# Patient Record
Sex: FEMALE | Race: BLACK OR AFRICAN AMERICAN | Employment: OTHER | RURAL
[De-identification: names, ages, dates, MRNs, and addresses within clinical notes are randomized per-mention and may not be internally consistent; named-entity substitution may affect disease eponyms.]

---

## 2017-02-14 ENCOUNTER — OFFICE VISIT (OUTPATIENT)
Dept: ONCOLOGY | Age: 67
End: 2017-02-14

## 2017-02-14 VITALS
BODY MASS INDEX: 31.57 KG/M2 | TEMPERATURE: 98 F | DIASTOLIC BLOOD PRESSURE: 81 MMHG | HEART RATE: 68 BPM | HEIGHT: 61 IN | WEIGHT: 167.2 LBS | RESPIRATION RATE: 16 BRPM | SYSTOLIC BLOOD PRESSURE: 139 MMHG

## 2017-02-14 DIAGNOSIS — Z79.811 USE OF EXEMESTANE (AROMASIN): ICD-10-CM

## 2017-02-14 DIAGNOSIS — C50.912 MALIGNANT NEOPLASM OF LEFT FEMALE BREAST, UNSPECIFIED SITE OF BREAST: Primary | ICD-10-CM

## 2017-02-14 NOTE — PROGRESS NOTES
Sheba Paez here for follow up for left breast cancer. States she is fine. Ms. Miryam Grossman has a reminder for a \"due or due soon\" health maintenance. I have asked that she contact her primary care provider for follow-up on this health maintenance.

## 2017-02-15 NOTE — PROGRESS NOTES
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     History of Present Illness: In September 2010, the patient underwent a left modified radical mastectomy and sentinel lymph node biopsy with a right prophylactic mastectomy. Tumor proved to be an approximate 5.0 cm poorly differentiated infiltrating ductal carcinoma. Five sentinel lymph nodes were negative for metastasis. The estrogen and progesterone receptors were positive and HER2/susan was negative. Oncotype Dx testing revealed a recurrence score of 32 with an average rate of distal recurrence of 21% which placed the patient into the high risk category. The patient completed 4 cycles of Taxotere and Cytoxan chemotherapy in June 2011 and radiotherapy to the left chest wall given between December 2010 through January 2011. She was begun on Arimidex since July 2011 and because of a bone density study showing osteoporosis, was placed on Fosamax which the patient could not tolerate. The Arimidex was discontinued on July 2012 and tamoxifen 20 mg daily was begun at that time and the patient has tolerated it well.     Of note, is that the patient underwent a hemicolectomy in 1998 for carcinoma of the colon. Lymph nodes were negative. She was started on chemotherapy but this was stopped secondary to side effects and has had no recurrence.     The patient states that she has undergone a D&C and hysterotomy. No OP note is available. Pathology report shows an endocervical polyp and proliferative endometrium.     I discontinued tamoxifen in January 2015. At that time I began Aromasin 25 mg daily and the patient continues on that and is tolerating it well. She does have some minimal muscle aching. She has also been taking Prolia for the osteoporosis and has tolerated that well.     The patient underwent a colonoscopy for rectal bleeding on 12/11/15.  The only thing that was found was internal hemorrhoids. She also has complained of a rash on the left side of her face and neck. The Aromasin was begun in February of 2015. She was also put on Prolia at that time. The rash appeared in October of 2015. She still complains about it, but feels it has changed color to a darker form. It is mildly pruritic. The patient also states that Dr. Valentino Goodrich who did the endoscopy wanted to do some type of ultrasound on the upper abdomen, but this has not been scheduled yet. A CT of the abdomen and pelvis done 9/2/15 when the patient was admitted with a small bowel obstruction showed only the SBO, nonspecific gastric distention. No acute findings were noted.     At the last visit the patient had a rash on the left side of her neck and the Aromasin was held. She is now back on the Aromasin.     She has been taking Aromasin since 2/5/15. She is also on Prolia. She is due for Prolia 60 mg a day and then in six months. Her next visit here is on 6/15/17.     Past Medical History: Medical, endometriosis, essential hypertension, osteoporosis, elevated lipids, hematuria, incontinence, excision of neurofibromas.     Past Surgical History: Bilateral mastectomies, left oophorectomy, colon resection, left foot surgery, hysteroscopy and dental surgery.     Medications: Lipitor, Norvasc, vitamins, lansoprazide, Zantac, Fosamax, and Aromasin.     Allergies: None.     Transfusions: Several in the past.     Personal/Social History: Patient is retired. 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 She has 3 children. Used to smoke cigarettes. Does not use alcohol.     Family Medical History: Father had history of colon cancer. Mother had essential hypertension. Brother had a stroke and a CVA.     Review of Systems: Weight has been stable, no fever or night sweats, denies headache, seizures, eye or ear changes, chronic sputum production, hemoptysis, chest pain, PND, angina.  No nausea, vomiting, diarrhea, melena, hematochezia, constipation, change in bowel habits, hematemesis, hematuria, nocturia, dysuria, urinary frequency, urinary hesitance, musculoskeletal aching, temperature preferences, polyuria, polydipsia or easy bruisability. No bleeding, no allergies, no psychiatric issues, no gynecological problems.     Physical Examination:   GENERAL: Patient is a well developed, well nourished black female in no acute distress. VITALS: Weight 167 pounds, temperature 98 degrees, /81, pulse 68 and regular, respiratory rate 16 and regular. HEENT: Head was normocephalic, atraumatic. Pupils equal, round, reactive to light and accommodation. Extraocular muscles were intact. Fundi were not visualized. Conjunctivae normal. Sclerae were nonicteric. Ears, nose and throat were normal. Tongue is papillated. NECK: Supple, thyroid normal, no bruits heard over the carotid arteries. No jugular venous distention demonstrated. Trachea was midline. NODES: All node bearing areas are negative. No cervical adenopathy and no axillary adenopathy noted. BACK: No tenderness over the thoracic or cervical spine. CHEST: Clear. I hear no rales, rhonchi, or wheezes today. HEART: Regular rhythm. No murmurs, rubs, thrills, or gallops. BREASTS: Bilateral mastectomy scars are present. No evidence of nodularity or recurrent disease. ABDOMEN: Soft. No hepatosplenomegaly, masses or tenderness. Bowel sounds present. SKIN: Warm and dry. No petechiae, no purpura. EXTREMITIES: Show no edema. PELVIC: Deferred. RECTAL: Deferred. NEURO: Intact. Cranial nerves are intact. Plantars are downgoing. Motor and sensory function normal.      Laboratory Data: Check CBC, CMP, CA 27-29.     Impression:  1. Stage IIB poorly differentiated infiltrating ductal carcinoma of the left breast.  2. Status post left modified radical mastectomy and right simple mastectomy done prophylactically.   3. Status post treatment with 4 cycles of Taxotere and Cytoxan chemotherapy and chest wall radiation. 4. Status post hemicolectomy for colon carcinoma. 5. Status post left oophorectomy. 6. Treatment with Arimidex July 2011 to July 2012. Tamoxifen begun July 2012 and stopped by me on 01/21/2015. 7. Aromasin began 02/05/2015 aiming for 10 years per new ASCO guidelines from 6/5/16     Plan:  1. Continue Aromasin 25 mg daily, start date is 2/16, aiming for 10 years. 2. Prolia will be given today. 3. Four month return. 4. She had a CT scan of the chest done on 12/12/16, which showed stable nodularity.   A follow up exam in one year was suggested, which will be on December, 2017.  5. Next bone density will be in August of 2017.     CC:     Sunny Patel MD

## 2017-02-21 ENCOUNTER — TELEPHONE (OUTPATIENT)
Dept: ONCOLOGY | Age: 67
End: 2017-02-21

## 2017-02-21 RX ORDER — EXEMESTANE 25 MG/1
TABLET ORAL
Qty: 30 TAB | Refills: 6 | Status: SHIPPED | OUTPATIENT
Start: 2017-02-21 | End: 2018-01-15 | Stop reason: SDUPTHER

## 2017-02-28 ENCOUNTER — TELEPHONE (OUTPATIENT)
Dept: ONCOLOGY | Age: 67
End: 2017-02-28

## 2017-02-28 NOTE — TELEPHONE ENCOUNTER
WENT TO DRUG STORE TO  AROMASIN RX AND IT WAS $96.  COULD NOT AFFORD IT.  (SHE IS HOPING FOR OTHER OPTIONS)

## 2017-02-28 NOTE — TELEPHONE ENCOUNTER
Talked to patient and told her I do not know how much any medication would be. She states she will call her insurance company and see if it will be this much every month or if she has to reach a deductable. She will call back to let me know what she finds out.

## 2017-06-12 ENCOUNTER — OFFICE VISIT (OUTPATIENT)
Dept: CARDIOLOGY CLINIC | Age: 67
End: 2017-06-12

## 2017-06-12 VITALS
BODY MASS INDEX: 31.64 KG/M2 | DIASTOLIC BLOOD PRESSURE: 80 MMHG | SYSTOLIC BLOOD PRESSURE: 140 MMHG | HEART RATE: 72 BPM | OXYGEN SATURATION: 98 % | WEIGHT: 167.6 LBS | RESPIRATION RATE: 16 BRPM | HEIGHT: 61 IN

## 2017-06-12 DIAGNOSIS — Z90.13 S/P BILATERAL MASTECTOMY: ICD-10-CM

## 2017-06-12 DIAGNOSIS — C50.912 MALIGNANT NEOPLASM OF LEFT FEMALE BREAST, UNSPECIFIED SITE OF BREAST: ICD-10-CM

## 2017-06-12 DIAGNOSIS — E78.5 DYSLIPIDEMIA (HIGH LDL; LOW HDL): ICD-10-CM

## 2017-06-12 DIAGNOSIS — Z87.898 HX OF SYNCOPE: ICD-10-CM

## 2017-06-12 DIAGNOSIS — Z90.49 S/P LEFT HEMICOLECTOMY: ICD-10-CM

## 2017-06-12 DIAGNOSIS — I10 ESSENTIAL HYPERTENSION, BENIGN: Primary | ICD-10-CM

## 2017-06-12 NOTE — PROGRESS NOTES
Verified patient with two patient identifiers. Medications reviewed/approved by Dr. Jose Dominguez. Verbal from Dr. Jose Dominguez to remove the medications that were deleted during the visit.

## 2017-06-12 NOTE — MR AVS SNAPSHOT
Visit Information Date & Time Provider Department Dept. Phone Encounter #  
 6/12/2017  9:40 AM Lee Piña, [address] [place] [address] Cardiology [facility] [phone] [id number] Follow-up Instructions Return in about 6 months (around 12/12/2017). Follow-up and Disposition History Your Appointments 6/15/2017 11:00 AM  
Follow Up with Davin Lo MD  
[facility] [address] [place] [address]) Appt Note: FU  
 [address] [place] [phone]  
  
   
 [address] [place] [address] Upcoming Health Maintenance Date Due Hepatitis C Screening 1950 DTaP/Tdap/Td series (1 - Tdap) 11/8/1969 FOBT Q 1 YEAR AGE 50-75 11/8/1998 ZOSTER VACCINE AGE 60> 11/8/2008 BREAST CANCER SCRN MAMMOGRAM 9/24/2012 GLAUCOMA SCREENING Q2Y 11/8/2013 OSTEOPOROSIS SCREENING (DEXA) 11/8/2013 Pneumococcal 65+ High/Highest Risk (1 of 2 - PCV13) 11/8/2013 MEDICARE YEARLY EXAM 11/8/2013 INFLUENZA AGE 9 TO ADULT 8/1/2017 Allergies as of 6/12/2017  Review Complete On: 6/12/2017 By: Lee Piña MD  
 No Known Allergies Current Immunizations  Reviewed on 2/14/2017 Name Date Influenza Vaccine 10/1/2016 Not reviewed this visit You Were Diagnosed With   
  
 Codes Comments Essential hypertension, benign    -  Primary ICD-10-CM: I10 
ICD-9-CM: 401.1 Hx of syncope     ICD-10-CM: Z87.898 ICD-9-CM: V15.89 Dyslipidemia (high LDL; low HDL)     ICD-10-CM: E78.4 ICD-9-CM: 272.4 Malignant neoplasm of left female breast, unspecified site of breast ([facility] [address]     ICD-10-CM: O45.761 ICD-9-CM: 174.9 S/P bilateral mastectomy     ICD-10-CM: Z90.13 ICD-9-CM: V45.71 S/P left hemicolectomy     ICD-10-CM: Z90.49 ICD-9-CM: V45.89 colon ca Vitals BP Pulse Resp Height(growth percentile) Weight(growth percentile) SpO2 140/80 (BP 1 Location: Right arm, BP Patient Position: Sitting) 72 16 5' 1\" (1.549 m) 167 lb 9.6 oz (76 kg) 98% BMI OB Status Smoking Status 31.67 kg/m2 Postmenopausal Former Smoker Vitals History BMI and BSA Data Body Mass Index Body Surface Area  
 31.67 kg/m 2 1.81 m 2 Preferred Pharmacy Pharmacy Name Phone [phone] [phone] Your Updated Medication List  
  
   
This list is accurate as of: 6/12/17 10:02 AM.  Always use your most recent med list. amLODIPine 10 mg tablet Commonly known as:  Sherald Burkitt Take 10 mg by mouth daily. Indications: HYPERTENSION  
  
 aspirin 325 mg tablet Commonly known as:  ASPIRIN Take 325 mg by mouth daily. CALTRATE 600 + D PO Take  by mouth. CENTRUM SILVER Tab tablet Generic drug:  multivitamins-minerals-lutein Take  by mouth.  
  
 exemestane 25 mg tablet Commonly known as:  AROMASIN  
take 1 tablet by mouth once daily LIPITOR 40 mg tablet Generic drug:  atorvastatin Take 40 mg by mouth daily. magnesium oxide 400 mg tablet Commonly known as:  MAG-OX Take 400 mg by mouth daily. PROLIA 60 mg/mL injection Generic drug:  denosumab 60 mg by SubCUTAneous route. Every 6 months   Indications: POST-MENOPAUSAL OSTEOPOROSIS We Performed the Following AMB POC EKG ROUTINE W/ 12 LEADS, INTER & REP [33577 CPT(R)] Follow-up Instructions Return in about 6 months (around 12/12/2017). To-Do List   
 06/15/2017 10:45 AM  
  Appointment with [facility] [place]  2 at [facility] ([phone])  
  
 08/14/2017 1:30 PM  
  Appointment with [address] [place] [address] 2 at [facility] ([phone]) Introducing [facility]! Luisana Alva introduces [employer] patient portal. Now you can access parts of your medical record, email your doctor's office, and request medication refills online. 1. In your internet browser, go to [email or web address] 2. Click on the First Time User? Click Here link in the Sign In box. You will see the New Member Sign Up page. 3. Enter your Opara Access Code exactly as it appears below. You will not need to use this code after youve completed the sign-up process. If you do not sign up before the expiration date, you must request a new code. · Opara Access Code: [email or web address] Expires: 9/10/2017  9:02 AM 
 
4. Enter the last four digits of your Social Security Number (xxxx) and Date of Birth (mm/dd/yyyy) as indicated and click Submit. You will be taken to the next sign-up page. 5. Create a [employer]t ID. This will be your Opara login ID and cannot be changed, so think of one that is secure and easy to remember. 6. Create a Opara password. You can change your password at any time. 7. Enter your Password Reset Question and Answer. This can be used at a later time if you forget your password. 8. Enter your e-mail address. You will receive e-mail notification when new information is available in [address] 9. Click Sign Up. You can now view and download portions of your medical record. 10. Click the Download Summary menu link to download a portable copy of your medical information. If you have questions, please visit the Frequently Asked Questions section of the Opara website. Remember, Opara is NOT to be used for urgent needs. For medical emergencies, dial 911. Now available from your iPhone and Android! Please provide this summary of care documentation to your next provider. Your [facility]. If you have any questions after today's visit, please call [phone].

## 2017-06-12 NOTE — PROGRESS NOTES
Mayuri Haines is a 74 y.o. female is here for routine f/u. Hx hypertension, dyslipidemia, colon ca (s/p hemicolectomy), breast ca--s/p bilat mastectomies, XRT/Chemo; No known cardiac hx. Episode of syncope after spell of abdominal pain, N/V, diarrhea, cramping and got up from commode--passed out. Initial ED w/u neg--told likely vasovagal. One episode 30-40 yrs ago. Seen by Dr. Gonzalez Pancoast testing: ECHO 10/6/16--normal LV size/walls, LVEF 65-70, mild MR, RSVP 42. Carotid dopplers 16/2/15--3-05% DIONY, 13-13% LICA, bilat antegrade vertebrals. Holter monitor 10/16 with NSR, PAC's, one 3 beat PAT run. No sx since that time. The patient denies chest pain/ shortness of breath, orthopnea, PND, LE edema, palpitations, syncope, presyncope or fatigue.        Patient Active Problem List    Diagnosis Date Noted    Vasovagal syncope 10/11/2016    Left kidney mass 06/08/2016    Rash of neck 01/12/2016    Use of exemestane (Aromasin) 09/15/2015    S/P left hemicolectomy 11/11/2010    S/P bilateral mastectomy 10/18/2010    Essential hypertension, benign 10/12/2010    Dyslipidemia (high LDL; low HDL) 10/12/2010    S/P left hemicolectomy 10/12/2010    Family history of coronary artery disease 10/12/2010    Malignant neoplasm of female breast [facility]) 09/02/2010      Jennifer Khan MD  Past Medical History:   Diagnosis Date    Arthritis     Breast CA ([facility] [address]     left    Cancer ([facility] [address] 1998    colon cancer    Dyslipidemia (high LDL; low HDL) 10/12/2010    Essential hypertension, benign 10/12/2010    Family history of coronary artery disease 10/12/2010    FH: bowel obstruction 9/1/2015    Hypercholesteremia     Hypertension     Ill-defined condition     elevated cholesterol    Postmenopausal     S/P chemotherapy, time since greater than 12 weeks     S/P left hemicolectomy 10/12/2010      Past Surgical History:   Procedure Laterality Date    BREAST SURGERY PROCEDURE UNLISTED  2010    double mastectomy    HX APPENDECTOMY      HX COLECTOMY      HX COLONOSCOPY      HX DILATION AND CURETTAGE  1977    HX GYN  2000    uterine ablation    HX OOPHORECTOMY      part of left ovary removed    HX OTHER SURGICAL  2011    port a cath    HX SALPINGO-OOPHORECTOMY       No Known Allergies   Family History   Problem Relation Age of Onset    Cancer Father 79     colon    Hypertension Mother    [facility] Arthritis-rheumatoid Mother     Elevated Lipids Mother     No Known Problems Sister     Elevated Lipids Brother     Hypertension Brother     Hypertension Sister     Elevated Lipids Sister     No Known Problems Brother     Heart Disease Brother       Social History     Social History    Marital status:      Spouse name: N/A    Number of children: N/A    Years of education: N/A     Occupational History    Not on file. Social History Main Topics    Smoking status: Former Smoker     Packs/day: 0.50     Years: 10.00     Quit date: 1980    Smokeless tobacco: Never Used      Comment: quit 29 years ago    Alcohol use No    Drug use: No    Sexual activity: Not on file      Comment: menses age 13, menopause 44 age, first birth 32,      Other Topics Concern    Not on file     Social History Narrative      Current Outpatient Prescriptions   Medication Sig    CALCIUM CARBONATE/VITAMIN D3 (CALTRATE 600 + D PO) Take  by mouth.  exemestane (AROMASIN) 25 mg tablet take 1 tablet by mouth once daily    aspirin (ASPIRIN) 325 mg tablet Take 325 mg by mouth daily.  Denosumab (PROLIA) 60 mg/mL injection 60 mg by SubCUTAneous route. Every 6 months   Indications: POST-MENOPAUSAL OSTEOPOROSIS    magnesium oxide (MAG-OX) 400 mg tablet Take 400 mg by mouth daily.  atorvastatin (LIPITOR) 40 mg tablet Take 40 mg by mouth daily.  amlodipine (NORVASC) 10 mg tablet Take 10 mg by mouth daily. Indications: HYPERTENSION    multivitamins-minerals-lutein (CENTRUM SILVER) Tab Take  by mouth.      No current facility-administered medications for this visit. Review of Symptoms:    CONST  No weight change. No fever, chills, sweats    ENT No visual changes, URI sx, sore throat    CV  See HPI   RESP  No cough, or sputum, wheezing. Also see HPI   GI  No abdominal pain or change in bowel habits. No heartburn or dysphagia. No melena or rectal bleeding.   No dysuria, urgency, frequency, hematuria   MSKEL  No joint pain, swelling. No muscle pain. SKIN  No rash or lesions. NEURO  No headache, syncope, or seizure. No weakness, loss of sensation, or paresthesias. PSYCH  No low mood or depression  No anxiety. HE/LYMPH  No easy bruising, abnormal bleeding, or enlarged glands.         Physical ExamPhysical Exam:    Visit Vitals    Resp 16    Ht 5' 1\" (1.549 m)    Wt 167 lb 9.6 oz (76 kg)    BMI 31.67 kg/m2     Gen: NAD  HEENT:  PERRL, throat clear  Neck: no mass or thyromegaly, no JVD   Heart:  Regular,Nl S1S2,  no murmur, gallop or rub.   Lungs:  clear  Abdomen:   Soft, non-tender, bowel sounds are active.   Extremities:  No edema  Pulse: symmetric  Neuro: A&O times 3, WNL      Cardiographics    ECG: NSR, wnl    Labs:   Lab Results   Component Value Date/Time    Sodium 139 10/19/2010 04:00 AM    Sodium 142 10/15/2010 02:00 PM    Sodium 142 08/19/2010 03:40 PM    Potassium 3.6 10/19/2010 04:00 AM    Potassium 3.5 10/15/2010 02:00 PM    Potassium 3.8 08/19/2010 03:40 PM    Chloride 104 10/19/2010 04:00 AM    Chloride 105 10/15/2010 02:00 PM    Chloride 103 08/19/2010 03:40 PM    CO2 26 10/19/2010 04:00 AM    CO2 30 10/15/2010 02:00 PM    CO2 28 08/19/2010 03:40 PM    Anion gap 9 10/19/2010 04:00 AM    Anion gap 7 10/15/2010 02:00 PM    Anion gap 11 08/19/2010 03:40 PM    Glucose 136 10/19/2010 04:00 AM    Glucose 131 10/15/2010 02:00 PM    Glucose 88 08/19/2010 03:40 PM    BUN 9 10/19/2010 04:00 AM    BUN 19 10/15/2010 02:00 PM    BUN 17 08/19/2010 03:40 PM    Creatinine 0.9 10/19/2010 04:00 AM Creatinine 1.0 10/15/2010 02:00 PM    Creatinine 0.9 08/19/2010 03:40 PM    BUN/Creatinine ratio 10 10/19/2010 04:00 AM    BUN/Creatinine ratio 19 10/15/2010 02:00 PM    BUN/Creatinine ratio 19 08/19/2010 03:40 PM    GFR est AA >60 10/19/2010 04:00 AM    GFR est AA >60 10/15/2010 02:00 PM    GFR est AA >60 08/19/2010 03:40 PM    GFR est non-AA >60 10/19/2010 04:00 AM    GFR est non-AA 60 10/15/2010 02:00 PM    GFR est non-AA >60 08/19/2010 03:40 PM    Calcium 7.8 10/19/2010 04:00 AM    Calcium 9.0 10/15/2010 02:00 PM    Calcium 9.3 08/19/2010 03:40 PM    Bilirubin, total 0.4 10/15/2010 02:00 PM    AST (SGOT) 18 10/15/2010 02:00 PM    Alk. phosphatase 142 10/15/2010 02:00 PM    Protein, total 7.5 10/15/2010 02:00 PM    Albumin 4.0 10/15/2010 02:00 PM    Globulin 3.5 10/15/2010 02:00 PM    A-G Ratio 1.1 10/15/2010 02:00 PM    ALT (SGPT) 33 10/15/2010 02:00 PM     No results found for: CPK, CPKX, CPX  No results found for: CHOL, CHOLX, CHLST, CHOLV, 473505, HDL, LDL, DLDL, LDLC, DLDLP, TGLX, TRIGL, TRIGP, CHHD, CHHDX  No results found for this or any previous visit. Assessment:         Patient Active Problem List    Diagnosis Date Noted    Vasovagal syncope 10/11/2016    Left kidney mass 06/08/2016    Rash of neck 01/12/2016    Use of exemestane (Aromasin) 09/15/2015    S/P left hemicolectomy 11/11/2010    S/P bilateral mastectomy 10/18/2010    Essential hypertension, benign 10/12/2010    Dyslipidemia (high LDL; low HDL) 10/12/2010    S/P left hemicolectomy 10/12/2010    Family history of coronary artery disease 10/12/2010    Malignant neoplasm of female breast ([address] 09/02/2010     Hx hypertension, dyslipidemia, colon ca (s/p hemicolectomy), breast ca--s/p bilat mastectomies, XRT/Chemo; No known cardiac hx. Episode of syncope after spell of abdominal pain, N/V, diarrhea, cramping and got up from commode--passed out. Initial ED w/u neg--told likely vasovagal. One episode 30-40 yrs ago.  Seen by  Daffeh--out-pt testing: ECHO 10/6/16--normal LV size/walls, LVEF 65-70, mild MR, RSVP 42. Carotid dopplers 12/9/56--3-39% DIONY, 03-52% LICA, bilat antegrade vertebrals. Holter monitor 10/16 with NSR, PAC's, one 3 beat PAT run. No sx since that time. Plan:     Doing well with no adverse cardiac symptoms--no recurrence of syncope. Lipids and labs followed by PCP, f/u as planned. .  Continue current care and f/u in 12 months, sooner prn.     Eugenia Good MD

## 2017-06-14 DIAGNOSIS — C50.919 MALIGNANT NEOPLASM OF FEMALE BREAST, UNSPECIFIED LATERALITY, UNSPECIFIED SITE OF BREAST: Primary | ICD-10-CM

## 2017-06-15 ENCOUNTER — OFFICE VISIT (OUTPATIENT)
Dept: ONCOLOGY | Age: 67
End: 2017-06-15

## 2017-06-15 VITALS
SYSTOLIC BLOOD PRESSURE: 136 MMHG | BODY MASS INDEX: 31.64 KG/M2 | OXYGEN SATURATION: 96 % | WEIGHT: 167.6 LBS | HEART RATE: 73 BPM | HEIGHT: 61 IN | DIASTOLIC BLOOD PRESSURE: 66 MMHG | RESPIRATION RATE: 17 BRPM | TEMPERATURE: 97.5 F

## 2017-06-15 DIAGNOSIS — Z79.811 USE OF EXEMESTANE (AROMASIN): ICD-10-CM

## 2017-06-15 DIAGNOSIS — C50.912 MALIGNANT NEOPLASM OF LEFT FEMALE BREAST, UNSPECIFIED SITE OF BREAST: Primary | ICD-10-CM

## 2017-06-15 DIAGNOSIS — M85.80 OSTEOPENIA DUE TO CANCER THERAPY: ICD-10-CM

## 2017-06-15 DIAGNOSIS — Z78.0 POSTMENOPAUSAL: ICD-10-CM

## 2017-06-15 NOTE — PROGRESS NOTES
Chief Complaint   Patient presents with    Breast Cancer     f/u     1. Have you been to the ER, urgent care clinic since your last visit? Hospitalized since your last visit? No    2. Have you seen or consulted any other health care providers outside of the [address] [place] [address] since your last visit? Include any pap smears or colon screening.  yes  -Dr. Lalitha Williamson (PCP)  -Dr. Franki Pierre (cardio)

## 2017-06-15 NOTE — MR AVS SNAPSHOT
Visit Information Date & Time Provider Department Dept. Phone Encounter #  
 6/15/2017 11:00 AM Jose Raul Martinez MD [facility] [phone] Follow-up Instructions Return in about 4 months (around 10/19/2017) for office visit and evaluation. Your Appointments 12/21/2017 10:40 AM  
ESTABLISHED PATIENT with Milly Trujillo MD  
[address] [place] [facility] [place] [address]) Appt Note: 6 month f/u  
 [address] [facility] [phone] [phone]  
  
   
 [address] [place] [address] [address] [address] Upcoming Health Maintenance Date Due Hepatitis C Screening 1950 DTaP/Tdap/Td series (1 - Tdap) 11/8/1969 FOBT Q 1 YEAR AGE 50-75 11/8/1998 ZOSTER VACCINE AGE 60> 11/8/2008 BREAST CANCER SCRN MAMMOGRAM 9/24/2012 GLAUCOMA SCREENING Q2Y 11/8/2013 OSTEOPOROSIS SCREENING (DEXA) 11/8/2013 Pneumococcal 65+ High/Highest Risk (1 of 2 - PCV13) 11/8/2013 MEDICARE YEARLY EXAM 11/8/2013 INFLUENZA AGE 9 TO ADULT 8/1/2017 Allergies as of 6/15/2017  Review Complete On: 6/15/2017 By: Jose Raul Martinez MD  
 No Known Allergies Current Immunizations  Reviewed on 2/14/2017 Name Date Influenza Vaccine 10/1/2016 Not reviewed this visit You Were Diagnosed With   
  
 Codes Comments Malignant neoplasm of left female breast, unspecified site of breast ([facility] [address]    -  Primary ICD-10-CM: N15.975 ICD-9-CM: 174.9 Use of exemestane (Aromasin)     ICD-10-CM: L42.415 ICD-9-CM: V07.52 Osteopenia due to cancer therapy     ICD-10-CM: M85.80 ICD-9-CM: 733.90 Vitals BP Pulse Temp Resp Height(growth percentile) Weight(growth percentile) 136/66 73 97.5 °F (36.4 °C) (Oral) 17 5' 1\" (1.549 m) 167 lb 9.6 oz (76 kg) SpO2 BMI OB Status Smoking Status 96% 31.67 kg/m2 Postmenopausal Former Smoker Vitals History BMI and BSA Data Body Mass Index Body Surface Area  
 31.67 kg/m 2 1.81 m 2 Preferred Pharmacy Pharmacy Name Phone [facility] [phone] [phone] Your Updated Medication List  
  
   
This list is accurate as of: 6/15/17 11:44 AM.  Always use your most recent med list. amLODIPine 10 mg tablet Commonly known as:  Olga Patel Take 10 mg by mouth daily. Indications: HYPERTENSION  
  
 aspirin 325 mg tablet Commonly known as:  ASPIRIN Take 325 mg by mouth daily. CALTRATE 600 + D PO Take  by mouth. CENTRUM SILVER Tab tablet Generic drug:  multivitamins-minerals-lutein Take  by mouth.  
  
 exemestane 25 mg tablet Commonly known as:  AROMASIN  
take 1 tablet by mouth once daily LIPITOR 40 mg tablet Generic drug:  atorvastatin Take 40 mg by mouth daily. magnesium oxide 400 mg tablet Commonly known as:  MAG-OX Take 400 mg by mouth daily. PROLIA 60 mg/mL injection Generic drug:  denosumab 60 mg by SubCUTAneous route. Every 6 months   Indications: POST-MENOPAUSAL OSTEOPOROSIS Follow-up Instructions Return in about 4 months (around 10/19/2017) for office visit and evaluation. To-Do List   
 08/14/2017 1:30 PM  
  Appointment with [address] [place] [address] 2 at [facility] ([phone])  
  
 08/21/2017 9:30 AM  
  Appointment with [address] [place] [address] 2 at [facility] ([phone]) 09/06/2017 Imaging:  DEXA BONE DENSITY STUDY AXIAL Introducing [facility]! [facility] introduces [employer] patient portal. Now you can access parts of your medical record, email your doctor's office, and request medication refills online. 1. In your internet browser, go to [email or web address] 2. Click on the First Time User? Click Here link in the Sign In box. You will see the New Member Sign Up page. 3. Enter your [employer] Access Code exactly as it appears below. You will not need to use this code after youve completed the sign-up process. If you do not sign up before the expiration date, you must request a new code. · [employer] Access Code: [email or web address] Expires: 9/10/2017  9:02 AM 
 
4. Enter the last four digits of your Social Security Number (xxxx) and Date of Birth (mm/dd/yyyy) as indicated and click Submit. You will be taken to the next sign-up page. 5. Create a [employer] ID. This will be your [employer] login ID and cannot be changed, so think of one that is secure and easy to remember. 6. Create a [employer] password. You can change your password at any time. 7. Enter your Password Reset Question and Answer. This can be used at a later time if you forget your password. 8. Enter your e-mail address. You will receive e-mail notification when new information is available in [address] 9. Click Sign Up. You can now view and download portions of your medical record. 10. Click the Download Summary menu link to download a portable copy of your medical information. If you have questions, please visit the Frequently Asked Questions section of the [employer] website. Remember, [employer] is NOT to be used for urgent needs. For medical emergencies, dial 911. Now available from your iPhone and Android! Please provide this summary of care documentation to your next provider. Your [facility]. If you have any questions after today's visit, please call [phone].

## 2017-06-19 NOTE — PROGRESS NOTES
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 She was last seen in this office on 02/14/17.      History of Present Illness: In September 2010, the patient underwent a left modified radical mastectomy and sentinel lymph node biopsy with a right prophylactic mastectomy. Tumor proved to be an approximate 5.0 cm poorly differentiated infiltrating ductal carcinoma. Five sentinel lymph nodes were negative for metastasis. The estrogen and progesterone receptors were positive and HER2/susan was negative. Oncotype Dx testing revealed a recurrence score of 32 with an average rate of distal recurrence of 21% which placed the patient into the high risk category. The patient completed 4 cycles of Taxotere and Cytoxan chemotherapy in June 2011 and radiotherapy to the left chest wall given between December 2010 through January 2011. She was begun on Arimidex since July 2011 and because of a bone density study showing osteoporosis, was placed on Fosamax which the patient could not tolerate. The Arimidex was discontinued on July 2012 and tamoxifen 20 mg daily was begun at that time and the patient has tolerated it well.      Of note, is that the patient underwent a hemicolectomy in 1998 for carcinoma of the colon. Lymph nodes were negative. She was started on chemotherapy but this was stopped secondary to side effects and has had no recurrence.      The patient states that she has undergone a D&C and hysterotomy. No OP note is available. Pathology report shows an endocervical polyp and proliferative endometrium.      I discontinued tamoxifen in January 2015. At that time I began Aromasin 25 mg daily and the patient continues on that and is tolerating it well. She does have some minimal muscle aching.  She has also been taking Prolia for the osteoporosis and has tolerated that well.      The patient underwent a colonoscopy for rectal bleeding on 12/11/15. The only thing that was found was internal hemorrhoids. She also has complained of a rash on the left side of her face and neck. The Aromasin was begun in February of 2015. She was also put on Prolia at that time. The rash appeared in October of 2015. She still complains about it, but feels it has changed color to a darker form. It is mildly pruritic. The patient also states that Dr. Pako Smith who did the endoscopy wanted to do some type of ultrasound on the upper abdomen, but this has not been scheduled yet. A CT of the abdomen and pelvis done 9/2/15 when the patient was admitted with a small bowel obstruction showed only the SBO, nonspecific gastric distention. No acute findings were noted.      At the last visit the patient had a rash on the left side of her neck and the Aromasin was held. She is now back on the Aromasin.      She has been taking Aromasin since 2/5/15. She is also on Prolia. A bone density study has been scheduled for this July. CT scan of the chest performed on 12/12/16 showed stable nodular densities. The one nodule has been stable from 12/03/14 and the other small nodular density in the right lower lobe was not imaged on the exam of 12/03/14. A follow up exam in one year was recommended.        Past Medical History: Medical, endometriosis, essential hypertension, osteoporosis, elevated lipids, hematuria, incontinence, excision of neurofibromas.      Past Surgical History: Bilateral mastectomies, left oophorectomy, colon resection, left foot surgery, hysteroscopy and dental surgery.      Medications: Lipitor, Norvasc, vitamins, lansoprazide, Zantac, Fosamax, and Aromasin.      Allergies: None.      Transfusions: Several in the past.      Personal/Social History: Patient is retired. 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 She has 3 children. Used to smoke cigarettes.  Does not use alcohol.      Family Medical History: Father had history of colon cancer. Mother had essential hypertension. Brother had a stroke and a CVA.     Review of Systems: Weight has been stable, no fever or night sweats, denies headache, seizures, eye or ear changes, chronic sputum production, hemoptysis, chest pain, PND, angina. No nausea, vomiting, diarrhea, melena, hematochezia, constipation, change in bowel habits, hematemesis, hematuria, nocturia, dysuria, urinary frequency, urinary hesitance, musculoskeletal aching, temperature preferences, polyuria, polydipsia or easy bruisability. No bleeding, no allergies, no psychiatric issues, no gynecological problems.      Physical Examination:   GENERAL: Patient is a well developed, well nourished black female in no acute distress. VITALS: /60, P 73 and regular, T 97.5 degrees,  lb, R 17 and regular. HEENT: Head was normocephalic, atraumatic. Pupils equal, round, reactive to light and accommodation. Extraocular muscles were intact. Fundi were not visualized. Conjunctivae normal. Sclerae were nonicteric. Ears, nose and throat were normal. Tongue is papillated. NECK: Supple, thyroid normal, no bruits heard over the carotid arteries. No jugular venous distention demonstrated. Trachea was midline. NODES: No cervical, supraclavicular, axillary or inguinal adenopathy present. BACK: No tenderness over the LS spine area. CHEST: Clear. I hear no rales, rhonchi, or wheezes. BS adequate at the bases. BREASTS: Bilateral mastectomy scars are present. No evidence of nodularity, erythema or recurrent disease on either side. ABDOMEN: Soft. No liver edge, spleen tip, masses or ascites. Bowel sounds normal.  SKIN: Warm and dry. No petechiae, no purpura. EXTREMITIES: Show no edema. PELVIC: Deferred. RECTAL: Deferred. NEURO: Intact. Motor and sensory function normal and equal.      Laboratory Data: See above.      Impression:  1.  Stage IIB poorly differentiated infiltrating ductal carcinoma of the left breast.  2. Status post left modified radical mastectomy and right simple mastectomy done prophylactically. 3. Status post treatment with 4 cycles of Taxotere and Cytoxan chemotherapy and chest wall radiation. 4. Status post hemicolectomy for colon carcinoma. 5. Status post left oophorectomy. 6. Treatment with Arimidex July 2011 to July 2012. Tamoxifen begun July 2012 and stopped by me on 01/21/2015. 7. Aromasin began 02/05/2015 aiming for 10 years per new ASCO guidelines from 6/5/16      Plan:  1. Continue Aromasin 25 mg daily, start date is 2/16, aiming for 10 years. 2. Continue Prolia q.6 months. 3. Four month return. 4. CT scan of the chest in December of 2017.  5. Bone density study will be scheduled in July.      Addendum:  CA 27-29 is 33.7. CBC - WBC 4.4, HGB 13.8, HCT 42.6, plat 224k, 46 segs, 37 lymphs and 13 monos. CMP normal except for a BS of 122. The creatinine is 0.8.       CC:     Sarthak Irving MD

## 2017-10-19 ENCOUNTER — OFFICE VISIT (OUTPATIENT)
Dept: ONCOLOGY | Age: 67
End: 2017-10-19

## 2017-10-19 VITALS
DIASTOLIC BLOOD PRESSURE: 70 MMHG | SYSTOLIC BLOOD PRESSURE: 150 MMHG | RESPIRATION RATE: 16 BRPM | WEIGHT: 168.2 LBS | HEART RATE: 64 BPM | BODY MASS INDEX: 33.91 KG/M2 | TEMPERATURE: 98.1 F | OXYGEN SATURATION: 98 % | HEIGHT: 59 IN

## 2017-10-19 DIAGNOSIS — R91.8 MULTIPLE LUNG NODULES: ICD-10-CM

## 2017-10-19 DIAGNOSIS — N28.89 LEFT KIDNEY MASS: ICD-10-CM

## 2017-10-19 DIAGNOSIS — M79.89 PAIN AND SWELLING OF TOE OF RIGHT FOOT: ICD-10-CM

## 2017-10-19 DIAGNOSIS — M85.80 OSTEOPENIA DUE TO CANCER THERAPY: ICD-10-CM

## 2017-10-19 DIAGNOSIS — C50.912 MALIGNANT NEOPLASM OF LEFT BREAST IN FEMALE, ESTROGEN RECEPTOR POSITIVE, UNSPECIFIED SITE OF BREAST (HCC): Primary | ICD-10-CM

## 2017-10-19 DIAGNOSIS — R22.31 LUMP OF RIGHT WRIST: ICD-10-CM

## 2017-10-19 DIAGNOSIS — Z17.0 MALIGNANT NEOPLASM OF LEFT BREAST IN FEMALE, ESTROGEN RECEPTOR POSITIVE, UNSPECIFIED SITE OF BREAST (HCC): Primary | ICD-10-CM

## 2017-10-19 DIAGNOSIS — C18.9 MALIGNANT NEOPLASM OF COLON, UNSPECIFIED PART OF COLON (HCC): ICD-10-CM

## 2017-10-19 DIAGNOSIS — M79.674 PAIN AND SWELLING OF TOE OF RIGHT FOOT: ICD-10-CM

## 2017-10-19 NOTE — PROGRESS NOTES
Silvestre Siemens is a 74 y.o. female c/o soreness on top of R foot with occasonial shooting pain in foot. On R wrist she had had a \"cyst\" on inside which she states is sore and increasing in sensitivity and possibly getting larger. Aromasin 25mg daily. Prolia Q6M, last injection was 8/21/17.

## 2017-10-19 NOTE — PROGRESS NOTES
Camilla Toledo is a 74 y.o. female who presents to the office today for the following:  Chief Complaint   Patient presents with    Breast Cancer   Patient is here for f/u of her breast cancer, her left renal mass and her lung nodules. Patient also had colon cancer but that was 20 some years a go in 1998. Kayy Serrano Referring Provider:  Avis Hernandez MD      HPI:  Patient is 74year old  retired , ex smoker for 30+ years, who feels good, except for 2 symptoms. Pain right foot with swelling for about 3-4 weeks and a lump right wrist which is also painful. This has scared the patient . No injury direct or indirect to either of these 2 areas. Patient also worries as to what is going on in her lung, her kidney, is it \"sleeping cancer'/ etc. Patient is on Aromasin and get s rare hot flash lasting a few minutes, nothing to wirte home about. Patient is , travels to [facility]. Not been to [place]. No family history of Sarcoidosis. Has no farm animals, had a dog, no more. No night sweats, fevers, weight loss, appetite loss, no GI/ symptoms. No CNS complaints. No rash. In September 2010, the patient underwent a left modified radical mastectomy and sentinel lymph node biopsy with a right prophylactic mastectomy. Tumor proved to be an approximate 5.0 cm poorly differentiated infiltrating ductal carcinoma. Five sentinel lymph nodes were negative for metastasis. The estrogen and progesterone receptors were positive and HER2/susan was negative. Oncotype Dx testing revealed a recurrence score of 32 with an average rate of distal recurrence of 21% which placed the patient into the high risk category. The patient completed 4 cycles of Taxotere and Cytoxan chemotherapy in June 2011 and radiotherapy to the left chest wall given between December 2010 through January 2011.  She was begun on Arimidex since July 2011 and because of a bone density study showing osteoporosis, was placed on Fosamax which the patient could not tolerate. The Arimidex was discontinued on July 2012 and tamoxifen 20 mg daily was begun at that time and the patient has tolerated it well.      Of note, is that the patient underwent a hemicolectomy in 1998 for carcinoma of the colon. Lymph nodes were negative. She was started on chemotherapy but this was stopped secondary to side effects and has had no recurrence.      The patient states that she has undergone a D&C and hysterotomy. No OP note is available. Pathology report shows an endocervical polyp and proliferative endometrium.      Discontinued tamoxifen in January 2015. At that time  began Aromasin 25 mg daily and the patient continues on that and is tolerating it well. She does have some minimal muscle aching. She has also been taking Prolia for the osteoporosis and has tolerated that well.      The patient underwent a colonoscopy for rectal bleeding on 12/11/15. The only thing that was found was internal hemorrhoids. She also has complained of a rash on the left side of her face and neck. The Aromasin was begun in February of 2015. She was also put on Prolia at that time. The rash appeared in October of 2015. She still complains about it, but feels it has changed color to a darker form. It is mildly pruritic. The patient also states that Dr. Elli López who did the endoscopy wanted to do some type of ultrasound on the upper abdomen, but this has not been scheduled yet. A CT of the abdomen and pelvis done 9/2/15 when the patient was admitted with a small bowel obstruction showed only the SBO, nonspecific gastric distention. No acute findings were noted.      At the last visit the patient had a rash on the left side of her neck and the Aromasin was held. She is now back on the Aromasin.      She has been taking Aromasin since 2/5/15. She is also on Prolia. A bone density study has been scheduled for this July.   CT scan of the chest performed on 12/12/16 showed stable nodular densities. The one nodule has been stable from 12/03/14 and the other small nodular density in the right lower lobe was not imaged on the exam of 12/03/14. A follow up exam in one year was recommended. Underlying illnesses include Osteopenia, obesity, excision of a neurofibroma, hyperlipidemia,  Endometriosis, left oophrectomy at young age,  Colon resection, Left foot surgery, dental surgery.          CURRENT TREATMENT:  Surveillance and Aromasin 25 mg po daily, begun Feb 2015. Arimidex from July 2011 to July 2012. Tamoxifen from Aug 2012 to  Jan 2015. And Aromasin begun Feb 2015 to the present. Mammograms not necessary, bilateral mastectomy done. Bone density every 2 years. Next due July 2019. Prolia 60mg SC every 6 months.   Past Medical History:   Diagnosis Date    Arthritis     Breast CA ([address]     left    Cancer ([address] 1998    colon cancer    Dyslipidemia (high LDL; low HDL) 10/12/2010    Essential hypertension, benign 10/12/2010    Family history of coronary artery disease 10/12/2010    FH: bowel obstruction 9/1/2015    Hypercholesteremia     Hypertension     Ill-defined condition     elevated cholesterol    Postmenopausal     S/P chemotherapy, time since greater than 12 weeks     S/P left hemicolectomy 10/12/2010     Past Surgical History:   Procedure Laterality Date    BREAST SURGERY PROCEDURE UNLISTED  2010    double mastectomy    HX APPENDECTOMY      HX COLECTOMY  1998    HX COLONOSCOPY      HX DILATION AND CURETTAGE  1977    HX GYN  2000    uterine ablation    HX OOPHORECTOMY  1977    part of left ovary removed    HX OTHER SURGICAL  2011    port a cath    HX SALPINGO-OOPHORECTOMY           No flowsheet data found.]    Family History   Problem Relation Age of Onset    Cancer Father 79     colon    Hypertension Mother     Arthritis-rheumatoid Mother     Elevated Lipids Mother     No Known Problems Sister     Elevated Lipids Brother     Hypertension Brother    [facility] Hypertension Sister     Elevated Lipids Sister     No Known Problems Brother     Heart Disease Brother                 [facility]             exemestane (AROMASIN) 25 mg tablet  (Taking) take 1 tablet by mouth once daily              No Known Allergies    TRANSFUSIONS: None     Social History     Social History    Marital status:      Spouse name: N/A    Number of children: N/A    Years of education: N/A     Social History Main Topics    Smoking status: Former Smoker     Packs/day: 0.50     Years: 10.00     Quit date: 1980    Smokeless tobacco: Never Used      Comment: quit 29 years ago    Alcohol use No    Drug use: No    Sexual activity: Not on file      Comment: menses age 13, menopause 44 age, first birth 32,      Other Topics Concern    Not on file     Social History Narrative   . Family History   Problem Relation Age of Onset    Cancer Father 79     colon    Hypertension Mother    [facility] Arthritis-rheumatoid Mother     Elevated Lipids Mother     No Known Problems Sister     Elevated Lipids Brother     Hypertension Brother     Hypertension Sister     Elevated Lipids Sister     No Known Problems Brother     Heart Disease Brother        Review of Systems:  Head:  Negative   Respiratory:  Negative except for  Cardiac:  Negative except for  GI:  Negative except for  :  Negative except for  Extremities:  Negative except for  Musculoskeletal:  Negative except for  Neurologic:  Negative except for  Skin:  Negative except for    Physical Exam:  Visit Vitals    /70    Pulse 64    Temp 98.1 °F (36.7 °C) (Oral)    Resp 16    Ht 4' 11\" (1.499 m)    Wt 168 lb 3.2 oz (76.3 kg)    SpO2 98%    BMI 33.97 kg/m2     PS: 1/4 ECOG. Patient is AAO x 3, sitting and appears to be her chronological age and in no acute distress. EENT:  Conjunctiva pink, Corneas clear, Sclera do not appear icteric, Pupils CCERL. Has cataracts.   Oral cavity, buccal mucosa is normal, tongue is midline. Neck is devoid of adenopathy and there is no JVD or prominent thyroid nodule. Lungs:  Clear to auscultation. Chest Wall: bilateral mastectomy, no SC nodules. Breast: NA.  Cardiovascular:  Reveals a NSR, no m/g/r. Abdomen:  No g/r/t. No organomegaly. No ascites clinically. ? Fatty liver felt. Extremities:  No tenderness of the calf, or over the femoral vein and Rasta's sign is negative. No clubbing or cyanosis of the fingernails noted. No edema. Right wrist volar aspect has a nodule raised and lobular, almost feels gritty, is not all cystic like a ganglion. It is tendeer, not hot or red. The right foot has swelling fluctuant over the right side dorsum. Bunyons noted bilaterally Pedal pulses are decreased bilaterally, but the feet are not cold or ulcerated. Skin:  Turgor is good. Neurological:  Cranial nerves are intact. Speech and gait are normal.    Impression:  1. Malignant neoplasm of left breast in female, ER+ve, WI+ve, HER2 -ve. 2. Osteopenia on Aromasin and hence on Prolia. 3. Left kidney mass, ? Angiomyolipoma. PET/CT may shed some light. Patient has had hematuria on and off, which is worrisome. 4. Malignant neoplasm of colon, 1998, since it has been almost 20 years, patient is most likely cured from that lesion. Father had colon cancer. Patient must f/u for colonoscopies. 5. Multiple lung nodules, will get a PET/CT and if FDG avid, will set up biopsy.           Results for orders placed or performed during the hospital encounter of 08/21/17   RENAL FUNCTION PANEL   Result Value Ref Range    Sodium 139 136 - 145 mmol/L    Potassium 4.2 3.5 - 5.1 mmol/L    Chloride 104 97 - 108 mmol/L    CO2 26 21 - 32 mmol/L    Anion gap 9 5 - 15 mmol/L    Glucose 142 (H) 65 - 100 mg/dL    BUN 17 7.0 - 18.0 MG/DL    Creatinine 0.76 0.55 - 1.02 MG/DL    BUN/Creatinine ratio 22 (H) 12 - 20      GFR est AA >60 >60 ml/min/1.73m2    GFR est non-AA >60 >60 ml/min/1.73m2    Calcium 9.0 8.5 - 10.1 MG/DL    Phosphorus 3.8 2.6 - 4.7 MG/DL    Albumin 4.1 3.5 - 5.0 g/dL   PHOSPHORUS   Result Value Ref Range    Phosphorus 3.9 2.6 - 4.7 MG/DL   MAGNESIUM   Result Value Ref Range    Magnesium 2.2 1.6 - 2.4 mg/dL     No orders of the defined types were placed in this encounter. Plan (Comment):  XRay of the right foot. And right wrist  Today. Await (p) labs. CMP and CA27-29 and Urine analysis. Continue Prolia q6months. Get a PET/CT and bring patient back in 3-4 weeks to go over the results.       Follow-up Disposition: Not on File    Ferny Lima MD

## 2017-10-19 NOTE — MR AVS SNAPSHOT
Visit Information Date & Time Provider Department Dept. Phone Encounter #  
 10/19/2017  9:00 AM Anca Cast MD [facility] [id number] Follow-up Instructions Return in about 4 weeks (around 11/16/2017) for MD office visit. Your Appointments 12/21/2017 10:40 AM  
ESTABLISHED PATIENT with Milo Farrar MD  
[address] [facility] [place] - [facility]) Appt Note: 6 month f/u  
 [address] [facility] 67 [address] [phone]  
  
   
 [address] [place] [address] Upcoming Health Maintenance Date Due Hepatitis C Screening 1950 DTaP/Tdap/Td series (1 - Tdap) 11/8/1969 FOBT Q 1 YEAR AGE 50-75 11/8/1998 ZOSTER VACCINE AGE 60> 9/8/2008 BREAST CANCER SCRN MAMMOGRAM 9/24/2012 GLAUCOMA SCREENING Q2Y 11/8/2013 Pneumococcal 65+ High/Highest Risk (1 of 2 - PCV13) 11/8/2013 MEDICARE YEARLY EXAM 11/8/2013 INFLUENZA AGE 9 TO ADULT 8/1/2017 Allergies as of 10/19/2017  Review Complete On: 10/19/2017 By: Anca Cast MD  
 No Known Allergies Current Immunizations  Reviewed on 10/19/2017 Name Date Influenza Vaccine 10/1/2016 Reviewed by Bhargav Hernández RN on 10/19/2017 at  9:25 AM  
You Were Diagnosed With   
  
 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   
 Lump of right wrist     ICD-10-CM: R22.31 
ICD-9-CM: 699. 2 Vitals BP Pulse Temp Resp Height(growth percentile) Weight(growth percentile) 150/70 64 98.1 °F (36.7 °C) (Oral) 16 4' 11\" (1.499 m) 168 lb 3.2 oz (76.3 kg) SpO2 BMI OB Status Smoking Status 98% 33.97 kg/m2 Postmenopausal Former Smoker BMI and BSA Data Body Mass Index Body Surface Area  
 33.97 kg/m 2 1.78 m 2 Preferred Pharm[facility] Pharm[facility] Name Phone [phone] [facility] [phone] Your Updated Medication List  
  
   
This list is accurate as of: 10/19/17 10:08 AM.  Always use your most recent med list. amLODIPine 10 mg tablet Commonly known as:  [place] Take 10 mg by mouth daily. Indications: HYPERTENSION  
  
 aspirin 325 mg tablet Commonly known as:  ASPIRIN Take 325 mg by mouth daily. CALTRATE 600 + D PO Take  by mouth. CENTRUM SILVER Tab tablet Generic drug:  multivitamins-minerals-lutein Take  by mouth.  
  
 exemestane 25 mg tablet Commonly known as:  AROMASIN  
take 1 tablet by mouth once daily LIPITOR 40 mg tablet Generic drug:  atorvastatin Take 40 mg by mouth daily. magnesium oxide 400 mg tablet Commonly known as:  MAG-OX Take 400 mg by mouth daily. PROLIA 60 mg/mL injection Generic drug:  denosumab 60 mg by SubCUTAneous route. Every 6 months   Indications: POST-MENOPAUSAL OSTEOPOROSIS Follow-up Instructions Return in about 4 weeks (around 11/16/2017) for MD office visit. To-Do List   
 10/19/2017 Imaging:  XR FOOT RT MIN 3 V   
  
 10/19/2017 Imaging:  XR WRIST RT AP/LAT   
  
 11/02/2017 Imaging:  PET/CT TUMOR IMAGE 1 [facility]  (SUB)   
  
 02/22/2018 9:30 AM  
  Appointment with Yusef Hadley 2 at [facility] ([phone]) Introducing [facility]!    
 Lizzette Tang introduces NuPathe patient portal. Now you can access parts of your medical record, email your doctor's office, and request medication refills online. 1. In your internet browser, go to [email or web address] 2. Click on the First Time User? Click Here link in the Sign In box. You will see the New Member Sign Up page. 3. Enter your Oh BiBi Access Code exactly as it appears below. You will not need to use this code after youve completed the sign-up process. If you do not sign up before the expiration date, you must request a new code. · Oh BiBi Access Code: [email or web address] Expires: 1/17/2018 10:08 AM 
 
4. Enter the last four digits of your Social Security Number (xxxx) and Date of Birth (mm/dd/yyyy) as indicated and click Submit. You will be taken to the next sign-up page. 5. Create a Oh BiBi ID. This will be your Oh BiBi login ID and cannot be changed, so think of one that is secure and easy to remember. 6. Create a Oh BiBi password. You can change your password at any time. 7. Enter your Password Reset Question and Answer. This can be used at a later time if you forget your password. 8. Enter your e-mail address. You will receive e-mail notification when new information is available in [address] 9. Click Sign Up. You can now view and download portions of your medical record. 10. Click the Download Summary menu link to download a portable copy of your medical information. If you have questions, please visit the Frequently Asked Questions section of the Oh BiBi website. Remember, Oh BiBi is NOT to be used for urgent needs. For medical emergencies, dial 911. Now available from your iPhone and Android! Please provide this summary of care documentation to your next provider. Your primary care clinician is listed as June B Daffeh. If you have any questions after today's visit, please call [phone].

## 2017-11-02 ENCOUNTER — HOSPITAL ENCOUNTER (OUTPATIENT)
Dept: PET IMAGING | Age: 67
Discharge: HOME | End: 2017-11-02
Attending: INTERNAL MEDICINE
Payer: MEDICARE

## 2017-11-02 VITALS — WEIGHT: 168 LBS | BODY MASS INDEX: 32.98 KG/M2 | HEIGHT: 60 IN

## 2017-11-02 DIAGNOSIS — N28.89 LEFT KIDNEY MASS: ICD-10-CM

## 2017-11-02 DIAGNOSIS — C50.912 MALIGNANT NEOPLASM OF LEFT BREAST IN FEMALE, ESTROGEN RECEPTOR POSITIVE, UNSPECIFIED SITE OF BREAST (HCC): ICD-10-CM

## 2017-11-02 DIAGNOSIS — R91.8 MULTIPLE LUNG NODULES: ICD-10-CM

## 2017-11-02 DIAGNOSIS — M79.89 PAIN AND SWELLING OF TOE OF RIGHT FOOT: ICD-10-CM

## 2017-11-02 DIAGNOSIS — M79.674 PAIN AND SWELLING OF TOE OF RIGHT FOOT: ICD-10-CM

## 2017-11-02 DIAGNOSIS — Z17.0 MALIGNANT NEOPLASM OF LEFT BREAST IN FEMALE, ESTROGEN RECEPTOR POSITIVE, UNSPECIFIED SITE OF BREAST (HCC): ICD-10-CM

## 2017-11-02 DIAGNOSIS — R22.31 LUMP OF RIGHT WRIST: ICD-10-CM

## 2017-11-02 DIAGNOSIS — C18.9 MALIGNANT NEOPLASM OF COLON, UNSPECIFIED PART OF COLON (HCC): ICD-10-CM

## 2017-11-02 PROCEDURE — A9552 F18 FDG: HCPCS

## 2017-11-02 RX ORDER — SODIUM CHLORIDE 0.9 % (FLUSH) 0.9 %
10 SYRINGE (ML) INJECTION
Status: COMPLETED | OUTPATIENT
Start: 2017-11-02 | End: 2017-11-02

## 2017-11-02 RX ADMIN — Medication 10 ML: at 13:45

## 2017-11-06 ENCOUNTER — OFFICE VISIT (OUTPATIENT)
Dept: ONCOLOGY | Age: 67
End: 2017-11-06

## 2017-11-06 VITALS
HEIGHT: 60 IN | RESPIRATION RATE: 18 BRPM | BODY MASS INDEX: 32.98 KG/M2 | HEART RATE: 70 BPM | DIASTOLIC BLOOD PRESSURE: 79 MMHG | OXYGEN SATURATION: 98 % | WEIGHT: 168 LBS | SYSTOLIC BLOOD PRESSURE: 168 MMHG | TEMPERATURE: 96.8 F

## 2017-11-06 DIAGNOSIS — Z90.49 S/P LEFT HEMICOLECTOMY: ICD-10-CM

## 2017-11-06 DIAGNOSIS — Z90.13 S/P BILATERAL MASTECTOMY: ICD-10-CM

## 2017-11-06 DIAGNOSIS — N28.89 LEFT KIDNEY MASS: ICD-10-CM

## 2017-11-06 DIAGNOSIS — C50.912 MALIGNANT NEOPLASM OF LEFT BREAST IN FEMALE, ESTROGEN RECEPTOR POSITIVE, UNSPECIFIED SITE OF BREAST (HCC): Primary | ICD-10-CM

## 2017-11-06 DIAGNOSIS — Z17.0 MALIGNANT NEOPLASM OF LEFT BREAST IN FEMALE, ESTROGEN RECEPTOR POSITIVE, UNSPECIFIED SITE OF BREAST (HCC): Primary | ICD-10-CM

## 2017-11-06 NOTE — MR AVS SNAPSHOT
Visit Information Date & Time Provider Department Dept. Phone Encounter #  
 11/6/2017 10:30 AM Vel Omalley MD [facility] [phone] [id number] Follow-up Instructions Return in about 6 months (around 5/6/2018) for MD office visit, MD office visit, labs day before. Routing History Your Appointments 12/21/2017 10:40 AM  
ESTABLISHED PATIENT with Peter Andres MD  
[address] [facility] [place] [facility] [place] [facility]) Appt Note: 6 month f/u  
 [address] [facility] 67 [phone]  
  
   
 [address] [address] Upcoming Health Maintenance Date Due Hepatitis C Screening 1950 DTaP/Tdap/Td series (1 - Tdap) 11/8/1969 FOBT Q 1 YEAR AGE 50-75 11/8/1998 ZOSTER VACCINE AGE 60> 9/8/2008 BREAST CANCER SCRN MAMMOGRAM 9/24/2012 GLAUCOMA SCREENING Q2Y 11/8/2013 Pneumococcal 65+ High/Highest Risk (1 of 2 - PCV13) 11/8/2013 MEDICARE YEARLY EXAM 11/8/2013 INFLUENZA AGE 9 TO ADULT 8/1/2017 Allergies as of 11/6/2017  Review Complete On: 11/6/2017 By: Vel Omalley MD  
 No Known Allergies Current Immunizations  Reviewed on 11/6/2017 Name Date Influenza Vaccine 10/1/2016 Reviewed by Patricia Rod RN on 11/6/2017 at 10:39 AM  
Vitals BP Pulse Temp Resp Height(growth percentile) Weight(growth percentile) 168/79 70 96.8 °F (36 °C) (Oral) 18 5' (1.524 m) 168 lb (76.2 kg) SpO2 BMI OB Status Smoking Status 98% 32.81 kg/m2 Postmenopausal Former Smoker Vitals History BMI and BSA Data Body Mass Index Body Surface Area  
 32.81 kg/m 2 1.8 m 2 [phone]  [phone] Your Updated Medication List  
  
   
This list is accurate as of: 11/6/17 11:47 AM.  Always use your most recent med list. amLODIPine 10 mg tablet Commonly known as:  [place] Take 10 mg by mouth daily. Indications: HYPERTENSION  
  
 aspirin 325 mg tablet Commonly known as:  ASPIRIN Take 325 mg by mouth daily. CALTRATE 600 + D PO Take  by mouth. CENTRUM SILVER Tab tablet Generic drug:  multivitamins-minerals-lutein Take  by mouth.  
  
 exemestane 25 mg tablet Commonly known as:  AROMASIN  
take 1 tablet by mouth once daily LIPITOR 40 mg tablet Generic drug:  atorvastatin Take 40 mg by mouth daily. magnesium oxide 400 mg tablet Commonly known as:  MAG-OX Take 400 mg by mouth daily. PROLIA 60 mg/mL injection Generic drug:  denosumab 60 mg by SubCUTAneous route. Every 6 months   Indications: POST-MENOPAUSAL OSTEOPOROSIS Follow-up Instructions Return in about 6 months (around 5/6/2018) for MD office visit, MD office visit, labs day before. To-Do List   
 02/22/2018 9:30 AM  
  Appointment with [address] [place], [place] [address]  2 at [facility] ([phone]) Introducing [facility] & [facility] SERVICES! Linda Hernandez introduces Â[employer] patient portal. Now you can access parts of your medical record, email your doctor's office, and request medication refills online. 1. In your internet browser, go to [email or web address]/Montrue [email or web address] 2. Click on the First Time User? Click Here link in the Sign In box. You will see the New Member Sign Up page. 3. Enter your Âµ-GPS [employer] Access Code exactly as it appears below. You will not need to use this code after youve completed the sign-up process. If you do not sign up before the expiration date, you must request a new code. · Âµ-G[employer] Access Code: [email or web address] Expires: 1/17/2018  9:08 AM 
 
4. Enter the last four digits of your Social Security Number (xxxx) and Date of Birth (mm/dd/yyyy) as indicated and click Submit. You will be taken to the next sign-up page. 5. Create a Jakks Pacific ID. This will be your [employer] login ID and cannot be changed, so think of one that is secure and easy to remember. 6. Create a [employer] password. You can change your password at any time. 7. Enter your Password Reset Question and Answer. This can be used at a later time if you forget your password. 8. Enter your e-mail address. You will receive e-mail notification when new information is available in [address] 9. Click Sign Up. You can now view and download portions of your medical record. 10. Click the Download Summary menu link to download a portable copy of your medical information. If you have questions, please visit the Frequently Asked Questions section of the [employer] website. Remember, [employer] is NOT to be used for urgent needs. For medical emergencies, dial 911. Now available from your iPhone and Android! Please provide this summary of care documentation to your next provider. Your primary care clinician is listed as Suze Wen. If you have any questions after today's visit, please call [phone].

## 2017-11-06 NOTE — PROGRESS NOTES
Belia Conde is a 74 y.o. female denies complaints. Patient taking aromasin 25mg daily.    Prolia injection Q6M, last injection was 8/21/2017

## 2017-11-30 NOTE — PROGRESS NOTES
REASON  FOR VISIT:  Mrs. Pam Moreira is here to go over the results of her Xrays  And PET/CT. Except that patient did not go for the XRays, did not get requisites??, did have the PET though. Patient was seen on Oct. 19th 2017 and had pain right foot and lump and discomfort right wrist and a CT which suggested a mass in the left kidney. Patient is a retired  and was naturally worried, gisela in context of having had 2 malignancies, Breast and Colon. Please see last visit's note for details. HPI: Patient feels fine, except for the same right foot pain and some swelling laterally. Right wrist still has the same almost calcified area, quite irregular. Not much change in either in the past 3 weeks. Appetite is good, not loosing weight. No calf pain. No upper arm pain over the vein. All else is stable. Patient was sent for the XRays today,  from the office directly, that were not done. Interim results:  PET/CT, read as no foci of abnormal hypermetabolism. A 6mm right lower lobe nodule seen with no increase uptake and it was also seen on June 2016 CT, no change. The left kidney had no abnormal FDG uptake. No abnormal skeletal uptake either. XRay of the right wrist showed mild DJD first Aia 16 joint with spurring. Xray of the right foot showed no soft tissue abnormality. Mild osseous changes ist metatarsal head medially, small plantar calcaneal spur. Minor dorsal calcaneal enthesophytes. All in all degenerative changes. Impression:  Mrs. Pam Moreira is stable from her breast cancer on the Aromasin. No metastatic disease noted at present. The pain and swelling symptoms in the wrist and foot are degenerative in nature. Patient to return in 6 months for follow up. And feel free to call PRN if any new issue should arise. From the colon cancer standpoint patient not likely to recur, 20 years out.  But since the patient had right hemicolectomy and often the terminal ileum is removed, one would keep an eye on the B12 levels and iron. Plan:  Continue Prolia and calcium and D supplements, next due Feb 2018. Labs and urine 2-3 days before appointment. Order  Form filled. Call PRN if any blood in the urine noted or pain over the CVA area. Cc: Dr. Pascale Geller.

## 2017-12-21 ENCOUNTER — OFFICE VISIT (OUTPATIENT)
Dept: CARDIOLOGY CLINIC | Age: 67
End: 2017-12-21

## 2017-12-21 VITALS
RESPIRATION RATE: 18 BRPM | SYSTOLIC BLOOD PRESSURE: 130 MMHG | BODY MASS INDEX: 32.98 KG/M2 | HEART RATE: 66 BPM | DIASTOLIC BLOOD PRESSURE: 64 MMHG | HEIGHT: 60 IN | OXYGEN SATURATION: 99 % | WEIGHT: 168 LBS

## 2017-12-21 DIAGNOSIS — Z90.13 S/P BILATERAL MASTECTOMY: ICD-10-CM

## 2017-12-21 DIAGNOSIS — Z90.49 S/P LEFT HEMICOLECTOMY: ICD-10-CM

## 2017-12-21 DIAGNOSIS — I10 ESSENTIAL HYPERTENSION, BENIGN: Primary | ICD-10-CM

## 2017-12-21 DIAGNOSIS — Z87.898 HX OF SYNCOPE: ICD-10-CM

## 2017-12-21 DIAGNOSIS — E78.5 DYSLIPIDEMIA (HIGH LDL; LOW HDL): ICD-10-CM

## 2017-12-21 NOTE — MR AVS SNAPSHOT
Visit Information Date & Time Provider Department Dept. Phone Encounter #  
 12/21/2017 10:40 AM Milo Farrar, [address] [facility] [phone] [id number] Follow-up Instructions Return in about 6 months (around 6/21/2018). Follow-up and Disposition History Your Appointments 7/13/2018 10:20 AM  
ESTABLISHED PATIENT with Milo Farrar MD  
[address] [facility]) Appt Note: 6 mo fu $0cp [address] [facility] 67 [address] [phone]  
  
   
 [address] [place] [address] Avenue [address] Upcoming Health Maintenance Date Due Hepatitis C Screening 1950 DTaP/Tdap/Td series (1 - Tdap) 11/8/1969 FOBT Q 1 YEAR AGE 50-75 11/8/1998 ZOSTER VACCINE AGE 60> 9/8/2008 GLAUCOMA SCREENING Q2Y 11/8/2013 Pneumococcal 65+ High/Highest Risk (1 of 2 - PCV13) 11/8/2013 MEDICARE YEARLY EXAM 11/8/2013 Influenza Age 5 to Adult 8/1/2017 Allergies as of 12/21/2017  Review Complete On: 12/21/2017 By: Milo Farrar MD  
 No Known Allergies Current Immunizations  Reviewed on 11/6/2017 Name Date Influenza Vaccine 10/1/2016 Not reviewed this visit You Were Diagnosed With   
  
 Codes Comments Essential hypertension, benign    -  Primary ICD-10-CM: I10 
ICD-9-CM: 401.1 Hx of syncope     ICD-10-CM: Z87.898 ICD-9-CM: V15.89 Dyslipidemia (high LDL; low HDL)     ICD-10-CM: E78.4 ICD-9-CM: 272.4 S/P bilateral mastectomy     ICD-10-CM: Z90.13 ICD-9-CM: V45.71 S/P left hemicolectomy     ICD-10-CM: Z90.49 ICD-9-CM: V45.89 Vitals BP Pulse Resp Height(growth percentile) Weight(growth percentile) SpO2  
 130/64 (BP 1 Location: Right arm, BP Patient Position: Sitting) 66 18 5' (1.524 m) 168 lb (76.2 kg) 99% BMI OB Status Smoking Status 32.81 kg/m2 Postmenopausal Former Smoker Vitals History BMI and BSA Data Body Mass Index Body Surface Area  
 32.81 kg/m 2 1.8 m 2 Preferred Pharmacy Pharmacy Name Phone [phone] [phone] Your Updated Medication List  
  
   
This list is accurate as of: 12/21/17 11:10 AM.  Always use your most recent med list. amLODIPine 10 mg tablet Commonly known as:  [place] Take 10 mg by mouth daily. Indications: HYPERTENSION  
  
 aspirin 325 mg tablet Commonly known as:  ASPIRIN Take 325 mg by mouth daily. CALTRATE 600 + D PO Take  by mouth. CENTRUM SILVER Tab tablet Generic drug:  multivitamins-minerals-lutein Take  by mouth.  
  
 exemestane 25 mg tablet Commonly known as:  AROMASIN  
take 1 tablet by mouth once daily LIPITOR 40 mg tablet Generic drug:  atorvastatin Take 40 mg by mouth daily. magnesium oxide 400 mg tablet Commonly known as:  MAG-OX Take 400 mg by mouth daily. PROLIA 60 mg/mL injection Generic drug:  denosumab 60 mg by SubCUTAneous route. Every 6 months   Indications: POST-MENOPAUSAL OSTEOPOROSIS Follow-up Instructions Return in about 6 months (around 6/21/2018). To-Do List   
 02/22/2018 9:30 AM  
  Appointment with Yusef Hadley 2 at [facility] ([phone]) Introducing \[facility] [place] [facility]\"" & [facility] SERVICES! Archana Butterfield introduces Unt[employer] patient portal. Now you can access parts of your medical record, email your doctor's office, and request medication refills online. 1. In your internet browser, go to [email or web address] 2. Click on the First Time User? Click Here link in the Sign In box. You will see the New Member Sign Up page. 3. Enter your Untangle Access Code exactly as it appears below. You will not need to use this code after youve completed the sign-up process. If you do not sign up before the expiration date, you must request a new code. · MicroEval Access Code: [email or web address] Expires: 1/17/2018  9:08 AM 
 
4. Enter the last four digits of your Social Security Number (xxxx) and Date of Birth (mm/dd/yyyy) as indicated and click Submit. You will be taken to the next sign-up page. 5. Create a MicroEval ID. This will be your MicroEval login ID and cannot be changed, so think of one that is secure and easy to remember. 6. Create a MicroEval password. You can change your password at any time. 7. Enter your Password Reset Question and Answer. This can be used at a later time if you forget your password. 8. Enter your e-mail address. You will receive e-mail notification when new information is available in [address] 9. Click Sign Up. You can now view and download portions of your medical record. 10. Click the Download Summary menu link to download a portable copy of your medical information. If you have questions, please visit the Frequently Asked Questions section of the MicroEval website. Remember, MicroEval is NOT to be used for urgent needs. For medical emergencies, dial 911. Now available from your iPhone and Android! Please provide this summary of care documentation to your next provider. Your primary care clinician is listed as Suze B Behzad. If you have any questions after today's visit, please call [phone].

## 2017-12-21 NOTE — PROGRESS NOTES
Verified patient with two patient identifiers. Medications reviewed/approved by Dr. Gissell Gilliland. Chief Complaint   Patient presents with    Hypertension     6 MONTH FOLLOW UP     1. Have you been to the ER, urgent care clinic since your last visit? Hospitalized since your last visit? NO.    2. Have you seen or consulted any other health care providers outside of the [address] [place] [address] since your last visit? Include any pap smears or colon screening.  YES - Dr. Chris Hedrick - pcp, oncology group (mds change per pt.)

## 2017-12-21 NOTE — PROGRESS NOTES
Perri Banda is a 69 y.o. female is here for routine f/u. Hx hypertension, dyslipidemia, colon ca (s/p hemicolectomy), breast ca--s/p bilat mastectomies, XRT/Chemo; No known cardiac hx. Episode of syncope after spell of abdominal pain, N/V, diarrhea, cramping and got up from commode--passed out. Initial ED w/u neg--told likely vasovagal. One episode 30-40 yrs ago. Seen by Dr. Gregoria Casarez testing: ECHO 10/6/16--normal LV size/walls, LVEF 65-70, mild MR, RSVP 42. Carotid dopplers 61/6/69--8-53% DIONY, 63-17% LICA, bilat antegrade vertebrals. Holter monitor 10/16 with NSR, PAC's, one 3 beat PAT run. Had some issues with LE edema, improved after reduced Na. The patient denies chest pain/ shortness of breath, orthopnea, PND, , palpitations, syncope, presyncope or fatigue.        Patient Active Problem List    Diagnosis Date Noted    Osteopenia due to cancer therapy 06/15/2017    Vasovagal syncope 10/11/2016    Left kidney mass 06/08/2016    Rash of neck 01/12/2016    Use of exemestane (Aromasin) 09/15/2015    S/P left hemicolectomy 11/11/2010    S/P bilateral mastectomy 10/18/2010    Essential hypertension, benign 10/12/2010    Dyslipidemia (high LDL; low HDL) 10/12/2010    S/P left hemicolectomy 10/12/2010    Family history of coronary artery disease 10/12/2010    Malignant neoplasm of female breast [facility]) 09/02/2010      Nehal Garrido MD  Past Medical History:   Diagnosis Date    Arthritis     Breast CA ([facility] [address]     left    Cancer ([facility] [address] 1998    colon cancer    Dyslipidemia (high LDL; low HDL) 10/12/2010    Essential hypertension, benign 10/12/2010    Family history of coronary artery disease 10/12/2010    FH: bowel obstruction 9/1/2015    Hypercholesteremia     Hypertension     Ill-defined condition     elevated cholesterol    Postmenopausal     S/P chemotherapy, time since greater than 12 weeks     S/P left hemicolectomy 10/12/2010      Past Surgical History:   Procedure Laterality Date    BREAST SURGERY PROCEDURE UNLISTED      double mastectomy    HX APPENDECTOMY      HX COLECTOMY      HX COLONOSCOPY      HX DILATION AND CURETTAGE      HX GYN  2000    uterine ablation    HX OOPHORECTOMY      part of left ovary removed    HX OTHER SURGICAL  2011    port a cath    HX SALPINGO-OOPHORECTOMY       No Known Allergies   Family History   Problem Relation Age of Onset    Cancer Father 79     colon    Hypertension Mother    Eugene Suero Arthritis-rheumatoid Mother     Elevated Lipids Mother     No Known Problems Sister     Elevated Lipids Brother     Hypertension Brother     Hypertension Sister     Elevated Lipids Sister     No Known Problems Brother     Heart Disease Brother       Social History     Social History    Marital status:      Spouse name: N/A    Number of children: N/A    Years of education: N/A     Occupational History    Not on file. Social History Main Topics    Smoking status: Former Smoker     Packs/day: 0.50     Years: 10.00     Quit date: 1980    Smokeless tobacco: Never Used      Comment: quit 29 years ago    Alcohol use No    Drug use: No    Sexual activity: Not on file      Comment: menses age 13, menopause 44 age, first birth 32,      Other Topics Concern    Not on file     Social History Narrative      Current Outpatient Prescriptions   Medication Sig    CALCIUM CARBONATE/VITAMIN D3 (CALTRATE 600 + D PO) Take  by mouth.  exemestane (AROMASIN) 25 mg tablet take 1 tablet by mouth once daily    aspirin (ASPIRIN) 325 mg tablet Take 325 mg by mouth daily.  Denosumab (PROLIA) 60 mg/mL injection 60 mg by SubCUTAneous route. Every 6 months   Indications: POST-MENOPAUSAL OSTEOPOROSIS    magnesium oxide (MAG-OX) 400 mg tablet Take 400 mg by mouth daily.  atorvastatin (LIPITOR) 40 mg tablet Take 40 mg by mouth daily.  amlodipine (NORVASC) 10 mg tablet Take 10 mg by mouth daily.  Indications: HYPERTENSION    multivitamins-minerals-lutein (CENTRUM SILVER) Tab Take  by mouth. No current facility-administered medications for this visit. Review of Symptoms:    CONST  No weight change. No fever, chills, sweats    ENT No visual changes, URI sx, sore throat    CV  See HPI   RESP  No cough, or sputum, wheezing. Also see HPI   GI  No abdominal pain or change in bowel habits. No heartburn or dysphagia. No melena or rectal bleeding.   No dysuria, urgency, frequency, hematuria   MSKEL  No joint pain, swelling. No muscle pain. SKIN  No rash or lesions. NEURO  No headache, syncope, or seizure. No weakness, loss of sensation, or paresthesias. PSYCH  No low mood or depression  No anxiety. HE/LYMPH  No easy bruising, abnormal bleeding, or enlarged glands.         Physical ExamPhysical Exam:    Visit Vitals    /64 (BP 1 Location: Right arm, BP Patient Position: Sitting)    Pulse 66    Resp 18    Ht 5' (1.524 m)    Wt 168 lb (76.2 kg)    SpO2 99%    BMI 32.81 kg/m2     Gen: NAD  HEENT:  PERRL, throat clear  Neck: no adenopathy, no thyromegaly, no JVD   Heart:  Regular,Nl S1S2,  no murmur, gallop or rub.   Lungs:  clear  Abdomen:   Soft, non-tender, bowel sounds are active.   Extremities:  No edema  Pulse: symmetric  Neuro: A&O times 3, No focal neuro deficits    Cardiographics    ECG: normal EKG, normal sinus rhythm, unchanged from previous tracings      Labs:   Lab Results   Component Value Date/Time    Sodium 144 10/19/2017 09:27 AM    Sodium 139 08/21/2017 09:45 AM    Sodium 143 06/15/2017 11:15 AM    Sodium 139 10/19/2010 04:00 AM    Sodium 142 10/15/2010 02:00 PM    Potassium 3.7 10/19/2017 09:27 AM    Potassium 4.2 08/21/2017 09:45 AM    Potassium 3.9 06/15/2017 11:15 AM    Potassium 3.6 10/19/2010 04:00 AM    Potassium 3.5 10/15/2010 02:00 PM    Chloride 104 10/19/2017 09:27 AM    Chloride 104 08/21/2017 09:45 AM    Chloride 104 06/15/2017 11:15 AM    Chloride 104 10/19/2010 04:00 AM Chloride 105 10/15/2010 02:00 PM    CO2 31 10/19/2017 09:27 AM    CO2 26 08/21/2017 09:45 AM    CO2 30 06/15/2017 11:15 AM    CO2 26 10/19/2010 04:00 AM    CO2 30 10/15/2010 02:00 PM    Anion gap 9 10/19/2017 09:27 AM    Anion gap 9 08/21/2017 09:45 AM    Anion gap 9 06/15/2017 11:15 AM    Anion gap 9 10/19/2010 04:00 AM    Anion gap 7 10/15/2010 02:00 PM    Glucose 128 10/19/2017 09:27 AM    Glucose 142 08/21/2017 09:45 AM    Glucose 122 06/15/2017 11:15 AM    Glucose 136 10/19/2010 04:00 AM    Glucose 131 10/15/2010 02:00 PM    BUN 18 10/19/2017 09:27 AM    BUN 17 08/21/2017 09:45 AM    BUN 15 06/15/2017 11:15 AM    BUN 9 10/19/2010 04:00 AM    BUN 19 10/15/2010 02:00 PM    Creatinine 0.85 10/19/2017 09:27 AM    Creatinine 0.76 08/21/2017 09:45 AM    Creatinine 0.90 06/15/2017 11:15 AM    Creatinine 0.9 10/19/2010 04:00 AM    Creatinine 1.0 10/15/2010 02:00 PM    BUN/Creatinine ratio 21 10/19/2017 09:27 AM    BUN/Creatinine ratio 22 08/21/2017 09:45 AM    BUN/Creatinine ratio 17 06/15/2017 11:15 AM    BUN/Creatinine ratio 10 10/19/2010 04:00 AM    BUN/Creatinine ratio 19 10/15/2010 02:00 PM    GFR est AA >60 10/19/2017 09:27 AM    GFR est AA >60 08/21/2017 09:45 AM    GFR est AA >60 06/15/2017 11:15 AM    GFR est AA >60 10/19/2010 04:00 AM    GFR est AA >60 10/15/2010 02:00 PM    GFR est non-AA >60 10/19/2017 09:27 AM    GFR est non-AA >60 08/21/2017 09:45 AM    GFR est non-AA >60 06/15/2017 11:15 AM    GFR est non-AA >60 10/19/2010 04:00 AM    GFR est non-AA 60 10/15/2010 02:00 PM    Calcium 9.4 10/19/2017 09:27 AM    Calcium 9.0 08/21/2017 09:45 AM    Calcium 9.1 06/15/2017 11:15 AM    Calcium 7.8 10/19/2010 04:00 AM    Calcium 9.0 10/15/2010 02:00 PM    Bilirubin, total 0.5 10/19/2017 09:27 AM    Bilirubin, total 0.5 06/15/2017 11:15 AM    Bilirubin, total 0.4 10/15/2010 02:00 PM    AST (SGOT) 18 10/19/2017 09:27 AM    AST (SGOT) 19 06/15/2017 11:15 AM    AST (SGOT) 18 10/15/2010 02:00 PM    Alk.  phosphatase 80 10/19/2017 09:27 AM    Alk. phosphatase 77 06/15/2017 11:15 AM    Alk. phosphatase 142 10/15/2010 02:00 PM    Protein, total 8.5 10/19/2017 09:27 AM    Protein, total 8.1 06/15/2017 11:15 AM    Protein, total 7.5 10/15/2010 02:00 PM    Albumin 4.6 10/19/2017 09:27 AM    Albumin 4.1 08/21/2017 09:45 AM    Albumin 4.3 06/15/2017 11:15 AM    Albumin 4.0 10/15/2010 02:00 PM    Globulin 3.9 10/19/2017 09:27 AM    Globulin 3.8 06/15/2017 11:15 AM    Globulin 3.5 10/15/2010 02:00 PM    A-G Ratio 1.2 10/19/2017 09:27 AM    A-G Ratio 1.1 06/15/2017 11:15 AM    A-G Ratio 1.1 10/15/2010 02:00 PM    ALT (SGPT) 35 10/19/2017 09:27 AM    ALT (SGPT) 30 06/15/2017 11:15 AM    ALT (SGPT) 33 10/15/2010 02:00 PM     No results found for: CPK, CPKX, CPX  No results found for: CHOL, CHOLX, CHLST, CHOLV, 650559, HDL, LDL, LDLC, DLDLP, TGLX, TRIGL, TRIGP, CHHD, CHHDX  No results found for this or any previous visit. Assessment:         Patient Active Problem List    Diagnosis Date Noted    Osteopenia due to cancer therapy 06/15/2017    Vasovagal syncope 10/11/2016    Left kidney mass 06/08/2016    Rash of neck 01/12/2016    Use of exemestane (Aromasin) 09/15/2015    S/P left hemicolectomy 11/11/2010    S/P bilateral mastectomy 10/18/2010    Essential hypertension, benign 10/12/2010    Dyslipidemia (high LDL; low HDL) 10/12/2010    S/P left hemicolectomy 10/12/2010    Family history of coronary artery disease 10/12/2010    Malignant neoplasm of female breast ([facility] [address] 09/02/2010      Hx hypertension, dyslipidemia, colon ca (s/p hemicolectomy), breast ca--s/p bilat mastectomies, XRT/Chemo; No known cardiac hx. Episode of syncope after spell of abdominal pain, N/V, diarrhea, cramping and got up from commode--passed out. Initial ED w/u neg--told likely vasovagal. One episode 30-40 yrs ago. Seen by Dr. Sharlene Alcala testing: ECHO 10/6/16--normal LV size/walls, LVEF 65-70, mild MR, RSVP 42.  Carotid dopplers 10/6/16--1-15% DIONY, 86-13% LICA, bilat antegrade vertebrals. Holter monitor 10/16 with NSR, PAC's, one 3 beat PAT run. No sx since that time. Plan:     Doing well with no adverse cardiac symptoms. Lipids and labs followed by PCP. Continue current care and f/u in 6 months.     Valentín Crump MD

## 2018-01-15 RX ORDER — EXEMESTANE 25 MG/1
TABLET ORAL
Qty: 30 TAB | Refills: 6 | Status: SHIPPED | OUTPATIENT
Start: 2018-01-15 | End: 2018-09-17 | Stop reason: SDUPTHER

## 2018-01-19 ENCOUNTER — TELEPHONE (OUTPATIENT)
Dept: ONCOLOGY | Age: 68
End: 2018-01-19

## 2018-01-19 NOTE — TELEPHONE ENCOUNTER
Received a note on 1/18/18 stating patient wanted a call concerning her RX for Aromasin. Returned call to Mrs Bianca Link, who stated she went to pharmacy to  her Rx for Aromasin but her co-pay would be 112.00 per month and she cannot afford this. She stated she had called last year and spoke with someone who was able to get her co pay down. She was unsure of what they did. I looked in chart but was unable to find any mention of any program to reduce cost.    1-19-18 searched for programs to reduce cost for RX, found  Aromasin savings card [phone] and  [facility] 0 . Relayed this information back to Mrs Bianca Link, who will need to call due to their request of additional information. Mrs Lucas Dee stated she will call.

## 2018-05-30 ENCOUNTER — OFFICE VISIT (OUTPATIENT)
Dept: ONCOLOGY | Age: 68
End: 2018-05-30

## 2018-05-30 VITALS
BODY MASS INDEX: 32.98 KG/M2 | WEIGHT: 168 LBS | SYSTOLIC BLOOD PRESSURE: 135 MMHG | RESPIRATION RATE: 16 BRPM | TEMPERATURE: 97.6 F | OXYGEN SATURATION: 97 % | HEIGHT: 60 IN | HEART RATE: 67 BPM | DIASTOLIC BLOOD PRESSURE: 77 MMHG

## 2018-05-30 DIAGNOSIS — Z17.0 MALIGNANT NEOPLASM OF LEFT BREAST IN FEMALE, ESTROGEN RECEPTOR POSITIVE, UNSPECIFIED SITE OF BREAST (HCC): Primary | ICD-10-CM

## 2018-05-30 DIAGNOSIS — M85.80 OSTEOPENIA DUE TO CANCER THERAPY: ICD-10-CM

## 2018-05-30 DIAGNOSIS — Z90.13 S/P BILATERAL MASTECTOMY: ICD-10-CM

## 2018-05-30 DIAGNOSIS — C50.912 MALIGNANT NEOPLASM OF LEFT BREAST IN FEMALE, ESTROGEN RECEPTOR POSITIVE, UNSPECIFIED SITE OF BREAST (HCC): Primary | ICD-10-CM

## 2018-05-30 NOTE — PROGRESS NOTES
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 Shahla Hernandez  History of Present Illness: In September 2010, the patient underwent a left modified radical mastectomy and sentinel lymph node biopsy with a right prophylactic mastectomy. Tumor proved to be an approximate 5.0 cm poorly differentiated infiltrating ductal carcinoma. Five sentinel lymph nodes were negative for metastasis. The estrogen and progesterone receptors were positive and HER2/susan was negative. Oncotype Dx testing revealed a recurrence score of 32 with an average rate of distal recurrence of 21% which placed the patient into the high risk category. The patient completed 4 cycles of Taxotere and Cytoxan chemotherapy in June 2011 and radiotherapy to the left chest wall given between December 2010 through January 2011. She was begun on Arimidex since July 2011 and because of a bone density study showing osteoporosis, was placed on Fosamax which the patient could not tolerate. The Arimidex was discontinued on July 2012 and tamoxifen 20 mg daily was begun at that time and the patient has tolerated it well.      Of note, is that the patient underwent a hemicolectomy in 1998 for carcinoma of the colon. Lymph nodes were negative. She was started on chemotherapy but this was stopped secondary to side effects and has had no recurrence.      The patient states that she has undergone a D&C and hysterotomy. No OP note is available. Pathology report shows an endocervical polyp and proliferative endometrium.      discontinued tamoxifen in January 2015. At that time Aromasin 25 mg daily started and and the patient continues on that and is tolerating it well. She does have some minimal muscle aching. She has also been taking Prolia for the osteoporosis and has tolerated that well.      The patient underwent a colonoscopy for rectal bleeding on 12/11/15.  The only thing that was found was internal hemorrhoids. She also has complained of a rash on the left side of her face and neck. The Aromasin was begun in February of 2015. She was also put on Prolia at that time.       She has been taking Aromasin since 2/5/15. She is also on Prolia.            Past Medical History: Medical, endometriosis, essential hypertension, osteoporosis, elevated lipids, hematuria, incontinence, excision of neurofibromas.      Past Surgical History: Bilateral mastectomies, left oophorectomy, colon resection, left foot surgery, hysteroscopy and dental surgery.      Medications: Lipitor, Norvasc, vitamins, lansoprazide, Zantac, Fosamax, and Aromasin.      Allergies: None.      Transfusions: Several in the past.      Personal/Social History: Patient is retired. 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 She has 3 children. Used to smoke cigarettes. Does not use alcohol.      Family Medical History: Father had history of colon cancer. Mother had essential hypertension. Brother had a stroke and a CVA.     Review of Systems: Weight has been stable, no fever or night sweats, denies headache, seizures, eye or ear changes, chronic sputum production, hemoptysis, chest pain, PND, angina. No nausea, vomiting, diarrhea, melena, hematochezia, constipation, change in bowel habits, hematemesis, hematuria, nocturia, dysuria, urinary frequency, urinary hesitance, musculoskeletal aching, temperature preferences, polyuria, polydipsia or easy bruisability. No bleeding, no allergies, no psychiatric issues, no gynecological problems.      Physical Examination:   GENERAL: Patient is a well developed, well nourished black female in no acute distress. VITALS:   HEENT: Head was normocephalic, atraumatic. Pupils equal, round, reactive to light and accommodation. Extraocular muscles were intact. Fundi were not visualized. Conjunctivae normal. Sclerae were nonicteric.  Ears, nose and throat were normal. Tongue is papillated. NECK: Supple, thyroid normal, no bruits heard over the carotid arteries. No jugular venous distention demonstrated. Trachea was midline. NODES: No cervical, supraclavicular, axillary or inguinal adenopathy present. BACK: No tenderness over the LS spine area. CHEST: Clear. I hear no rales, rhonchi, or wheezes. BS adequate at the bases. BREASTS: Bilateral mastectomy scars are present. No evidence of nodularity, erythema or recurrent disease on either side. ABDOMEN: Soft. No liver edge, spleen tip, masses or ascites. Bowel sounds normal.  SKIN: Warm and dry. No petechiae, no purpura. EXTREMITIES: Show no edema. PELVIC: Deferred. RECTAL: Deferred. NEURO: Intact. Motor and sensory function normal and equal.      Laboratory Data: See above.    7/2017 bone dexa showed osteopenia. PET/CT FROM 11/2017 NEGATIVE UPTAKE , OLD 6MM RLL NODULE PRESENT,STABLE   Impression:  1. Stage IIB poorly differentiated infiltrating ductal carcinoma of the left breast.ER/AR POSTIVE AND CDA1AWQ NEGATIVE, NOW 8 YEARS OUT, NO EVIDENCE FOR RECURRENCE.   2. Status post left modified radical mastectomy and right simple mastectomy done prophylactically. 3. Status post treatment with 4 cycles of Taxotere and Cytoxan chemotherapy and chest wall radiation. 4. Status post hemicolectomy for colon carcinoma. 5. Status post left oophorectomy. 6. intlerance to  Arimidex  And Tamoxifen stopped  01/21/2015. 7. Aromasin began 02/05/2015 Fox Kali CONTINUE THE AROMASIN FOR 2 MORE YEARS aiming for 10 years TOTAL YEARS OF ADJUVANT THERAPY.     Plan:  1. Continue Aromasin 25 mg daily, FOR 2 more years. Check cmp,ca27-29 .  2. Continue Prolia q.6 months. 3. Four month return. for ov   4.  Repeat bone dexa next year.       Addendum:  .      CC:     Dahlia Aguirre MD

## 2018-05-30 NOTE — PROGRESS NOTES
Gema Kidd is a 69 y.o. female had bilateral mastectomy, has tenderness underarms, occasional breat pain. last Prolia injection was 2/22/18. Takes Aromasin 25 mg daily,       She had her last Dexa in 7/2017 and a PET in 11/2017.

## 2018-05-30 NOTE — MR AVS SNAPSHOT
[address] [id number] [phone] Patient: Sarah García MRN: [id number] CEP:[birth year] Visit Information Date & Time Provider Department Dept. Phone Encounter #  
 5/30/2018 10:30 AM Nataly Salguero MD [facility] [phone] Follow-up Instructions Return in about 5 months (around 11/13/2018) for OV. Your Appointments 7/13/2018 10:20 AM  
ESTABLISHED PATIENT with Barbi Costa MD  
[address] [facility]) Appt Note: 6 mo fu $0cp [address] [id number] [phone]  
  
   
 [address] [place] [address] [address] [address] Upcoming Health Maintenance Date Due Hepatitis C Screening 1950 DTaP/Tdap/Td series (1 - Tdap) 11/8/1969 FOBT Q 1 YEAR AGE 50-75 11/8/1998 ZOSTER VACCINE AGE 60> 9/8/2008 BREAST CANCER SCRN MAMMOGRAM 9/24/2012 GLAUCOMA SCREENING Q2Y 11/8/2013 Pneumococcal 65+ High/Highest Risk (1 of 2 - PCV13) 11/8/2013 MEDICARE YEARLY EXAM 3/14/2018 Influenza Age 5 to Adult 8/1/2018 Allergies as of 5/30/2018  Review Complete On: 5/30/2018 By: Nataly Salguero MD  
 No Known Allergies Current Immunizations  Reviewed on 5/30/2018 Name Date Influenza Vaccine 11/22/2017, 10/1/2016 Reviewed by Josephus Siemens, RN on 5/30/2018 at 10:36 AM  
You Were Diagnosed With   
  
 Codes Comments Malignant neoplasm of left breast in female, estrogen receptor positive, unspecified site of breast ([facility] [address]    -  Primary ICD-10-CM: C50.912, Z17.0 ICD-9-CM: 174.9, V86.0 S/P bilateral mastectomy     ICD-10-CM: Z90.13 ICD-9-CM: V45.71 Osteopenia due to cancer therapy     ICD-10-CM: M85.80 ICD-9-CM: 733.90 Vitals BP Pulse Temp Resp Height(growth percentile) Weight(growth percentile) 135/77 67 97.6 °F (36.4 °C) (Oral) 16 5' (1.524 m) 168 lb (76.2 kg) SpO2 BMI OB Status Smoking Status 97% 32.81 kg/m2 Postmenopausal Former Smoker BMI and BSA Data Body Mass Index Body Surface Area  
 32.81 kg/m 2 1.8 m 2 Preferred Pharmacy Pharmacy Name Phone [facility] [address] [phone] Your Updated Medication List  
  
   
This list is accurate as of 5/30/18 11:11 AM.  Always use your most recent med list. amLODIPine 10 mg tablet Commonly known as:  Remonia Grates Take 10 mg by mouth daily. Indications: HYPERTENSION  
  
 aspirin 325 mg tablet Commonly known as:  ASPIRIN Take 325 mg by mouth daily. CALTRATE 600 + D PO Take  by mouth. CENTRUM SILVER Tab tablet Generic drug:  multivitamins-minerals-lutein Take  by mouth.  
  
 exemestane 25 mg tablet Commonly known as:  AROMASIN  
take 1 tablet by mouth once daily LIPITOR 40 mg tablet Generic drug:  atorvastatin Take 40 mg by mouth daily. magnesium oxide 400 mg tablet Commonly known as:  MAG-OX Take 400 mg by mouth daily. PROLIA 60 mg/mL injection Generic drug:  denosumab 60 mg by SubCUTAneous route. Every 6 months   Indications: POST-MENOPAUSAL OSTEOPOROSIS Follow-up Instructions Return in about 5 months (around 11/13/2018) for OV. To-Do List   
 08/23/2018 9:30 AM  
  Appointment with Yusef Barrera  2 at [facility] ([phone]) Introducing [facility]! [facility] introduces [email or web address] patient portal. Now you can access parts of your medical record, email your doctor's office, and request medication refills online. 1. In your internet browser, go to [email or web address] 2. Click on the First Time User? Click Here link in the Sign In box. You will see the New Member Sign Up page. 3. Enter your [email or web address] Access Code exactly as it appears below.  You will not need to use this code after youve completed the sign-up process. If you do not sign up before the expiration date, you must request a new code. · [employer] Access Code: [email or web address] Expires: 8/20/2018  3:41 PM 
 
4. Enter the last four digits of your Social Security Number (xxxx) and Date of Birth (mm/dd/yyyy) as indicated and click Submit. You will be taken to the next sign-up page. 5. Create a [employer] ID. This will be your [employer] login ID and cannot be changed, so think of one that is secure and easy to remember. 6. Create a [employer] password. You can change your password at any time. 7. Enter your Password Reset Question and Answer. This can be used at a later time if you forget your password. 8. Enter your e-mail address. You will receive e-mail notification when new information is available in [address] 9. Click Sign Up. You can now view and download portions of your medical record. 10. Click the Download Summary menu link to download a portable copy of your medical information. If you have questions, please visit the Frequently Asked Questions section of the [employer] website. Remember, [employer] is NOT to be used for urgent needs. For medical emergencies, dial 911. Now available from your iPhone and Android! Please provide this summary of care documentation to your next provider. [facility]. If you have any questions after today's visit, please call [phone].

## 2018-07-13 ENCOUNTER — OFFICE VISIT (OUTPATIENT)
Dept: CARDIOLOGY CLINIC | Age: 68
End: 2018-07-13

## 2018-07-13 VITALS
BODY MASS INDEX: 32.59 KG/M2 | HEART RATE: 64 BPM | OXYGEN SATURATION: 98 % | SYSTOLIC BLOOD PRESSURE: 140 MMHG | HEIGHT: 60 IN | WEIGHT: 166 LBS | DIASTOLIC BLOOD PRESSURE: 80 MMHG | RESPIRATION RATE: 18 BRPM

## 2018-07-13 DIAGNOSIS — R55 VASOVAGAL SYNCOPE: ICD-10-CM

## 2018-07-13 DIAGNOSIS — C18.9 MALIGNANT NEOPLASM OF COLON, UNSPECIFIED PART OF COLON (HCC): ICD-10-CM

## 2018-07-13 DIAGNOSIS — G45.9 TRANSIENT CEREBRAL ISCHEMIA, UNSPECIFIED TYPE: Primary | ICD-10-CM

## 2018-07-13 DIAGNOSIS — E78.5 DYSLIPIDEMIA (HIGH LDL; LOW HDL): ICD-10-CM

## 2018-07-13 DIAGNOSIS — I10 ESSENTIAL HYPERTENSION, BENIGN: ICD-10-CM

## 2018-07-13 DIAGNOSIS — Z90.13 S/P BILATERAL MASTECTOMY: ICD-10-CM

## 2018-07-13 DIAGNOSIS — Z90.49 S/P LEFT HEMICOLECTOMY: ICD-10-CM

## 2018-07-13 DIAGNOSIS — C50.912 MALIGNANT NEOPLASM OF LEFT BREAST IN FEMALE, ESTROGEN RECEPTOR POSITIVE, UNSPECIFIED SITE OF BREAST (HCC): ICD-10-CM

## 2018-07-13 DIAGNOSIS — Z17.0 MALIGNANT NEOPLASM OF LEFT BREAST IN FEMALE, ESTROGEN RECEPTOR POSITIVE, UNSPECIFIED SITE OF BREAST (HCC): ICD-10-CM

## 2018-07-13 NOTE — PROGRESS NOTES
Verified patient with two patient identifiers. Medications reviewed/approved by Dr. Ariana Camacho. Chief Complaint   Patient presents with    Hypertension     6 month follow up     1. Have you been to the ER, urgent care clinic since your last visit? Hospitalized since your last visit? Yes, rth admission for possible stroke 7/8/18    2. Have you seen or consulted any other health care providers outside of the [facility] since your last visit? Include any pap smears or colon screening. Yes, Dr. Val Elliott - pcp last seen 3 months ago.

## 2018-07-13 NOTE — PROGRESS NOTES
Kaylan Badillo is a 69 y.o. female is here for routine f/u. Hx hypertension, dyslipidemia, colon ca (s/p hemicolectomy), breast ca--s/p bilat mastectomies, XRT/Chemo; No known cardiac hx. Episode of syncope after spell of abdominal pain, N/V, diarrhea, cramping and got up from commode--passed out. Initial ED w/u neg--told likely vasovagal. One episode 30-40 yrs ago. Seen by Dr. Ivy Back testing: ECHO 10/6/16--normal LV size/walls, LVEF 65-70, mild MR, RSVP 42. Carotid dopplers 02/1/42--8-49% DIONY, 02-58% LICA, bilat antegrade vertebrals. Holter monitor 10/16 with NSR, PAC's, one 3 beat PAT run. Had some issues with LE edema, improved after reduced Na. Over past weekend had confusion/weakness--prob TIA, at [facility] with neg CV w/u. Apparently had Echo as part of w/u, but results not currently available. No CV sx or complaints currently. Continues to see PCP and Oncology. The patient denies chest pain/ shortness of breath, orthopnea, PND, LE edema, palpitations, syncope, presyncope or fatigue.        Patient Active Problem List    Diagnosis Date Noted    Osteopenia due to cancer therapy 06/15/2017    Vasovagal syncope 10/11/2016    Left kidney mass 06/08/2016    Rash of neck 01/12/2016    Use of exemestane (Aromasin) 09/15/2015    S/P left hemicolectomy 11/11/2010    S/P bilateral mastectomy 10/18/2010    Essential hypertension, benign 10/12/2010    Dyslipidemia (high LDL; low HDL) 10/12/2010    S/P left hemicolectomy 10/12/2010    Family history of coronary artery disease 10/12/2010    Malignant neoplasm of female breast [facility]) 09/02/2010      Tejas Munoz MD  Past Medical History:   Diagnosis Date    Arthritis     Breast CA ([facility]     left    Cancer ([facility] 1998    colon cancer    Dyslipidemia (high LDL; low HDL) 10/12/2010    Essential hypertension, benign 10/12/2010    Family history of coronary artery disease 10/12/2010    FH: bowel obstruction 9/1/2015    Hypercholesteremia  Hypertension     Ill-defined condition     elevated cholesterol    Postmenopausal     S/P chemotherapy, time since greater than 12 weeks     S/P left hemicolectomy 10/12/2010      Past Surgical History:   Procedure Laterality Date    BREAST SURGERY PROCEDURE UNLISTED      double mastectomy    HX APPENDECTOMY      HX COLECTOMY  1998    HX COLONOSCOPY      HX DILATION AND CURETTAGE  1977    HX GYN  2000    uterine ablation    HX OOPHORECTOMY      part of left ovary removed    HX OTHER SURGICAL  2011    port a cath    HX SALPINGO-OOPHORECTOMY       No Known Allergies   Family History   Problem Relation Age of Onset    Cancer Father 79     colon    Hypertension Mother    [de-identified] Arthritis-rheumatoid Mother     Elevated Lipids Mother     No Known Problems Sister     Elevated Lipids Brother     Hypertension Brother     Hypertension Sister     Elevated Lipids Sister     No Known Problems Brother     Heart Disease Brother       Social History     Social History    Marital status:      Spouse name: N/A    Number of children: N/A    Years of education: N/A     Occupational History    Not on file. Social History Main Topics    Smoking status: Former Smoker     Packs/day: 0.50     Years: 10.00     Quit date: 1980    Smokeless tobacco: Never Used      Comment: quit 29 years ago    Alcohol use No    Drug use: No    Sexual activity: Not on file      Comment: menses age 13, menopause 44 age, first birth 32,      Other Topics Concern    Not on file     Social History Narrative      Current Outpatient Prescriptions   Medication Sig    exemestane (AROMASIN) 25 mg tablet take 1 tablet by mouth once daily    CALCIUM CARBONATE/VITAMIN D3 (CALTRATE 600 + D PO) Take  by mouth.  aspirin (ASPIRIN) 325 mg tablet Take 325 mg by mouth daily.  Denosumab (PROLIA) 60 mg/mL injection 60 mg by SubCUTAneous route.  Every 6 months   Indications: POST-MENOPAUSAL OSTEOPOROSIS    magnesium oxide (MAG-OX) 400 mg tablet Take 400 mg by mouth daily.  atorvastatin (LIPITOR) 40 mg tablet Take 40 mg by mouth daily.  amlodipine (NORVASC) 10 mg tablet Take 10 mg by mouth daily. Indications: HYPERTENSION    multivitamins-minerals-lutein (CENTRUM SILVER) Tab Take  by mouth. No current facility-administered medications for this visit. Review of Symptoms:    CONST  No weight change. No fever, chills, sweats    ENT No visual changes, URI sx, sore throat    CV  See HPI   RESP  No cough, or sputum, wheezing. Also see HPI   GI  No abdominal pain or change in bowel habits. No heartburn or dysphagia. No melena or rectal bleeding.   No dysuria, urgency, frequency, hematuria   MSKEL  No joint pain, swelling. No muscle pain. SKIN  No rash or lesions. NEURO  No headache, syncope, or seizure. No weakness, loss of sensation, or paresthesias. PSYCH  No low mood or depression  No anxiety. HE/LYMPH  No easy bruising, abnormal bleeding, or enlarged glands. Physical ExamPhysical Exam:    There were no vitals taken for this visit.   Gen: NAD  HEENT:  PERRL, throat clear  Neck: no adenopathy, no thyromegaly, no JVD   Heart:  Regular,Nl S1S2,  no murmur, gallop or rub.   Lungs:  clear  Abdomen:   Soft, non-tender, bowel sounds are active.   Extremities:  No edema  Pulse: symmetric  Neuro: A&O times 3, No focal neuro deficits    Cardiographics    ECG: NSR 64, old IMI, no acute changes      Labs:   Lab Results   Component Value Date/Time    Sodium 147 (H) 02/22/2018 09:20 AM    Sodium 144 10/19/2017 09:27 AM    Sodium 139 08/21/2017 09:45 AM    Sodium 143 06/15/2017 11:15 AM    Sodium 139 10/19/2010 04:00 AM    Potassium 4.1 02/22/2018 09:20 AM    Potassium 3.7 10/19/2017 09:27 AM    Potassium 4.2 08/21/2017 09:45 AM    Potassium 3.9 06/15/2017 11:15 AM    Potassium 3.6 10/19/2010 04:00 AM    Chloride 105 02/22/2018 09:20 AM    Chloride 104 10/19/2017 09:27 AM    Chloride 104 08/21/2017 09:45 AM    Chloride 104 06/15/2017 11:15 AM    Chloride 104 10/19/2010 04:00 AM    CO2 30 02/22/2018 09:20 AM    CO2 31 10/19/2017 09:27 AM    CO2 26 08/21/2017 09:45 AM    CO2 30 06/15/2017 11:15 AM    CO2 26 10/19/2010 04:00 AM    Anion gap 12 02/22/2018 09:20 AM    Anion gap 9 10/19/2017 09:27 AM    Anion gap 9 08/21/2017 09:45 AM    Anion gap 9 06/15/2017 11:15 AM    Anion gap 9 10/19/2010 04:00 AM    Glucose 145 (H) 02/22/2018 09:20 AM    Glucose 128 (H) 10/19/2017 09:27 AM    Glucose 142 (H) 08/21/2017 09:45 AM    Glucose 122 (H) 06/15/2017 11:15 AM    Glucose 136 (H) 10/19/2010 04:00 AM    BUN 17 02/22/2018 09:20 AM    BUN 18 10/19/2017 09:27 AM    BUN 17 08/21/2017 09:45 AM    BUN 15 06/15/2017 11:15 AM    BUN 9 10/19/2010 04:00 AM    Creatinine 0.79 02/22/2018 09:20 AM    Creatinine 0.85 10/19/2017 09:27 AM    Creatinine 0.76 08/21/2017 09:45 AM    Creatinine 0.90 06/15/2017 11:15 AM    Creatinine 0.9 10/19/2010 04:00 AM    BUN/Creatinine ratio 22 (H) 02/22/2018 09:20 AM    BUN/Creatinine ratio 21 (H) 10/19/2017 09:27 AM    BUN/Creatinine ratio 22 (H) 08/21/2017 09:45 AM    BUN/Creatinine ratio 17 06/15/2017 11:15 AM    BUN/Creatinine ratio 10 (L) 10/19/2010 04:00 AM    GFR est AA >60 02/22/2018 09:20 AM    GFR est AA >60 10/19/2017 09:27 AM    GFR est AA >60 08/21/2017 09:45 AM    GFR est AA >60 06/15/2017 11:15 AM    GFR est AA >60 10/19/2010 04:00 AM    GFR est non-AA >60 02/22/2018 09:20 AM    GFR est non-AA >60 10/19/2017 09:27 AM    GFR est non-AA >60 08/21/2017 09:45 AM    GFR est non-AA >60 06/15/2017 11:15 AM    GFR est non-AA >60 10/19/2010 04:00 AM    Calcium 9.2 02/22/2018 09:20 AM    Calcium 9.4 10/19/2017 09:27 AM    Calcium 9.0 08/21/2017 09:45 AM    Calcium 9.1 06/15/2017 11:15 AM    Calcium 7.8 (L) 10/19/2010 04:00 AM    Bilirubin, total 0.5 10/19/2017 09:27 AM    Bilirubin, total 0.5 06/15/2017 11:15 AM    Bilirubin, total 0.4 10/15/2010 02:00 PM    AST (SGOT) 18 10/19/2017 09:27 AM    AST (SGOT) 19 06/15/2017 11:15 AM    AST (SGOT) 18 10/15/2010 02:00 PM    Alk. phosphatase 80 10/19/2017 09:27 AM    Alk. phosphatase 77 06/15/2017 11:15 AM    Alk. phosphatase 142 (H) 10/15/2010 02:00 PM    Protein, total 8.5 (H) 10/19/2017 09:27 AM    Protein, total 8.1 06/15/2017 11:15 AM    Protein, total 7.5 10/15/2010 02:00 PM    Albumin 4.6 10/19/2017 09:27 AM    Albumin 4.1 08/21/2017 09:45 AM    Albumin 4.3 06/15/2017 11:15 AM    Albumin 4.0 10/15/2010 02:00 PM    Globulin 3.9 10/19/2017 09:27 AM    Globulin 3.8 06/15/2017 11:15 AM    Globulin 3.5 10/15/2010 02:00 PM    A-G Ratio 1.2 10/19/2017 09:27 AM    A-G Ratio 1.1 06/15/2017 11:15 AM    A-G Ratio 1.1 10/15/2010 02:00 PM    ALT (SGPT) 35 10/19/2017 09:27 AM    ALT (SGPT) 30 06/15/2017 11:15 AM    ALT (SGPT) 33 10/15/2010 02:00 PM     No results found for: CPK, CPKX, CPX  No results found for: CHOL, CHOLX, CHLST, CHOLV, 942347, HDL, LDL, LDLC, DLDLP, TGLX, TRIGL, TRIGP, CHHD, CHHDX  No results found for this or any previous visit. Assessment:         Patient Active Problem List    Diagnosis Date Noted    Osteopenia due to cancer therapy 06/15/2017    Vasovagal syncope 10/11/2016    Left kidney mass 06/08/2016    Rash of neck 01/12/2016    Use of exemestane (Aromasin) 09/15/2015    S/P left hemicolectomy 11/11/2010    S/P bilateral mastectomy 10/18/2010    Essential hypertension, benign 10/12/2010    Dyslipidemia (high LDL; low HDL) 10/12/2010    S/P left hemicolectomy 10/12/2010    Family history of coronary artery disease 10/12/2010    Malignant neoplasm of female breast ([facility] 09/02/2010       Hx hypertension, dyslipidemia, colon ca (s/p hemicolectomy), breast ca--s/p bilat mastectomies, XRT/Chemo; No known cardiac hx. Episode of syncope after spell of abdominal pain, N/V, diarrhea, cramping and got up from commode--passed out. Initial ED w/u neg--told likely vasovagal. One episode 30-40 yrs ago.  Seen by Dr. Aure Cook testing: ECHO 10/6/16--normal LV size/walls, LVEF 65-70, mild MR, RSVP 42. Carotid dopplers 97/4/84--7-01% DIONY, 12-37% LICA, bilat antegrade vertebrals. Holter monitor 10/16 with NSR, PAC's, one 3 beat PAT run. Had some issues with LE edema, improved after reduced Na. Over this  past weekend had confusion/weakness--prob TIA, at [facility] with w/u--CT, MRI, MRA, carotid dopplers. --felt c/w TIA. Lots of stress--mother with Ca, sick and was getting ready to go to see ([place]). Apparently had Echo as part of w/u, but results not currently available. No CV sx or complaints currently. Continues to see PCP and Oncology. Appt with Dr. Suraj Leigh next week. Plan:      Will review outside Echo when available  [facility] records reviewed  To F/u next week as planned with Dr. Suraj Leigh  RTC here 6 mos, sooner prn    Froilan Gandhi MD

## 2018-07-13 NOTE — MR AVS SNAPSHOT
Yelitza Gutierrez 
 
 
 [address] [id number] [phone] Patient: Sinai Jacques MRN: [id number] Visit Information Date & Time Provider Department Dept. Phone Encounter #  
 7/13/2018 10:20 AM Dennise Blair, [address] [place] [address] [facility] [phone] [id number] Upcoming Health Maintenance Date Due Hepatitis C Screening 1950 DTaP/Tdap/Td series (1 - Tdap) 11/8/1969 FOBT Q 1 YEAR AGE 50-75 11/8/1998 ZOSTER VACCINE AGE 60> 9/8/2008 BREAST CANCER SCRN MAMMOGRAM 9/24/2012 GLAUCOMA SCREENING Q2Y 11/8/2013 Pneumococcal 65+ High/Highest Risk (1 of 2 - PCV13) 11/8/2013 MEDICARE YEARLY EXAM 3/14/2018 Influenza Age 5 to Adult 8/1/2018 Allergies as of 7/13/2018  Review Complete On: 7/13/2018 By: Pina Carl LPN No Known Allergies Current Immunizations  Reviewed on 5/30/2018 Name Date Influenza Vaccine 11/22/2017, 10/1/2016 Not reviewed this visit You Were Diagnosed With   
  
 Codes Comments Transient cerebral ischemia, unspecified type    -  Primary ICD-10-CM: G45.9 ICD-9-CM: 435.9 Vasovagal syncope     ICD-10-CM: R55 
ICD-9-CM: 780.2 Essential hypertension, benign     ICD-10-CM: I10 
ICD-9-CM: 401.1 Dyslipidemia (high LDL; low HDL)     ICD-10-CM: E78.5 ICD-9-CM: 272.4 S/P bilateral mastectomy     ICD-10-CM: Z90.13 ICD-9-CM: V45.71 S/P left hemicolectomy     ICD-10-CM: Z90.49 ICD-9-CM: V45.89 Malignant neoplasm of left breast in female, estrogen receptor positive, unspecified site [facility]     ICD-10-CM: C50.912, Z17.0 ICD-9-CM: 174.9, V86.0 Malignant neoplasm of colon, unspecified part of colon ([facility]     ICD-10-CM: C18.9 ICD-9-CM: 153.9 Vitals  BP Pulse Resp Height(growth percentile) Weight(growth percentile) SpO2  
 140/80 (BP 1 Location: Right arm, BP Patient Position: Sitting) 64 18 5' (1.524 m) 166 lb (75.3 kg) 98% BMI OB Status Smoking Status 32.42 kg/m2 Postmenopausal Former Smoker Vitals History BMI and BSA Data Body Mass Index Body Surface Area  
 32.42 kg/m 2 1.79 m 2 Preferred Pharmacy Pharmacy Name [id number] [phone] Your Updated Medication List  
  
   
This list is accurate as of 7/13/18 11:18 AM.  Always use your most recent med list. amLODIPine 10 mg tablet Commonly known as:  Shaista Darting Take 10 mg by mouth daily. Indications: HYPERTENSION  
  
 aspirin 325 mg tablet Commonly known as:  ASPIRIN Take 325 mg by mouth daily. CALTRATE 600 + D PO Take  by mouth. CENTRUM SILVER Tab tablet Generic drug:  multivitamins-minerals-lutein Take  by mouth.  
  
 exemestane 25 mg tablet Commonly known as:  AROMASIN  
take 1 tablet by mouth once daily LIPITOR 40 mg tablet Generic drug:  atorvastatin Take 40 mg by mouth daily. magnesium oxide 400 mg tablet Commonly known as:  MAG-OX Take 400 mg by mouth daily. PROLIA 60 mg/mL injection Generic drug:  denosumab 60 mg by SubCUTAneous route. Every 6 months   Indications: POST-MENOPAUSAL OSTEOPOROSIS We Performed the Following AMB POC EKG ROUTINE W/ 12 LEADS, INTER & REP [49063 CPT(R)] To-Do List   
 08/23/2018 9:30 AM  
  Appointment with Yusef Hadley 2 at [facility] ([phone]) Introducing [facility]! [facility] introduces Flared3D patient portal. Now you can access parts of your medical record, email your doctor's office, and request medication refills online. 1. In your internet browser, go to [email or web address] 2. Click on the First Time User? Click Here link in the Sign In box. You will see the New Member Sign Up page. 3. Enter your Flared3D Access Code exactly as it appears below.  You will not need to use this code after youve completed the sign-up process. If you do not sign up before the expiration date, you must request a new code. · [employer] Access Code: [email or web address] Expires: 8/20/2018  3:41 PM 
 
4. Enter the last four digits of your Social Security Number (xxxx) and Date of Birth (mm/dd/yyyy) as indicated and click Submit. You will be taken to the next sign-up page. 5. Create a SportXast ID. This will be your [employer] login ID and cannot be changed, so think of one that is secure and easy to remember. 6. Create a [employer]ast password. You can change your password at any time. 7. Enter your Password Reset Question and Answer. This can be used at a later time if you forget your password. 8. Enter your e-mail address. You will receive e-mail notification when new information is available in [address] 9. Click Sign Up. You can now view and download portions of your medical record. 10. Click the Download Summary menu link to download a portable copy of your medical information. If you have questions, please visit the Frequently Asked Questions section of the [employer] website. Remember, Sport[employer]ast is NOT to be used for urgent needs. For medical emergencies, dial 911. Now available from your iPhone and Android! Please provide this summary of care documentation to your next provider. Your primary care clinician is listed as [facility]. If you have any questions after today's visit, please call [phone].

## 2018-07-20 ENCOUNTER — TELEPHONE (OUTPATIENT)
Dept: CARDIOLOGY CLINIC | Age: 68
End: 2018-07-20

## 2018-07-20 NOTE — TELEPHONE ENCOUNTER
----- Message from Valentín Crump MD sent at 7/17/2018  2:33 PM EDT -----  Regarding: echo from [address] [place], [place] [address]jimmy A[facility] Echo from [facility] 7/9/18 reviewed--looks fine (normal LVEF, valves, etc). Thanks [address] [place] [address] with the pt. Verified patient with two patient identifiers. Results given and questions answered. Patient verbalized understanding.

## 2018-09-17 RX ORDER — EXEMESTANE 25 MG/1
TABLET ORAL
Qty: 30 TAB | Refills: 6 | Status: SHIPPED | OUTPATIENT
Start: 2018-09-17 | End: 2019-07-17

## 2018-11-13 NOTE — PROGRESS NOTES
Melisa uLa is a 77 y.o. female here for 5 month follow up, she has had bilateral mastectomies. Last Prolia was 9/6/18. She states she gets tired in the evenings, this has been going on since she was taking chemo. She had itching and a rash after receiving her flu shot this year, she took benadryl.     Key Oncology Meds             exemestane (AROMASIN) 25 mg tablet take 1 tablet by mouth once daily

## 2018-11-14 ENCOUNTER — OFFICE VISIT (OUTPATIENT)
Dept: ONCOLOGY | Age: 68
End: 2018-11-14

## 2018-11-14 VITALS
WEIGHT: 168.2 LBS | RESPIRATION RATE: 18 BRPM | BODY MASS INDEX: 33.02 KG/M2 | HEART RATE: 70 BPM | SYSTOLIC BLOOD PRESSURE: 149 MMHG | HEIGHT: 60 IN | OXYGEN SATURATION: 98 % | DIASTOLIC BLOOD PRESSURE: 68 MMHG | TEMPERATURE: 97.5 F

## 2018-11-14 DIAGNOSIS — C50.019 MALIGNANT NEOPLASM OF NIPPLE OF BREAST IN FEMALE, UNSPECIFIED ESTROGEN RECEPTOR STATUS, UNSPECIFIED LATERALITY (HCC): Primary | ICD-10-CM

## 2018-11-14 DIAGNOSIS — M81.0 OSTEOPOROSIS WITHOUT CURRENT PATHOLOGICAL FRACTURE, UNSPECIFIED OSTEOPOROSIS TYPE: ICD-10-CM

## 2018-11-14 DIAGNOSIS — C18.9 MALIGNANT NEOPLASM OF COLON, UNSPECIFIED PART OF COLON (HCC): ICD-10-CM

## 2018-11-14 RX ORDER — METFORMIN HYDROCHLORIDE 500 MG/1
1 TABLET, EXTENDED RELEASE ORAL DAILY
Refills: 0 | COMMUNITY
Start: 2018-10-30

## 2018-11-14 NOTE — PROGRESS NOTES
Hematology Oncology Progress Note    Lencho Rose is a 77 y.o. female with history of stage IIA T2N0M0 invasive ductal carcinoma of left breast underwent MRM of left breast in 09/2010 and histopathology was suggestive of poorly differentiated infiltrating ductal carcinoma, 5 cm size and 0/5 sentinel LNs involved, ER & MN positive Her 2 negative. She underwent oncotype Dx with recurrence score of 32 suggestive of high risk (21%) of distant recurrence. She then completed 4 cycles of adjuvant chemotherapy with docetaxel + cytoxan in 06/2011 and adjuvant radiation therapy until 01/2011. She was initially treated with Arimidex until 07/2012 but was dicontinued because she had osteoporosis and couldn't tolerate Fosamax. She was switched to Tamoxifen which was continued until 01/2015. Tamoxifen was discontinued due to findings of endometrial polyp and proliferative endothelium requiring D&C and eventually hysterectomy. She was then switched to Arimidex since 2/5/15 and has been on prophylaxis with Prolia every 6 months for osteoporosis. She also has history of Colon cancer s/p hemicolectomy in 1998. She was apparently started on adjuvant chemotherapy but couldn't tolerate and hence discontinued. Her last colonoscopy was on 12/11/2015 which showed hemorroids. She had PET done on 11/2/17 due to ? ? Findings of RLL lung nodule on CT which showed a non FDG avid stable RLL lung nodule. Her last Dexa scan was on 7/10/17 which showed osteopenia. Her last Prolia was on 9/7/18. Chart notes reviewed since last visit. Chief complaints: She denies any worsening bone pains, hotflashes, myalgias/ arthralgias, cough, SOB, headache, bloody or black stools, loss of weight or apetite.     Visit Vitals  /68   Pulse 70   Temp 97.5 °F (36.4 °C) (Oral)   Resp 18   Ht 5' (1.524 m)   Wt 168 lb 3.2 oz (76.3 kg)   SpO2 98%   BMI 32.85 kg/m²     Review of Systems:  Constitutional No fevers, chills, night sweats, excessive fatigue or weight loss. Allergic/Immunologic No recent allergic reactions   Eyes No significant visual difficulties. No diplopia. ENMT No problems with hearing, no sore throat, no sinus drainage. Endocrine No hot flashes or night sweats. No cold intolerance, polyuria, or polydipsia   Hematologic/Lymphatic No easy bruising or bleeding. The patient denies any tender or palpable lymph nodes   Breasts No abnormal masses of breast, nipple discharge or pain. Respiratory No dyspnea on exertion, orthopnea, chest pain, cough or hemoptysis. Cardiovascular No anginal chest pain, irregular heart beat, tachycardia, palpitations or orthopnea. Gastrointestinal No nausea, vomiting, diarrhea, constipation, cramping, dysphagia, reflux, heartburn, GI bleeding, or early satiety. No change in bowel habits. Genitourinary (M) No hematuria, dysuria, increased frequency, urgency, hesitancy or incontinence. Musculoskeletal No joint pain, swelling or redness. No decreased range of motion. Integumentary No chronic rashes, inflammation, ulcerations, pruritus, petechiae, purpura, ecchymoses, or skin changes. Neurologic No headache, blurred vision, and no areas of focal weakness or numbness. Normal gait. No sensory problems. Psychiatric No insomnia, depression, tyron or mood swings. No psychotropic drugs. Physical Examination:  ECOG PS 0  Breast exam: bilateral mastectomy scar, no masses or axillary lymphadenopathy appreciated. Constitutional Alert, cooperative, oriented. Mood and affect appropriate. Appears close to chronological age. Well nourished. Well developed. Head Normocephalic; no scars   Eyes Conjunctivae and sclerae are clear and without icterus. Pupils are reactive and equal.   ENMT Sinuses are nontender. No oral exudates, ulcers, masses, thrush or mucositis. Oropharynx clear. Tongue normal.   Neck Supple without masses or thyromegaly. No jugular venous distension.    Hematologic/Lymphatic No petechiae or purpura. No tender or palpable lymph nodes in the cervical, supraclavicular, axillary or inguinal area. Respiratory Lungs are clear to auscultation without rhonchi or wheezing. Cardiovascular Regular rate and rhythm of heart without murmurs, gallops or rubs. Chest / Line Site Chest is symmetric with no chest wall deformities. Abdomen Non-tender, non-distended, no masses, ascites or hepatosplenomegaly. Good bowel sounds. No guarding or rebound tenderness. No pulsatile masses. Musculoskeletal No tenderness or swelling, normal range of motion without obvious weakness. Extremities No visible deformities, no cyanosis, clubbing or edema. Skin No rashes, scars, or lesions suggestive of malignancy. No petechiae, purpura, or ecchymoses. No excoriations. Neurologic No sensory or motor deficits, normal cerebellar function, normal gait, cranial nerves intact. Psychiatric Alert and oriented times three. Coherent speech. Verbalizes understanding of our discussions today. Labs: Pending    Imaging:  Study Result     PET/CT SCAN  11/2/17     PROCEDURE: Following IV injection of 10.9 mCi 18 Fluoro 2 deoxyglucose (FDG) and  a standard uptake delay, PET imaging is performed from head to thigh and axial,  sagittal and coronal images were acquired. Unenhanced CT is obtained for  anatomic localization, and attenuation correction of the PET scan. Patient  preprocedure blood glucose level: 97mg/dL.     CORRELATIVE IMAGING STUDIES: CT chest abdomen and pelvis 2016.     PRIOR PET:     HISTORY: The study is requested for initial treatment strategy///subsequent  treatment strategy. Biopsy confirmed dx or s&sx .     FINDINGS:     HEAD/NECK: No apparent foci of abnormal hypermetabolism. Cerebral evaluation is  limited by normal intense activity.     CHEST: No foci of abnormal hypermetabolism. There is a 6 mm nodule in the right  lower lobe which does not demonstrate increased metabolic activity.  In the left  upper lobe anteriorly there are subpleural linear densities may be related to  radiation.     ABDOMEN/PELVIS: No foci of abnormal hypermetabolism. Small calcifications are  noted within the uterus most likely small calcified uterine leiomyomata.     SKELETON: No foci of abnormal hypermetabolism in the axial and visualized  appendicular skeleton.     IMPRESSION: No Foci of Abnormal Hypermetabolism. There is a 6 mm nodule right  lower lobe which does not demonstrate increased metabolic activity. This was  present on a CT dated 6/21/2016.        Study Result     BONE DENSITOMETRY (DEXA) STUDY 7/10/17     INDICATION:  Postmenopausal.     COMPARISON:  8/25/2015.     TECHNIQUE:  Utilizing a [employer] Mob[employer] QDR Discovery-SL densitometer, bone density  measurements were obtained in the lumbar spine and at both hips.     FINDINGS:     T score value in the left femoral neck is -1.2. T score value in the right femoral neck is -1.3. T score value in the lumbar spine range from -1.9 to -1. 4.     IIMPRESSION:     Osteopenia.     Bone mineral density of the total left hip has increased 0.3% since the prior  examination. Bone mineral density of the total right hip has increased 4.4% since the prior  examination. Bone mineral density of the total lumbar spine has increased 4.7% since the  prior examination. Assessment and Plan:     1. Stage IIA invasive ductal carcinoma of right breast, ER & NV positive Her 2 negative:    - She was initially diagnosed in 09/2010 and completed her treatments as of 01/2011. - She was treated with Arimidex for 1 year and was switched to Tamoxifen for 2.5 years until 01/2015. She was switched back to Arimidex since 02/2015 and has been on it since then along with Prolia every 6 months for osteoporosis. - She is tolerating Arimidex otherwise very well. Can consider continuing Arimidex for total 5-7 years.   - She underwent bilateral mastectomies and hence no role for screening mammograms.  - She will return to the clinic in 6 months with repeat CBC, CMP prior to the visit. 2. History of Colon cancer s/p hemicolectomy and was unable to tolerate adjuvant chemo in 1998:    - Her last colonscopy was on 12/11/2015 for rectal bleeding and was found to be secondary to hemorroids.  - CEA requested today, will follow up. - Advised her to continue her 5 yearly surveillance colonoscopies. 3. Osteoporosis/ Osteopenia:    - Last Dexa scan was done 07/2017and was suggestive of Osteopenia. She will due for a repeat in 07/2019.  - Her last Prolia was in 09/2018 and will due for next dose in 03/2019.   - Advised her to continue oral calcium + vitamin D supplements.

## 2019-01-07 ENCOUNTER — TELEPHONE (OUTPATIENT)
Dept: ONCOLOGY | Age: 69
End: 2019-01-07

## 2019-01-07 NOTE — TELEPHONE ENCOUNTER
Patient would like info on how to get the price of her medication Exemestane (Aromasin) lowered. The price has went up $100. Patients states that she can not afford that. Please call back at  [phone].

## 2019-01-08 NOTE — TELEPHONE ENCOUNTER
HIPAA verified. Returned patients call, she will call insurance  Company to see what/if anything can be done to get price down. She will call back if there is anything I can do to help.

## 2019-01-14 NOTE — TELEPHONE ENCOUNTER
HIPAA verified. Spoke with patient after numerous phone calls to Medicare and [employer] to try to get a tier exception for her Aromasin. Many hours have been spent trying to get this accomplished for the patient. Unfortunately I have run into only road blocks. Per David Ballard who was the last representative for [employer] I spoke with he recommends the patient call Member support. Relayed this to patient. She will call again. If she is unable to get this resolved, she will call to try to get an appointment with the Doctor to try to have medication changed.

## 2019-02-18 ENCOUNTER — OFFICE VISIT (OUTPATIENT)
Dept: CARDIOLOGY CLINIC | Age: 69
End: 2019-02-18

## 2019-02-18 VITALS
HEART RATE: 72 BPM | HEIGHT: 60 IN | BODY MASS INDEX: 32.59 KG/M2 | RESPIRATION RATE: 12 BRPM | WEIGHT: 166 LBS | DIASTOLIC BLOOD PRESSURE: 80 MMHG | SYSTOLIC BLOOD PRESSURE: 140 MMHG | OXYGEN SATURATION: 98 %

## 2019-02-18 DIAGNOSIS — I10 ESSENTIAL HYPERTENSION, BENIGN: ICD-10-CM

## 2019-02-18 DIAGNOSIS — I65.23 BILATERAL CAROTID ARTERY STENOSIS: Primary | ICD-10-CM

## 2019-02-18 DIAGNOSIS — E78.5 DYSLIPIDEMIA (HIGH LDL; LOW HDL): ICD-10-CM

## 2019-02-18 DIAGNOSIS — C50.912 MALIGNANT NEOPLASM OF LEFT BREAST IN FEMALE, ESTROGEN RECEPTOR POSITIVE, UNSPECIFIED SITE OF BREAST (HCC): ICD-10-CM

## 2019-02-18 DIAGNOSIS — Z90.13 S/P BILATERAL MASTECTOMY: ICD-10-CM

## 2019-02-18 DIAGNOSIS — Z90.49 S/P LEFT HEMICOLECTOMY: ICD-10-CM

## 2019-02-18 DIAGNOSIS — G45.9 TRANSIENT CEREBRAL ISCHEMIA, UNSPECIFIED TYPE: ICD-10-CM

## 2019-02-18 DIAGNOSIS — Z17.0 MALIGNANT NEOPLASM OF LEFT BREAST IN FEMALE, ESTROGEN RECEPTOR POSITIVE, UNSPECIFIED SITE OF BREAST (HCC): ICD-10-CM

## 2019-02-18 NOTE — PROGRESS NOTES
Jennifer Poole is a 77 y.o. female is here for routine f/u. Hx hypertension, dyslipidemia, colon ca (s/p hemicolectomy), breast ca--s/p bilat mastectomies, XRT/Chemo; No known cardiac hx. Episode of syncope after spell of abdominal pain, N/V, diarrhea, cramping and got up from commode--passed out. Initial ED w/u neg--told likely vasovagal. One episode 30-40 yrs ago. Seen by Dr. Oswaldo White testing: ECHO 10/6/16--normal LV size/walls, LVEF 65-70, mild MR, RSVP 42. Carotid dopplers 84/0/43--9-69% DIONY, 91-71% LICA, bilat antegrade vertebrals. Holter monitor 10/16 with NSR, PAC's, one 3 beat PAT run. Had some issues with LE edema, improved after reduced Na. Over past summer had confusion/weakness--prob TIA, at [facility] with neg CV w/u. Apparently had Echo as part of w/u, but results not currently available. No CV sx or complaints currently. Continues to see PCP and Oncology. The patient denies chest pain/ shortness of breath, orthopnea, PND, LE edema, palpitations, syncope, presyncope.        Patient Active Problem List    Diagnosis Date Noted    Osteopenia due to cancer therapy 06/15/2017    Vasovagal syncope 10/11/2016    Left kidney mass 06/08/2016    Rash of neck 01/12/2016    Use of exemestane (Aromasin) 09/15/2015    S/P left hemicolectomy 11/11/2010    S/P bilateral mastectomy 10/18/2010    Essential hypertension, benign 10/12/2010    Dyslipidemia (high LDL; low HDL) 10/12/2010    S/P left hemicolectomy 10/12/2010    Family history of coronary artery disease 10/12/2010    Malignant neoplasm of female breast ([facility] [address] 09/02/2010      Suze Wen MD  Past Medical History:   Diagnosis Date    Arthritis     Breast CA ([address]     left    Cancer ([address] 1998    colon cancer    Dyslipidemia (high LDL; low HDL) 10/12/2010    Essential hypertension, benign 10/12/2010    Family history of coronary artery disease 10/12/2010    FH: bowel obstruction 9/1/2015    Hypercholesteremia     Hypertension     Ill-defined condition     elevated cholesterol    Postmenopausal     S/P chemotherapy, time since greater than 12 weeks     S/P left hemicolectomy 10/12/2010      Past Surgical History:   Procedure Laterality Date    BREAST SURGERY PROCEDURE UNLISTED      double mastectomy    HX APPENDECTOMY      HX COLECTOMY  1998    HX COLONOSCOPY      HX DILATION AND CURETTAGE      HX GYN  2000    uterine ablation    HX OOPHORECTOMY      part of left ovary removed    HX OTHER SURGICAL  2011    port a cath    HX SALPINGO-OOPHORECTOMY       No Known Allergies   Family History   Problem Relation Age of Onset    Cancer Father 79        colon    Hypertension Mother    [facility] Arthritis-rheumatoid Mother     Elevated Lipids Mother     No Known Problems Sister     Elevated Lipids Brother     Hypertension Brother     Hypertension Sister     Elevated Lipids Sister     No Known Problems Brother     Heart Disease Brother       Social History     Socioeconomic History    Marital status:      Spouse name: Not on file    Number of children: Not on file    Years of education: Not on file    Highest education level: Not on file   Social Needs    Financial resource strain: Not on file    Food insecurity - worry: Not on file    Food insecurity - inability: Not on file   [employer] needs - medical: Not on file   [employer] needs - non-medical: Not on file   Occupational History    Not on file   Tobacco Use    Smoking status: Former Smoker     Packs/day: 0.50     Years: 10.00     Pack years: 5.00     Last attempt to quit: 1980     Years since quittin.1    Smokeless tobacco: Never Used    Tobacco comment: quit 29 years ago   Substance and Sexual Activity    Alcohol use: No    Drug use: No    Sexual activity: Not on file     Comment: menses age 13, menopause 44 age, first birth 32,    Other Topics Concern    Not on file   Social History Narrative    Not on file      Current Outpatient Medications   Medication Sig    metFORMIN ER (GLUCOPHAGE XR) 500 mg tablet Take 1 Tab by mouth daily.  CALCIUM CARBONATE/VITAMIN D3 (CALTRATE 600 + D PO) Take  by mouth.  aspirin (ASPIRIN) 325 mg tablet Take 325 mg by mouth daily.  Denosumab (PROLIA) 60 mg/mL injection 60 mg by SubCUTAneous route. Every 6 months   Indications: POST-MENOPAUSAL OSTEOPOROSIS    magnesium oxide (MAG-OX) 400 mg tablet Take 400 mg by mouth daily.  atorvastatin (LIPITOR) 40 mg tablet Take 40 mg by mouth daily.  amlodipine (NORVASC) 10 mg tablet Take 10 mg by mouth daily. Indications: HYPERTENSION    multivitamins-minerals-lutein (CENTRUM SILVER) Tab Take  by mouth daily.  exemestane (AROMASIN) 25 mg tablet take 1 tablet by mouth once daily     No current facility-administered medications for this visit. Review of Symptoms:    CONST  No weight change. No fever, chills, sweats    ENT No visual changes, URI sx, sore throat    CV  See HPI   RESP  No cough, or sputum, wheezing. Also see HPI   GI  No abdominal pain or change in bowel habits. No heartburn or dysphagia. No melena or rectal bleeding.   No dysuria, urgency, frequency, hematuria   MSKEL  No joint pain, swelling. No muscle pain. SKIN  No rash or lesions. NEURO  No headache, syncope, or seizure. No weakness, loss of sensation, or paresthesias. PSYCH  No low mood or depression  No anxiety. HE/LYMPH  No easy bruising, abnormal bleeding, or enlarged glands.         Physical ExamPhysical Exam:    Visit Vitals  /80 (BP 1 Location: Right arm, BP Patient Position: Sitting)   Pulse 72   Resp 12   Ht 5' (1.524 m)   Wt 166 lb (75.3 kg)   SpO2 98%   BMI 32.42 kg/m²     Gen: NAD  HEENT:  PERRL, throat clear  Neck: no adenopathy, no thyromegaly, no JVD   Heart:  Regular,Nl S1S2,  no murmur, gallop or rub.   Lungs:  clear  Abdomen:   Soft, non-tender, bowel sounds are active.   Extremities:  No edema  Pulse: symmetric  Neuro: A&O times 3, No focal neuro deficits    Labs:   Lab Results   Component Value Date/Time    Sodium 141 11/14/2018 09:30 AM    Sodium 143 09/06/2018 09:30 AM    Sodium 147 (H) 02/22/2018 09:20 AM    Sodium 144 10/19/2017 09:27 AM    Sodium 139 08/21/2017 09:45 AM    Potassium 4.0 11/14/2018 09:30 AM    Potassium 3.5 09/06/2018 09:30 AM    Potassium 4.1 02/22/2018 09:20 AM    Potassium 3.7 10/19/2017 09:27 AM    Potassium 4.2 08/21/2017 09:45 AM    Chloride 104 11/14/2018 09:30 AM    Chloride 103 09/06/2018 09:30 AM    Chloride 105 02/22/2018 09:20 AM    Chloride 104 10/19/2017 09:27 AM    Chloride 104 08/21/2017 09:45 AM    CO2 27 11/14/2018 09:30 AM    CO2 28 09/06/2018 09:30 AM    CO2 30 02/22/2018 09:20 AM    CO2 31 10/19/2017 09:27 AM    CO2 26 08/21/2017 09:45 AM    Anion gap 10 11/14/2018 09:30 AM    Anion gap 12 09/06/2018 09:30 AM    Anion gap 12 02/22/2018 09:20 AM    Anion gap 9 10/19/2017 09:27 AM    Anion gap 9 08/21/2017 09:45 AM    Glucose 124 (H) 11/14/2018 09:30 AM    Glucose 144 (H) 09/06/2018 09:30 AM    Glucose 145 (H) 02/22/2018 09:20 AM    Glucose 128 (H) 10/19/2017 09:27 AM    Glucose 142 (H) 08/21/2017 09:45 AM    BUN 16 11/14/2018 09:30 AM    BUN 12 09/06/2018 09:30 AM    BUN 17 02/22/2018 09:20 AM    BUN 18 10/19/2017 09:27 AM    BUN 17 08/21/2017 09:45 AM    Creatinine 0.83 11/14/2018 09:30 AM    Creatinine 0.96 09/06/2018 09:30 AM    Creatinine 0.79 02/22/2018 09:20 AM    Creatinine 0.85 10/19/2017 09:27 AM    Creatinine 0.76 08/21/2017 09:45 AM    BUN/Creatinine ratio 19 11/14/2018 09:30 AM    BUN/Creatinine ratio 13 09/06/2018 09:30 AM    BUN/Creatinine ratio 22 (H) 02/22/2018 09:20 AM    BUN/Creatinine ratio 21 (H) 10/19/2017 09:27 AM    BUN/Creatinine ratio 22 (H) 08/21/2017 09:45 AM    GFR est AA >60 11/14/2018 09:30 AM    GFR est AA >60 09/06/2018 09:30 AM    GFR est AA >60 02/22/2018 09:20 AM    GFR est AA >60 10/19/2017 09:27 AM    GFR est AA >60 08/21/2017 09:45 AM GFR est non-AA >60 11/14/2018 09:30 AM    GFR est non-AA 58 (L) 09/06/2018 09:30 AM    GFR est non-AA >60 02/22/2018 09:20 AM    GFR est non-AA >60 10/19/2017 09:27 AM    GFR est non-AA >60 08/21/2017 09:45 AM    Calcium 9.1 11/14/2018 09:30 AM    Calcium 9.7 09/06/2018 09:30 AM    Calcium 9.2 02/22/2018 09:20 AM    Calcium 9.4 10/19/2017 09:27 AM    Calcium 9.0 08/21/2017 09:45 AM    Bilirubin, total 0.4 11/14/2018 09:30 AM    Bilirubin, total 0.5 10/19/2017 09:27 AM    Bilirubin, total 0.5 06/15/2017 11:15 AM    Bilirubin, total 0.4 10/15/2010 02:00 PM    AST (SGOT) 19 11/14/2018 09:30 AM    AST (SGOT) 18 10/19/2017 09:27 AM    AST (SGOT) 19 06/15/2017 11:15 AM    AST (SGOT) 18 10/15/2010 02:00 PM    Alk. phosphatase 78 11/14/2018 09:30 AM    Alk. phosphatase 80 10/19/2017 09:27 AM    Alk. phosphatase 77 06/15/2017 11:15 AM    Alk. phosphatase 142 (H) 10/15/2010 02:00 PM    Protein, total 7.8 11/14/2018 09:30 AM    Protein, total 8.5 (H) 10/19/2017 09:27 AM    Protein, total 8.1 06/15/2017 11:15 AM    Protein, total 7.5 10/15/2010 02:00 PM    Albumin 4.3 11/14/2018 09:30 AM    Albumin 4.2 09/06/2018 09:30 AM    Albumin 4.6 10/19/2017 09:27 AM    Albumin 4.1 08/21/2017 09:45 AM    Albumin 4.3 06/15/2017 11:15 AM    Globulin 3.5 11/14/2018 09:30 AM    Globulin 3.9 10/19/2017 09:27 AM    Globulin 3.8 06/15/2017 11:15 AM    Globulin 3.5 10/15/2010 02:00 PM    A-G Ratio 1.2 11/14/2018 09:30 AM    A-G Ratio 1.2 10/19/2017 09:27 AM    A-G Ratio 1.1 06/15/2017 11:15 AM    A-G Ratio 1.1 10/15/2010 02:00 PM    ALT (SGPT) 26 11/14/2018 09:30 AM    ALT (SGPT) 35 10/19/2017 09:27 AM    ALT (SGPT) 30 06/15/2017 11:15 AM    ALT (SGPT) 33 10/15/2010 02:00 PM     No results found for: CPK, CPKX, CPX  No results found for: CHOL, CHOLX, CHLST, CHOLV, [id number], HDL, LDL, LDLC, DLDLP, TGLX, TRIGL, TRIGP, CHHD, CHHDX  No results found for this or any previous visit.     Assessment:         Patient Active Problem List    Diagnosis Date Noted    Osteopenia due to cancer therapy 06/15/2017    Vasovagal syncope 10/11/2016    Left kidney mass 06/08/2016    Rash of neck 01/12/2016    Use of exemestane (Aromasin) 09/15/2015    S/P left hemicolectomy 11/11/2010    S/P bilateral mastectomy 10/18/2010    Essential hypertension, benign 10/12/2010    Dyslipidemia (high LDL; low HDL) 10/12/2010    S/P left hemicolectomy 10/12/2010    Family history of coronary artery disease 10/12/2010    Malignant neoplasm of female breast ([facility] [address] 09/02/2010      Hx hypertension, dyslipidemia, colon ca (s/p hemicolectomy), breast ca--s/p bilat mastectomies, XRT/Chemo; No known cardiac hx. Episode of syncope after spell of abdominal pain, N/V, diarrhea, cramping and got up from commode--passed out. Initial ED w/u neg--told likely vasovagal. One episode 30-40 yrs ago. Seen by Dr. Sirena Olmstead testing: ECHO 10/6/16--normal LV size/walls, LVEF 65-70, mild MR, RSVP 42. Carotid dopplers 01/4/31--1-07% DIONY, 69-36% LICA, bilat antegrade vertebrals. Holter monitor 10/16 with NSR, PAC's, one 3 beat PAT run. Had some issues with LE edema, improved after reduced Na. Over past summer had confusion/weakness--prob TIA, at [facility] with neg CV w/u. Apparently had Echo as part of w/u, but results not currently available. No CV sx or complaints currently. Continues to see PCP and Oncology. Plan:     Doing well with no adverse cardiac symptoms. Repeat Carotid dopplers in June 2019 (MRA last June after TIA with moderate plaque)  Lipids and labs followed by PCP. Continue current care and f/u in 6 months.     Trudi Clements MD

## 2019-05-15 ENCOUNTER — TELEPHONE (OUTPATIENT)
Dept: ONCOLOGY | Age: 69
End: 2019-05-15

## 2019-05-15 ENCOUNTER — OFFICE VISIT (OUTPATIENT)
Dept: ONCOLOGY | Age: 69
End: 2019-05-15

## 2019-05-15 VITALS
WEIGHT: 167.6 LBS | RESPIRATION RATE: 19 BRPM | HEART RATE: 67 BPM | DIASTOLIC BLOOD PRESSURE: 68 MMHG | SYSTOLIC BLOOD PRESSURE: 143 MMHG | HEIGHT: 60 IN | TEMPERATURE: 97 F | BODY MASS INDEX: 32.9 KG/M2 | OXYGEN SATURATION: 100 %

## 2019-05-15 DIAGNOSIS — Z90.49 S/P LEFT HEMICOLECTOMY: ICD-10-CM

## 2019-05-15 DIAGNOSIS — C50.912 MALIGNANT NEOPLASM OF LEFT BREAST IN FEMALE, ESTROGEN RECEPTOR POSITIVE, UNSPECIFIED SITE OF BREAST (HCC): Primary | ICD-10-CM

## 2019-05-15 DIAGNOSIS — Z17.0 MALIGNANT NEOPLASM OF LEFT BREAST IN FEMALE, ESTROGEN RECEPTOR POSITIVE, UNSPECIFIED SITE OF BREAST (HCC): Primary | ICD-10-CM

## 2019-05-15 RX ORDER — LAMOTRIGINE 25 MG/1
500 TABLET ORAL ONCE
Status: CANCELLED | OUTPATIENT
Start: 2019-06-05

## 2019-05-15 RX ORDER — LAMOTRIGINE 25 MG/1
500 TABLET ORAL ONCE
Status: CANCELLED | OUTPATIENT
Start: 2019-06-19 | End: 2019-06-19

## 2019-05-15 RX ORDER — LAMOTRIGINE 25 MG/1
500 TABLET ORAL ONCE
Status: CANCELLED | OUTPATIENT
Start: 2019-05-22 | End: 2019-05-22

## 2019-05-15 RX ORDER — LAMOTRIGINE 25 MG/1
500 TABLET ORAL ONCE
Status: CANCELLED | OUTPATIENT
Start: 2019-07-17 | End: 2019-07-17

## 2019-05-15 NOTE — TELEPHONE ENCOUNTER
Called [facility], spoke to Leon about new patient referral to see  Dr Nathen Jacobo. Information given, Intake department will call back with apt.

## 2019-05-15 NOTE — PROGRESS NOTES
Prieto Marrero is a 77 y.o. female here for f/u breast cancer, with bilateral mastectomy. Her last Prolia injection was 9/6/19, she did not get her injection in march 2019. She has not taken her aromasin since January 2019 due to cost increase. She has an appointment with Dr Gabriela Rojo today for a biopsy, due to recent bloody discharge.   She also has an appointment with Dr Charmayne Rower in [place] tomorrow to have a colonoscopy,          [facility] Oncology Meds             exemestane (AROMASIN) 25 mg tablet take 1 tablet by mouth once daily

## 2019-05-15 NOTE — PROGRESS NOTES
Reason for Visit:   Sheba Paez is a 77 y.o. female who is seen for follow up of Breast and Colon Cancer    Treatment History:   Dx: Stage IIA T2N0M0 invasive ductal carcinoma of left breast underwent MRM of left breast in 09/2010 and histopathology was suggestive of poorly differentiated infiltrating ductal carcinoma, 5 cm size and 0/5 sentinel LNs involved, ER & OH positive Her 2 negative. She underwent oncotype Dx with recurrence score of 32 suggestive of high risk (21%) of distant recurrence. She then completed 4 cycles of adjuvant chemotherapy with docetaxel + cytoxan in 06/2011 and adjuvant radiation therapy until 01/2011. She was initially treated with Arimidex until 07/2012 but was dicontinued because she had osteoporosis and couldn't tolerate Fosamax. She was switched to Tamoxifen which was continued until 01/2015. Tamoxifen was discontinued due to findings of endometrial polyp and proliferative endothelium requiring D&C and eventually hysterectomy. She was then switched to Exemestane  Current Tx: Exemestane since 2/5/15 and has been on prophylaxis with Prolia every 6 months for osteoporosis. Dx:  Colon cancer s/p hemicolectomy in 1998. She was apparently started on adjuvant chemotherapy but couldn't tolerate and hence discontinued. Her last colonoscopy was on 12/11/2015 which showed hemorroids. Current Tx: Observation    Goal for both is cure--will see me every 6 months  History of Present Illness:   Ms Miryam Grossman is doing well. Drug cost have increased dramatically--aromasin now 300 per month--which isn't affordable for her--hasn't taken since Jan.  Also missed last Prolia injection due to mothers death from Uterine Cancer. Has family hx of colon cancer as well.       Past Medical History:   Diagnosis Date    Arthritis     Breast CA ([facility]     left    Cancer ([facility] 1998    colon cancer    Dyslipidemia (high LDL; low HDL) 10/12/2010    Essential hypertension, benign 10/12/2010    Family history of coronary artery disease 10/12/2010    FH: bowel obstruction 2015    Hypercholesteremia     Hypertension     Ill-defined condition     elevated cholesterol    Postmenopausal     S/P chemotherapy, time since greater than 12 weeks     S/P left hemicolectomy 10/12/2010      Past Surgical History:   Procedure Laterality Date    BREAST SURGERY PROCEDURE UNLISTED      double mastectomy    HX APPENDECTOMY      HX COLECTOMY      HX COLONOSCOPY      HX DILATION AND CURETTAGE      HX GYN  2000    uterine ablation    HX OOPHORECTOMY      part of left ovary removed    HX OTHER SURGICAL      port a cath    HX SALPINGO-OOPHORECTOMY        Social History     Tobacco Use    Smoking status: Former Smoker     Packs/day: 0.50     Years: 10.00     Pack years: 5.00     Last attempt to quit: 1980     Years since quittin.3    Smokeless tobacco: Never Used    Tobacco comment: quit 29 years ago   Substance Use Topics    Alcohol use: No      Family History   Problem Relation Age of Onset    Cancer Father 79        colon    Hypertension Mother     Arthritis-rheumatoid Mother     Elevated Lipids Mother     No Known Problems Sister     Elevated Lipids Brother     Hypertension Brother     Hypertension Sister     Elevated Lipids Sister     No Known Problems Brother     Heart Disease Brother      Current Outpatient Medications   Medication Sig    metFORMIN ER (GLUCOPHAGE XR) 500 mg tablet Take 1 Tab by mouth daily.  exemestane (AROMASIN) 25 mg tablet take 1 tablet by mouth once daily    CALCIUM CARBONATE/VITAMIN D3 (CALTRATE 600 + D PO) Take  by mouth.  aspirin (ASPIRIN) 325 mg tablet Take 325 mg by mouth daily.  Denosumab (PROLIA) 60 mg/mL injection 60 mg by SubCUTAneous route. Every 6 months   Indications: POST-MENOPAUSAL OSTEOPOROSIS    magnesium oxide (MAG-OX) 400 mg tablet Take 400 mg by mouth daily.     atorvastatin (LIPITOR) 40 mg tablet Take 40 mg by mouth daily.  amlodipine (NORVASC) 10 mg tablet Take 10 mg by mouth daily. Indications: HYPERTENSION    multivitamins-minerals-lutein (CENTRUM SILVER) Tab Take  by mouth daily. No current facility-administered medications for this visit. No Known Allergies     Review of Systems: A complete review of systems was obtained, negative except as described above. Physical Exam:   There were no vitals taken for this visit. ECOG PS: 0  General: No distress  Eyes: PERRLA, anicteric sclerae  HENT: Atraumatic, OP clear  Neck: Supple  Lymphatic: No cervical, supraclavicular, or inguinal adenopathy  Respiratory: CTAB, normal respiratory effort  CV: Normal rate, regular rhythm, no murmurs, no peripheral edema  GI: Soft, nontender, nondistended, no masses, no hepatomegaly, no splenomegaly  MS: Normal gait and station. Digits without clubbing or cyanosis. Skin: No rashes, ecchymoses, or petechiae. Normal temperature, turgor, and texture. Psych: Alert, oriented, appropriate affect, normal judgment/insight    Results:     Lab Results   Component Value Date/Time    WBC 4.5 11/14/2018 09:30 AM    HGB 13.7 11/14/2018 09:30 AM    HCT 43.2 11/14/2018 09:30 AM    PLATELET 600 31/36/1842 09:30 AM    MCV 87.8 11/14/2018 09:30 AM    ABS. NEUTROPHILS 2.1 11/14/2018 09:30 AM     Lab Results   Component Value Date/Time    Sodium 141 11/14/2018 09:30 AM    Potassium 4.0 11/14/2018 09:30 AM    Chloride 104 11/14/2018 09:30 AM    CO2 27 11/14/2018 09:30 AM    Glucose 124 (H) 11/14/2018 09:30 AM    BUN 16 11/14/2018 09:30 AM    Creatinine 0.83 11/14/2018 09:30 AM    GFR est AA >60 11/14/2018 09:30 AM    GFR est non-AA >60 11/14/2018 09:30 AM    Calcium 9.1 11/14/2018 09:30 AM    Creatinine (POC) 0.8 12/12/2016 10:02 AM     Lab Results   Component Value Date/Time    Bilirubin, total 0.4 11/14/2018 09:30 AM    ALT (SGPT) 26 11/14/2018 09:30 AM    AST (SGOT) 19 11/14/2018 09:30 AM    Alk.  phosphatase 78 11/14/2018 09:30 AM Protein, total 7.8 11/14/2018 09:30 AM    Albumin 4.3 11/14/2018 09:30 AM    Globulin 3.5 11/14/2018 09:30 AM       PET 11/2/2017  IMPRESSION: No Foci of Abnormal Hypermetabolism. There is a 6 mm nodule right  lower lobe which does not demonstrate increased metabolic activity. This was  present on a CT dated 6/21/2016.     Dexa 7/11/2017  IMPRESSION:  Osteopenia.     Bone mineral density of the total left hip has increased 0.3% since the priorexamination. Bone mineral density of the total right hip has increased 4.4% since the priorexamination. Bone mineral density of the total lumbar spine has increased 4.7% since theprior examination. Assessment:   1) Breast Cancer ER+HER2-  2) Colon Cancer  3) Osteoporosis/Osteopenia      Plan:   1. Stage IIA invasive ductal carcinoma of right breast, ER & ME positive Her 2 negative, high Oncotype:     - She was initially diagnosed in 09/2010 and completed her treatments as of 01/2011. - She was treated with Arimidex for 1 year and was switched to Tamoxifen for 2.5 years until 01/2015. She was switched to Aromasin since 02/2015 and has been on it since then along with Prolia every 6 months for osteoporosis. - Will switch to Faslodex in hopes of being affordable--needs for 10 years  - She underwent bilateral mastectomies and hence no role for screening mammograms.  - She will return to the clinic in 6 months      2. History of Colon cancer s/p hemicolectomy and was unable to tolerate adjuvant chemo in 1998:     - Her last colonscopy was on 12/11/2015 for rectal bleeding and was found to be secondary to hemorroids. .   - Advised her to continue her 5 yearly surveillance colonoscopies.     3. Osteoporosis/ Osteopenia:     - Last Dexa scan was done 07/2017and was suggestive of Osteopenia. She will due for a repeat in 07/2019.  - Her last Prolia was in 09/2018 and will due for next dose in 03/2019.   - Advised her to continue oral calcium + vitamin D supplements.     4. Family History of Colon Cancer, Breast Cancer, and Endometrial Cancer    - Refer to Genetics for eval of BRCA and HNPCC    Signed By: Vijaya Flynn MD

## 2019-06-04 ENCOUNTER — TELEPHONE (OUTPATIENT)
Dept: ONCOLOGY | Age: 69
End: 2019-06-04

## 2019-06-04 NOTE — TELEPHONE ENCOUNTER
Spoke with Angélica @ [employer]. Patient was notified on 5/17/2019 of apt scheduled to see Dr Jus Saldana on 7/25/2019 @9:00. Patient is aware of apt & address given.  [facility] AT [address] [phone]

## 2019-06-12 ENCOUNTER — TELEPHONE (OUTPATIENT)
Dept: CARDIOLOGY CLINIC | Age: 69
End: 2019-06-12

## 2019-06-12 NOTE — TELEPHONE ENCOUNTER
----- Message from Cecilia Vu MD sent at 6/7/2019 12:37 PM EDT -----  Regarding: carotid dopplers  Mild bilat plaque only, no concerns.   Thanks [facility]

## 2019-06-12 NOTE — TELEPHONE ENCOUNTER
Spoke with the patient. Verified patient with two patient identifiers. Results given and questions answered. Patient verbalized understanding.

## 2019-08-14 RX ORDER — LAMOTRIGINE 25 MG/1
500 TABLET ORAL ONCE
Status: CANCELLED | OUTPATIENT
Start: 2019-08-14

## 2019-08-26 ENCOUNTER — OFFICE VISIT (OUTPATIENT)
Dept: CARDIOLOGY CLINIC | Age: 69
End: 2019-08-26

## 2019-08-26 VITALS
HEART RATE: 70 BPM | DIASTOLIC BLOOD PRESSURE: 60 MMHG | OXYGEN SATURATION: 98 % | BODY MASS INDEX: 33.57 KG/M2 | RESPIRATION RATE: 16 BRPM | SYSTOLIC BLOOD PRESSURE: 130 MMHG | WEIGHT: 171 LBS | HEIGHT: 60 IN

## 2019-08-26 DIAGNOSIS — Z17.0 MALIGNANT NEOPLASM OF LEFT BREAST IN FEMALE, ESTROGEN RECEPTOR POSITIVE, UNSPECIFIED SITE OF BREAST (HCC): ICD-10-CM

## 2019-08-26 DIAGNOSIS — I65.23 BILATERAL CAROTID ARTERY STENOSIS: ICD-10-CM

## 2019-08-26 DIAGNOSIS — Z90.49 S/P LEFT HEMICOLECTOMY: ICD-10-CM

## 2019-08-26 DIAGNOSIS — R55 VASOVAGAL SYNCOPE: ICD-10-CM

## 2019-08-26 DIAGNOSIS — Z90.13 S/P BILATERAL MASTECTOMY: ICD-10-CM

## 2019-08-26 DIAGNOSIS — G45.9 TRANSIENT CEREBRAL ISCHEMIA, UNSPECIFIED TYPE: ICD-10-CM

## 2019-08-26 DIAGNOSIS — C50.912 MALIGNANT NEOPLASM OF LEFT BREAST IN FEMALE, ESTROGEN RECEPTOR POSITIVE, UNSPECIFIED SITE OF BREAST (HCC): ICD-10-CM

## 2019-08-26 DIAGNOSIS — I10 ESSENTIAL HYPERTENSION, BENIGN: Primary | ICD-10-CM

## 2019-08-26 DIAGNOSIS — E78.5 DYSLIPIDEMIA (HIGH LDL; LOW HDL): ICD-10-CM

## 2019-08-26 RX ORDER — ASPIRIN 81 MG/1
81 TABLET ORAL DAILY
Qty: 90 TAB | Refills: 1
Start: 2019-08-26

## 2019-08-26 RX ORDER — EZETIMIBE 10 MG/1
10 TABLET ORAL DAILY
Qty: 90 TAB | Refills: 1 | Status: SHIPPED | OUTPATIENT
Start: 2019-08-26 | End: 2021-02-22

## 2019-08-26 NOTE — PROGRESS NOTES
William Jeffries is a 77 y.o. female is here for routine f/u. Hx hypertension, dyslipidemia, colon ca (s/p hemicolectomy), breast ca--s/p bilat mastectomies, XRT/Chemo; No known cardiac hx. Episode of syncope after spell of abdominal pain, N/V, diarrhea, cramping and got up from commode--passed out. Initial ED w/u neg--told likely vasovagal. One episode 30-40 yrs ago. Seen by Dr. Roman Ruiz testing: ECHO 10/6/16--normal LV size/walls, LVEF 65-70, mild MR, RSVP 42. Carotid dopplers 36/4/73--4-75% DIONY, 93-30% LICA, bilat antegrade vertebrals. Holter monitor 10/16 with NSR, PAC's, one 3 beat PAT run. Had some issues with LE edema, improved after reduced Na. Over last summer had confusion/weakness--prob TIA, at [facility] with neg CV w/u.  Apparently had Echo as part of w/u, but results not currently available.  No CV sx or complaints currently.  Continues to see PCP and Oncology. Carotid dopplers 6/7/19 with mild bilat ICA plaque. Recent labs 5/23/19 with chol 206, , HDL 57, TG 97, HbA1c 5.9. On atorvastatin 40mg. The patient denies chest pain/ shortness of breath, orthopnea, PND, LE edema, palpitations, syncope, presyncope or fatigue.        Patient Active Problem List    Diagnosis Date Noted    Osteopenia due to cancer therapy 06/15/2017    Vasovagal syncope 10/11/2016    Left kidney mass 06/08/2016    Rash of neck 01/12/2016    Use of exemestane (Aromasin) 09/15/2015    S/P left hemicolectomy 11/11/2010    S/P bilateral mastectomy 10/18/2010    Essential hypertension, benign 10/12/2010    Dyslipidemia (high LDL; low HDL) 10/12/2010    S/P left hemicolectomy 10/12/2010    Family history of coronary artery disease 10/12/2010    Malignant neoplasm of female breast ([facility] 09/02/2010      Suze Wne MD  Past Medical History:   Diagnosis Date    Arthritis     Breast CA ([facility]     left    Cancer ([facility] 1998    colon cancer    Dyslipidemia (high LDL; low HDL) 10/12/2010    Essential hypertension, benign 10/12/2010    Family history of coronary artery disease 10/12/2010    FH: bowel obstruction 2015    Hypercholesteremia     Hypertension     Ill-defined condition     elevated cholesterol    Postmenopausal     S/P chemotherapy, time since greater than 12 weeks     S/P left hemicolectomy 10/12/2010      Past Surgical History:   Procedure Laterality Date    BREAST SURGERY PROCEDURE UNLISTED      double mastectomy    HX APPENDECTOMY      HX COLECTOMY      HX COLONOSCOPY      HX DILATION AND CURETTAGE      HX GYN  2000    uterine ablation    HX OOPHORECTOMY      part of left ovary removed    HX OTHER SURGICAL      port a cath    HX SALPINGO-OOPHORECTOMY       No Known Allergies   Family History   Problem Relation Age of Onset    Cancer Father 79        colon    Hypertension Mother    [employer] Arthritis-rheumatoid Mother     Elevated Lipids Mother     No Known Problems Sister     Elevated Lipids Brother     Hypertension Brother     Hypertension Sister     Elevated Lipids Sister     No Known Problems Brother     Heart Disease Brother       Social History     Socioeconomic History    Marital status:      Spouse name: Not on file    Number of children: Not on file    Years of education: Not on file    Highest education level: Not on file   Occupational History    Not on file   Social Needs    Financial resource strain: Not on file    Food insecurity:     Worry: Not on file     Inability: Not on file    Transportation needs:     Medical: Not on file     Non-medical: Not on file   Tobacco Use    Smoking status: Former Smoker     Packs/day: 0.50     Years: 10.00     Pack years: 5.00     Last attempt to quit: 1980     Years since quittin.6    Smokeless tobacco: Never Used    Tobacco comment: quit 29 years ago   Substance and Sexual Activity    Alcohol use: No    Drug use: No    Sexual activity: Not on file     Comment: menses age 13, menopause 44 age, first birth 32, Pauline Mora   Lifestyle    Physical activity:     Days per week: Not on file     Minutes per session: Not on file    Stress: Not on file   Relationships    Social connections:     Talks on phone: Not on file     Gets together: Not on file     Attends Nondenominational service: Not on file     Active member of club or organization: Not on file     Attends meetings of clubs or organizations: Not on file     Relationship status: Not on file    Intimate partner violence:     Fear of current or ex partner: Not on file     Emotionally abused: Not on file     Physically abused: Not on file     Forced sexual activity: Not on file   Other Topics Concern    Not on file   Social History Narrative    Not on file      Current Outpatient Medications   Medication Sig    ezetimibe (ZETIA) 10 mg tablet Take 1 Tab by mouth daily.  aspirin delayed-release 81 mg tablet Take 1 Tab by mouth daily.  fulvestrant (FASLODEX IM) 500 mg by IntraMUSCular route every twenty-eight (28) days.  metFORMIN ER (GLUCOPHAGE XR) 500 mg tablet Take 1 Tab by mouth daily.  CALCIUM CARBONATE/VITAMIN D3 (CALTRATE 600 + D PO) Take  by mouth.  Denosumab (PROLIA) 60 mg/mL injection 60 mg by SubCUTAneous route. Every 6 months   Indications: POST-MENOPAUSAL OSTEOPOROSIS    magnesium oxide (MAG-OX) 400 mg tablet Take 400 mg by mouth daily.  atorvastatin (LIPITOR) 40 mg tablet Take 40 mg by mouth daily.  amlodipine (NORVASC) 10 mg tablet Take 10 mg by mouth daily. Indications: HYPERTENSION    multivitamins-minerals-lutein (CENTRUM SILVER) Tab Take  by mouth daily. No current facility-administered medications for this visit. Review of Symptoms:    CONST  No weight change. No fever, chills, sweats    ENT No visual changes, URI sx, sore throat    CV  See HPI   RESP  No cough, or sputum, wheezing. Also see HPI   GI  No abdominal pain or change in bowel habits. No heartburn or dysphagia.    No melena or rectal bleeding.   No dysuria, urgency, frequency, hematuria   MSKEL  No joint pain, swelling. No muscle pain. SKIN  No rash or lesions. NEURO  No headache, syncope, or seizure. No weakness, loss of sensation, or paresthesias. PSYCH  No low mood or depression  No anxiety. HE/LYMPH  No easy bruising, abnormal bleeding, or enlarged glands.         Physical ExamPhysical Exam:    Visit Vitals  /60 (BP 1 Location: Right arm, BP Patient Position: Sitting)   Pulse 70   Resp 16   Ht 5' (1.524 m)   Wt 171 lb (77.6 kg)   SpO2 98%   BMI 33.40 kg/m²     Gen: NAD  HEENT:  PERRL, throat clear  Neck: no adenopathy, no thyromegaly, no JVD   Heart:  Regular,Nl S1S2,  no murmur, gallop or rub.   Lungs:  clear  Abdomen:   Soft, non-tender, bowel sounds are active.   Extremities:  No edema  Pulse: symmetric  Neuro: A&O times 3, No focal neuro deficits    Cardiographics    ECG: NSR, PVC, poss LAE    Labs:   Lab Results   Component Value Date/Time    Sodium 142 05/15/2019 09:23 AM    Sodium 141 11/14/2018 09:30 AM    Sodium 143 09/06/2018 09:30 AM    Sodium 147 (H) 02/22/2018 09:20 AM    Sodium 144 10/19/2017 09:27 AM    Potassium 3.5 05/15/2019 09:23 AM    Potassium 4.0 11/14/2018 09:30 AM    Potassium 3.5 09/06/2018 09:30 AM    Potassium 4.1 02/22/2018 09:20 AM    Potassium 3.7 10/19/2017 09:27 AM    Chloride 103 05/15/2019 09:23 AM    Chloride 104 11/14/2018 09:30 AM    Chloride 103 09/06/2018 09:30 AM    Chloride 105 02/22/2018 09:20 AM    Chloride 104 10/19/2017 09:27 AM    CO2 27 05/15/2019 09:23 AM    CO2 27 11/14/2018 09:30 AM    CO2 28 09/06/2018 09:30 AM    CO2 30 02/22/2018 09:20 AM    CO2 31 10/19/2017 09:27 AM    Anion gap 12 05/15/2019 09:23 AM    Anion gap 10 11/14/2018 09:30 AM    Anion gap 12 09/06/2018 09:30 AM    Anion gap 12 02/22/2018 09:20 AM    Anion gap 9 10/19/2017 09:27 AM    Glucose 107 (H) 05/15/2019 09:23 AM    Glucose 124 (H) 11/14/2018 09:30 AM    Glucose 144 (H) 09/06/2018 09:30 AM    Glucose 145 (H) 02/22/2018 09:20 AM    Glucose 128 (H) 10/19/2017 09:27 AM    BUN 17 05/15/2019 09:23 AM    BUN 16 11/14/2018 09:30 AM    BUN 12 09/06/2018 09:30 AM    BUN 17 02/22/2018 09:20 AM    BUN 18 10/19/2017 09:27 AM    Creatinine 0.83 05/15/2019 09:23 AM    Creatinine 0.83 11/14/2018 09:30 AM    Creatinine 0.96 09/06/2018 09:30 AM    Creatinine 0.79 02/22/2018 09:20 AM    Creatinine 0.85 10/19/2017 09:27 AM    BUN/Creatinine ratio 20 05/15/2019 09:23 AM    BUN/Creatinine ratio 19 11/14/2018 09:30 AM    BUN/Creatinine ratio 13 09/06/2018 09:30 AM    BUN/Creatinine ratio 22 (H) 02/22/2018 09:20 AM    BUN/Creatinine ratio 21 (H) 10/19/2017 09:27 AM    GFR est AA >60 05/15/2019 09:23 AM    GFR est AA >60 11/14/2018 09:30 AM    GFR est AA >60 09/06/2018 09:30 AM    GFR est AA >60 02/22/2018 09:20 AM    GFR est AA >60 10/19/2017 09:27 AM    GFR est non-AA >60 05/15/2019 09:23 AM    GFR est non-AA >60 11/14/2018 09:30 AM    GFR est non-AA 58 (L) 09/06/2018 09:30 AM    GFR est non-AA >60 02/22/2018 09:20 AM    GFR est non-AA >60 10/19/2017 09:27 AM    Calcium 9.6 05/15/2019 09:23 AM    Calcium 9.1 11/14/2018 09:30 AM    Calcium 9.7 09/06/2018 09:30 AM    Calcium 9.2 02/22/2018 09:20 AM    Calcium 9.4 10/19/2017 09:27 AM    Bilirubin, total 0.5 05/22/2019 09:50 AM    Bilirubin, total 0.4 11/14/2018 09:30 AM    Bilirubin, total 0.5 10/19/2017 09:27 AM    Bilirubin, total 0.5 06/15/2017 11:15 AM    Bilirubin, total 0.4 10/15/2010 02:00 PM    AST (SGOT) 21 05/22/2019 09:50 AM    AST (SGOT) 19 11/14/2018 09:30 AM    AST (SGOT) 18 10/19/2017 09:27 AM    AST (SGOT) 19 06/15/2017 11:15 AM    AST (SGOT) 18 10/15/2010 02:00 PM    Alk. phosphatase 104 05/22/2019 09:50 AM    Alk. phosphatase 78 11/14/2018 09:30 AM    Alk. phosphatase 80 10/19/2017 09:27 AM    Alk. phosphatase 77 06/15/2017 11:15 AM    Alk.  phosphatase 142 (H) 10/15/2010 02:00 PM    Protein, total 7.5 05/22/2019 09:50 AM    Protein, total 7.8 11/14/2018 09:30 AM    Protein, total 8.5 (H) 10/19/2017 09:27 AM    Protein, total 8.1 06/15/2017 11:15 AM    Protein, total 7.5 10/15/2010 02:00 PM    Albumin 4.1 05/22/2019 09:50 AM    Albumin 4.3 05/15/2019 09:23 AM    Albumin 4.3 11/14/2018 09:30 AM    Albumin 4.2 09/06/2018 09:30 AM    Albumin 4.6 10/19/2017 09:27 AM    Globulin 3.4 05/22/2019 09:50 AM    Globulin 3.5 11/14/2018 09:30 AM    Globulin 3.9 10/19/2017 09:27 AM    Globulin 3.8 06/15/2017 11:15 AM    Globulin 3.5 10/15/2010 02:00 PM    A-G Ratio 1.2 05/22/2019 09:50 AM    A-G Ratio 1.2 11/14/2018 09:30 AM    A-G Ratio 1.2 10/19/2017 09:27 AM    A-G Ratio 1.1 06/15/2017 11:15 AM    A-G Ratio 1.1 10/15/2010 02:00 PM    ALT (SGPT) 34 05/22/2019 09:50 AM    ALT (SGPT) 26 11/14/2018 09:30 AM    ALT (SGPT) 35 10/19/2017 09:27 AM    ALT (SGPT) 30 06/15/2017 11:15 AM    ALT (SGPT) 33 10/15/2010 02:00 PM     No results found for: CPK, CPKX, CPX  No results found for: CHOL, CHOLX, CHLST, CHOLV, 101046, HDL, LDL, LDLC, DLDLP, TGLX, TRIGL, TRIGP, CHHD, CHHDX  No results found for this or any previous visit. Assessment:         Patient Active Problem List    Diagnosis Date Noted    Osteopenia due to cancer therapy 06/15/2017    Vasovagal syncope 10/11/2016    Left kidney mass 06/08/2016    Rash of neck 01/12/2016    Use of exemestane (Aromasin) 09/15/2015    S/P left hemicolectomy 11/11/2010    S/P bilateral mastectomy 10/18/2010    Essential hypertension, benign 10/12/2010    Dyslipidemia (high LDL; low HDL) 10/12/2010    S/P left hemicolectomy 10/12/2010    Family history of coronary artery disease 10/12/2010    Malignant neoplasm of female breast ([facility] [address] 09/02/2010     Hx hypertension, dyslipidemia, colon ca (s/p hemicolectomy), breast ca--s/p bilat mastectomies, XRT/Chemo; No known cardiac hx. Episode of syncope after spell of abdominal pain, N/V, diarrhea, cramping and got up from commode--passed out.  Initial ED w/u neg--told likely vasovagal. One episode 30-40 yrs ago. Seen by Dr. Ole Hines testing: ECHO 10/6/16--normal LV size/walls, LVEF 65-70, mild MR, RSVP 42. Carotid dopplers 56/0/34--1-53% DIONY, 09-09% LICA, bilat antegrade vertebrals. Holter monitor 10/16 with NSR, PAC's, one 3 beat PAT run. Had some issues with LE edema, improved after reduced Na. Over last summer had confusion/weakness--prob TIA, at [facility] with neg CV w/u.  Apparently had Echo as part of w/u, but results not currently available.  No CV sx or complaints currently.  Continues to see PCP and Oncology. Carotid dopplers 6/7/19 with mild bilat ICA plaque. Recent labs 5/23/19 with chol 206, , HDL 57, TG 97, HbA1c 5.9. On atorvastatin 40mg. Plan:     Reduce ASA to 81mg every day (taking 325)  Continue atorvastatin 40, ADD zetia 10mg every day  Dietary rx  Recheck labs with PCP as planned. Lipids and labs followed by PCP. Continue current care and f/u in 6 months.     Barry Alvarez MD

## 2019-08-26 NOTE — LETTER
8/26/19 Patient: Kinza Estrada YOB: 1950 Date of Visit: 8/26/2019 Venu Portillo MD 
[address] [facility] [id number] VIA Facsimile: [phone] Dear Venu Portillo MD, Thank you for referring Ms. Caty Martinez to [address] [place] [address] [facility] for evaluation. My notes for this consultation are attached. If you have questions, please do not hesitate to call me. I look forward to following your patient along with you.  
 
 
Sincerely, 
 
Barry Alvarez MD

## 2019-08-26 NOTE — PROGRESS NOTES
1. Have you been to the ER, urgent care clinic since your last visit? Hospitalized since your last visit? No    2. Have you seen or consulted any other health care providers outside of the [address] [place] [address] since your last visit? Include any pap smears or colon screening. No  Chief Complaint   Patient presents with    Follow-up    Hypertension     PATIENT MEDICATION REVIEWED AND APPROVED BY DR Anatoly Ramachandran. MEDICATIONS THAT WERE REMOVED  FROM THIS  VISIT WERE APPROVED BY DR Anatoly Ramachandran. PATIENT ID VERIFIED WITH 2 PATIENT IDENTIFIERS. Patient has no cardiac complaints. She questions if need to continue with  mg daily. Had appt with PCP today.

## 2019-09-04 RX ORDER — LAMOTRIGINE 25 MG/1
500 TABLET ORAL ONCE
Status: CANCELLED | OUTPATIENT
Start: 2019-09-11 | End: 2019-09-11

## 2019-09-04 RX ORDER — LAMOTRIGINE 25 MG/1
500 TABLET ORAL ONCE
Status: CANCELLED | OUTPATIENT
Start: 2019-10-09 | End: 2019-10-09

## 2019-09-04 RX ORDER — LAMOTRIGINE 25 MG/1
500 TABLET ORAL ONCE
Status: CANCELLED | OUTPATIENT
Start: 2019-11-21 | End: 2019-11-21

## 2019-09-04 RX ORDER — LAMOTRIGINE 25 MG/1
500 TABLET ORAL ONCE
Status: CANCELLED | OUTPATIENT
Start: 2019-12-19 | End: 2019-12-19

## 2019-09-12 ENCOUNTER — TELEPHONE (OUTPATIENT)
Dept: CARDIOLOGY CLINIC | Age: 69
End: 2019-09-12

## 2019-09-12 NOTE — TELEPHONE ENCOUNTER
She was prescibed the medication Zetia by Dr. Daniela Shaikh and when she went to pick the medication up her cost is $188.00 and she cant afford that. Was wondering if there is a similar medication she can take that is not as expensive.   Please call [phone]

## 2019-10-10 NOTE — TELEPHONE ENCOUNTER
Per Dr. Daniela Shaikh:          Really no direct substitute for generic zetia if too expensive. Ligia Ria continue atorvastatin but try doubling dose and see if tolerates (if ok can send in rx). [address] with the patient. Verified patient with two patient identifiers. Dr. Daniela Shaikh order  given and questions answered. Patient verbalized understanding. Pt will double the atorvastatin dose for now and call back when ready for a new rx for 80 mg.

## 2019-11-15 PROBLEM — M81.0 POSTMENOPAUSAL OSTEOPOROSIS: Status: ACTIVE | Noted: 2019-11-15

## 2019-11-21 ENCOUNTER — OFFICE VISIT (OUTPATIENT)
Dept: ONCOLOGY | Age: 69
End: 2019-11-21

## 2019-11-21 VITALS
SYSTOLIC BLOOD PRESSURE: 135 MMHG | BODY MASS INDEX: 33.57 KG/M2 | HEART RATE: 87 BPM | WEIGHT: 171 LBS | HEIGHT: 60 IN | RESPIRATION RATE: 18 BRPM | OXYGEN SATURATION: 100 % | DIASTOLIC BLOOD PRESSURE: 60 MMHG | TEMPERATURE: 97 F

## 2019-11-21 DIAGNOSIS — C50.912 MALIGNANT NEOPLASM OF LEFT BREAST IN FEMALE, ESTROGEN RECEPTOR POSITIVE, UNSPECIFIED SITE OF BREAST (HCC): Primary | ICD-10-CM

## 2019-11-21 DIAGNOSIS — Z17.0 MALIGNANT NEOPLASM OF LEFT BREAST IN FEMALE, ESTROGEN RECEPTOR POSITIVE, UNSPECIFIED SITE OF BREAST (HCC): Primary | ICD-10-CM

## 2019-11-21 NOTE — PROGRESS NOTES
Reason for Visit:   Mark Patel is a 68 y.o. female who is seen for follow up of Breast and Colon Cancer     Treatment History:   Dx: Stage IIA T2N0M0 invasive ductal carcinoma of left breast underwent MRM of left breast in 09/2010 and histopathology was suggestive of poorly differentiated infiltrating ductal carcinoma, 5 cm size and 0/5 sentinel LNs involved, ER & ND positive Her 2 negative. She underwent oncotype Dx with recurrence score of 32 suggestive of high risk (21%) of distant recurrence. She then completed 4 cycles of adjuvant chemotherapy with docetaxel + cytoxan in 06/2011 and adjuvant radiation therapy until 01/2011. She was initially treated with Arimidex until 07/2012 but was dicontinued because she had osteoporosis and couldn't tolerate Fosamax. She was switched to Tamoxifen which was continued until 01/2015. Tamoxifen was discontinued due to findings of endometrial polyp and proliferative endothelium requiring D&C and eventually hysterectomy. She was then switched to Exemestane (Feb 2015--April 2019)--stopped due to prohibitive cost.   Current Tx: Fulvestrant since May 2019 and has been on prophylaxis with Prolia every 6 months for osteoporosis.     Dx:  Colon cancer s/p hemicolectomy in 1998. She was apparently started on adjuvant chemotherapy but couldn't tolerate and hence discontinued. Her last colonoscopy was on 12/11/2015 which showed hemorroids. Current Tx: Observation     Goal for both is cure--will see me every 6 months    History of Present Illness:   Doing well overall. Some soreness at the injection sites. No problems with new aches/pains or lumps/bumps. Hot flashes very manageable.       Past Medical History:   Diagnosis Date    Arthritis     Breast CA ([address]     left    Cancer ([facility] [address] 1998    colon cancer    Dyslipidemia (high LDL; low HDL) 10/12/2010    Essential hypertension, benign 10/12/2010    Family history of coronary artery disease 10/12/2010    FH: bowel obstruction 2015    Hypercholesteremia     Hypertension     Ill-defined condition     elevated cholesterol    Postmenopausal     S/P chemotherapy, time since greater than 12 weeks     S/P left hemicolectomy 10/12/2010      Past Surgical History:   Procedure Laterality Date    BREAST SURGERY PROCEDURE UNLISTED      double mastectomy    HX APPENDECTOMY      HX COLECTOMY      HX COLONOSCOPY      HX DILATION AND CURETTAGE      HX GYN  2000    uterine ablation    HX OOPHORECTOMY      part of left ovary removed    HX OTHER SURGICAL      port a cath    HX SALPINGO-OOPHORECTOMY        Social History     Tobacco Use    Smoking status: Former Smoker     Packs/day: 0.50     Years: 10.00     Pack years: 5.00     Last attempt to quit: 1980     Years since quittin.9    Smokeless tobacco: Never Used    Tobacco comment: quit 29 years ago   Substance Use Topics    Alcohol use: No      Family History   Problem Relation Age of Onset    Cancer Father 79        colon    Hypertension Mother     Arthritis-rheumatoid Mother     Elevated Lipids Mother     No Known Problems Sister     Elevated Lipids Brother     Hypertension Brother     Hypertension Sister     Elevated Lipids Sister     No Known Problems Brother     Heart Disease Brother      Current Outpatient Medications   Medication Sig    ezetimibe (ZETIA) 10 mg tablet Take 1 Tab by mouth daily.  aspirin delayed-release 81 mg tablet Take 1 Tab by mouth daily.  fulvestrant (FASLODEX IM) 500 mg by IntraMUSCular route every twenty-eight (28) days.  metFORMIN ER (GLUCOPHAGE XR) 500 mg tablet Take 1 Tab by mouth daily.  CALCIUM CARBONATE/VITAMIN D3 (CALTRATE 600 + D PO) Take  by mouth.  Denosumab (PROLIA) 60 mg/mL injection 60 mg by SubCUTAneous route. Every 6 months   Indications: POST-MENOPAUSAL OSTEOPOROSIS    magnesium oxide (MAG-OX) 400 mg tablet Take 400 mg by mouth daily.     atorvastatin (LIPITOR) 40 mg tablet Take 40 mg by mouth daily.  amlodipine (NORVASC) 10 mg tablet Take 10 mg by mouth daily. Indications: HYPERTENSION    multivitamins-minerals-lutein (CENTRUM SILVER) Tab Take  by mouth daily. No current facility-administered medications for this visit. Facility-Administered Medications Ordered in Other Visits   Medication Dose Route Frequency    denosumab (PROLIA) injection 60 mg  60 mg SubCUTAneous ONCE      No Known Allergies     Review of Systems: A complete review of systems was obtained, negative except as described above. Physical Exam:   There were no vitals taken for this visit. ECOG PS: 0  General: No distress  Eyes: PERRLA, anicteric sclerae  HENT: Atraumatic, OP clear  Neck: Supple  Lymphatic: No cervical, supraclavicular, or inguinal adenopathy  Respiratory: CTAB, normal respiratory effort  CV: Normal rate, regular rhythm, no murmurs, no peripheral edema  GI: Soft, nontender, nondistended, no masses, no hepatomegaly, no splenomegaly  MS: Normal gait and station. Digits without clubbing or cyanosis. Skin: No rashes, ecchymoses, or petechiae. Normal temperature, turgor, and texture. Psych: Alert, oriented, appropriate affect, normal judgment/insight    Results:     Lab Results   Component Value Date/Time    WBC 4.5 11/14/2018 09:30 AM    HGB 13.7 11/14/2018 09:30 AM    HCT 43.2 11/14/2018 09:30 AM    PLATELET 997 30/93/6558 09:30 AM    MCV 87.8 11/14/2018 09:30 AM    ABS.  NEUTROPHILS 2.1 11/14/2018 09:30 AM     Lab Results   Component Value Date/Time    Sodium 142 05/15/2019 09:23 AM    Potassium 3.5 05/15/2019 09:23 AM    Chloride 103 05/15/2019 09:23 AM    CO2 27 05/15/2019 09:23 AM    Glucose 107 (H) 05/15/2019 09:23 AM    BUN 17 05/15/2019 09:23 AM    Creatinine 0.83 05/15/2019 09:23 AM    GFR est AA >60 05/15/2019 09:23 AM    GFR est non-AA >60 05/15/2019 09:23 AM    Calcium 9.6 05/15/2019 09:23 AM    Creatinine (POC) 0.8 12/12/2016 10:02 AM     Lab Results   Component Value Date/Time    Bilirubin, total 0.5 05/22/2019 09:50 AM    ALT (SGPT) 34 05/22/2019 09:50 AM    AST (SGOT) 21 05/22/2019 09:50 AM    Alk. phosphatase 104 05/22/2019 09:50 AM    Protein, total 7.5 05/22/2019 09:50 AM    Albumin 4.1 05/22/2019 09:50 AM    Globulin 3.4 05/22/2019 09:50 AM       PET 11/2/2017  IMPRESSION: No Foci of Abnormal Hypermetabolism. There is a 6 mm nodule right  lower lobe which does not demonstrate increased metabolic activity. This was  present on a CT dated 6/21/2016.     Dexa 7/11/2017  IMPRESSION:  Osteopenia.     Bone mineral density of the total left hip has increased 0.3% since the priorexamination. Bone mineral density of the total right hip has increased 4.4% since the priorexamination. Bone mineral density of the total lumbar spine has increased 4.7% since theprior examination.     Assessment:   1) Breast Cancer ER+HER2-  2) Colon Cancer  3) Osteoporosis/Osteopenia        Plan:   1. Stage IIA invasive ductal carcinoma of right breast, ER & HI positive Her 2 negative, high Oncotype:     - She was initially diagnosed in 09/2010 and completed her treatments as of 01/2011. - She was treated with Arimidex for 1 year and was switched to Tamoxifen for 2.5 years until 01/2015. She was switched to Aromasin since 02/2015 and has been on it since then along with Prolia every 6 months for osteoporosis. - Will switch to Faslodex in hopes of being affordable--needs for 10 years  - She underwent bilateral mastectomies and hence no role for screening mammograms.  - She will return to the clinic in 6 months      2. History of Colon cancer s/p hemicolectomy and was unable to tolerate adjuvant chemo in 1998:     - Her last colonscopy was on 12/11/2015 for rectal bleeding and was found to be secondary to hemorroids. .   - Advised her to continue her 5 yearly surveillance colonoscopies.     3. Osteoporosis/ Osteopenia:     - Last Dexa scan was done 07/2017and was suggestive of Osteopenia.  She will due for a repeat in 07/2019.  - Her last Prolia was in 05/2019.   - Advised her to continue oral calcium + vitamin D supplements.     4.  Family History of Colon Cancer, Breast Cancer, and Endometrial Cancer     - Referred to Genetics for eval of BRCA and HNPCC,  Was negative for BRCA 1/2, getting results from [address]         Signed By: Gonsalo Childers MD

## 2019-11-21 NOTE — PROGRESS NOTES
Pt is a 68 yr old pt here for routine follow up, she is getting Faslodex and Prolia today, she does report some occasional hip pain, none now. Visit Vitals  /60   Pulse 87   Temp 97 °F (36.1 °C)   Resp 18   Ht 5' (1.524 m)   Wt 171 lb (77.6 kg)   SpO2 100%   BMI 33.40 kg/m²       Pain Scale: 0 - No pain/10  Pain Location:     1. Have you been to the ER, urgent care clinic since your last visit? Hospitalized since your last visit? no    2. Have you seen or consulted any other health care providers outside of the [address] [place] [address] since your last visit? Include any pap smears or colon screening. No    Health Maintenance reviewed not had flu shot yet, will go to PCP or Drug Store.

## 2020-01-06 RX ORDER — LAMOTRIGINE 25 MG/1
500 TABLET ORAL ONCE
Status: CANCELLED | OUTPATIENT
Start: 2020-01-16 | End: 2020-01-16

## 2020-01-06 RX ORDER — LAMOTRIGINE 25 MG/1
500 TABLET ORAL ONCE
Status: CANCELLED | OUTPATIENT
Start: 2020-02-13 | End: 2020-02-13

## 2020-01-06 RX ORDER — LAMOTRIGINE 25 MG/1
500 TABLET ORAL ONCE
Status: CANCELLED | OUTPATIENT
Start: 2020-03-13 | End: 2020-03-13

## 2020-03-31 RX ORDER — LAMOTRIGINE 25 MG/1
500 TABLET ORAL ONCE
Status: CANCELLED | OUTPATIENT
Start: 2020-07-06 | End: 2020-07-06

## 2020-03-31 RX ORDER — LAMOTRIGINE 25 MG/1
500 TABLET ORAL ONCE
Status: CANCELLED | OUTPATIENT
Start: 2020-06-05 | End: 2020-06-05

## 2020-03-31 RX ORDER — LAMOTRIGINE 25 MG/1
500 TABLET ORAL ONCE
Status: CANCELLED | OUTPATIENT
Start: 2020-04-10 | End: 2020-04-10

## 2020-03-31 RX ORDER — LAMOTRIGINE 25 MG/1
500 TABLET ORAL ONCE
Status: CANCELLED | OUTPATIENT
Start: 2020-05-08 | End: 2020-05-08

## 2020-03-31 RX ORDER — LAMOTRIGINE 25 MG/1
500 TABLET ORAL ONCE
Status: CANCELLED | OUTPATIENT
Start: 2020-08-03 | End: 2020-08-03

## 2020-05-21 ENCOUNTER — OFFICE VISIT (OUTPATIENT)
Dept: ONCOLOGY | Age: 70
End: 2020-05-21

## 2020-05-21 VITALS
SYSTOLIC BLOOD PRESSURE: 142 MMHG | RESPIRATION RATE: 18 BRPM | OXYGEN SATURATION: 99 % | HEART RATE: 75 BPM | DIASTOLIC BLOOD PRESSURE: 78 MMHG | HEIGHT: 61 IN | BODY MASS INDEX: 32.06 KG/M2 | WEIGHT: 169.8 LBS | TEMPERATURE: 97.2 F

## 2020-05-21 DIAGNOSIS — M81.0 OSTEOPOROSIS, UNSPECIFIED OSTEOPOROSIS TYPE, UNSPECIFIED PATHOLOGICAL FRACTURE PRESENCE: Primary | ICD-10-CM

## 2020-05-21 NOTE — PROGRESS NOTES
Luís Santiago is a 77 y.o. female here for f/u breast cancer, with bilateral mastectomy. Patient states she has had 2 faslodex injections, after the first she had possibly cellulitis in area of injection, after the second faslodex injection patient experienced what she feels like were UTI related. Last;  Faslodex next due 6/5/2020  Prolia Due today 5/21/2020 in [facility] stopped 1/2019    Chemotherapy Flowsheet 5/8/2020   Cycle C 12 D 1    Date 5/8/2020   Drug / Regimen Fasoldex   Dosage 500 mg    Time Up given    Notes 250 mg each divided dose           Key Oncology Meds             fulvestrant (FASLODEX IM) 500 mg by IntraMUSCular route every twenty-eight (28) days. Key Pain Meds     The patient is on no pain meds.

## 2020-05-21 NOTE — PROGRESS NOTES
Reason for Visit:   Amanda Lewis a 68 y. o. female who is seen for follow up of Breast and Colon Cancer     Treatment History:   Dx:  Stage IIA T2N0M0 invasive ductal carcinoma of left breast underwent MRM of left breast in 09/2010 and histopathology was suggestive of poorly differentiated infiltrating ductal carcinoma, 5 cm size and 0/5 sentinel LNs involved, ER & OH positive Her 2 negative. She underwent oncotype Dx with recurrence score of 32 suggestive of high risk (21%) of distant recurrence. She then completed 4 cycles of adjuvant chemotherapy with docetaxel + cytoxan in 06/2011 and adjuvant radiation therapy until 01/2011. She was initially treated with Arimidex until 07/2012 but was dicontinued because she had osteoporosis and couldn't tolerate Fosamax. She was switched to Tamoxifen which was continued until 01/2015. Tamoxifen was discontinued due to findings of endometrial polyp and proliferative endothelium requiring D&C and eventually hysterectomy. She was then switched to Exemestane (Feb 2015--April 2019)--stopped due to prohibitive cost.   Current Tx: Fulvestrant since May 2019 and has been on prophylaxis with Prolia every 6 months for osteoporosis    Dx:  Colon cancer s/p hemicolectomy in 1998. She was apparently started on adjuvant chemotherapy but couldn't tolerate and hence discontinued. Her last colonoscopy was on 12/11/2015 which showed hemorroids. Current Tx: Observation     Goal for both is cure--will see me every 6 months  History of Present Illness:   Doing ok, had some injection site erythema and itching last dose--some hot flashes but not interfering with activities, possible UTI, has vaginal dryness but no pain. Approaching 10 years adjuvant hormonal treatment--wants to continue another 6 months to complete.       Past Medical History:   Diagnosis Date    Arthritis     [facility] ([facility] [address]     left    Cancer ([facility] [address] 1998    colon cancer    Dyslipidemia (high LDL; low HDL) 10/12/2010    Essential hypertension, benign 10/12/2010    Family history of coronary artery disease 10/12/2010    FH: bowel obstruction 2015    Hypercholesteremia     Hypertension     Ill-defined condition     elevated cholesterol    Postmenopausal     S/P chemotherapy, time since greater than 12 weeks     S/P left hemicolectomy 10/12/2010      Past Surgical History:   Procedure Laterality Date    BREAST SURGERY PROCEDURE UNLISTED      double mastectomy    HX APPENDECTOMY      HX COLECTOMY      HX COLONOSCOPY      HX DILATION AND CURETTAGE      HX GYN  2000    uterine ablation    HX OOPHORECTOMY      part of left ovary removed    HX OTHER SURGICAL  2011    port a cath    HX SALPINGO-OOPHORECTOMY        Social History     Tobacco Use    Smoking status: Former Smoker     Packs/day: 0.50     Years: 10.00     Pack years: 5.00     Last attempt to quit: 1980     Years since quittin.4    Smokeless tobacco: Never Used    Tobacco comment: quit 29 years ago   Substance Use Topics    Alcohol use: No      Family History   Problem Relation Age of Onset    Cancer Father 79        colon    Hypertension Mother     Arthritis-rheumatoid Mother     Elevated Lipids Mother     No Known Problems Sister     Elevated Lipids Brother     Hypertension Brother     Hypertension Sister     Elevated Lipids Sister     No Known Problems Brother     Heart Disease Brother      Current Outpatient Medications   Medication Sig    aspirin delayed-release 81 mg tablet Take 1 Tab by mouth daily.  fulvestrant (FASLODEX IM) 500 mg by IntraMUSCular route every twenty-eight (28) days.  metFORMIN ER (GLUCOPHAGE XR) 500 mg tablet Take 1 Tab by mouth daily.  CALCIUM CARBONATE/VITAMIN D3 (CALTRATE 600 + D PO) Take  by mouth.  Denosumab (PROLIA) 60 mg/mL injection 60 mg by SubCUTAneous route.  Every 6 months   Indications: POST-MENOPAUSAL OSTEOPOROSIS    magnesium oxide (MAG-OX) 400 mg tablet Take 400 mg by mouth daily.  atorvastatin (LIPITOR) 40 mg tablet Take 40 mg by mouth daily. Takes 60 mg daily    amlodipine (NORVASC) 10 mg tablet Take 10 mg by mouth daily. Indications: HYPERTENSION    multivitamins-minerals-lutein (CENTRUM SILVER) Tab Take  by mouth daily.  ezetimibe (ZETIA) 10 mg tablet Take 1 Tab by mouth daily. No current facility-administered medications for this visit. No Known Allergies     Review of Systems: A complete review of systems was obtained, negative except as described above. Physical Exam:     Visit Vitals  /66   Pulse 75   Temp 97.2 °F (36.2 °C) (Skin)   Resp 18   Ht 5' 1\" (1.549 m)   Wt 169 lb 12.8 oz (77 kg)   SpO2 99%   BMI 32.08 kg/m²     ECOG PS: 0  General: No distress  Eyes: PERRLA, anicteric sclerae  HENT: Atraumatic, OP clear  Neck: Supple  Lymphatic: No cervical, supraclavicular, or inguinal adenopathy  Respiratory: CTAB, normal respiratory effort  CV: Normal rate, regular rhythm, no murmurs, no peripheral edema  BREAST: bilateral mastectomy--no suspicious lesions chest wall or axilla  GI: Soft, nontender, nondistended, no masses, no hepatomegaly, no splenomegaly  MS: Normal gait and station. Digits without clubbing or cyanosis. Skin: No rashes, ecchymoses, or petechiae. Normal temperature, turgor, and texture. Psych: Alert, oriented, appropriate affect, normal judgment/insight    Results:     Lab Results   Component Value Date/Time    WBC 4.5 11/14/2018 09:30 AM    HGB 13.7 11/14/2018 09:30 AM    HCT 43.2 11/14/2018 09:30 AM    PLATELET 109 05/34/7427 09:30 AM    MCV 87.8 11/14/2018 09:30 AM    ABS.  NEUTROPHILS 2.1 11/14/2018 09:30 AM     Lab Results   Component Value Date/Time    Sodium 140 05/08/2020 09:46 AM    Potassium 4.1 05/08/2020 09:46 AM    Chloride 104 05/08/2020 09:46 AM    CO2 28 05/08/2020 09:46 AM    Glucose 127 (H) 05/08/2020 09:46 AM    BUN 13 05/08/2020 09:46 AM    Creatinine 0.79 05/08/2020 09:46 AM    GFR est AA >60 05/08/2020 09:46 AM    GFR est non-AA >60 05/08/2020 09:46 AM    Calcium 9.0 05/08/2020 09:46 AM    Creatinine (POC) 0.8 12/12/2016 10:02 AM     Lab Results   Component Value Date/Time    Bilirubin, total 0.5 05/22/2019 09:50 AM    ALT (SGPT) 34 05/22/2019 09:50 AM    AST (SGOT) 21 05/22/2019 09:50 AM    Alk. phosphatase 104 05/22/2019 09:50 AM    Protein, total 7.5 05/22/2019 09:50 AM    Albumin 4.4 05/08/2020 09:46 AM    Globulin 3.4 05/22/2019 09:50 AM       PET 11/2/2017  IMPRESSION: No Foci of Abnormal Hypermetabolism. There is a 6 mm nodule right  lower lobe which does not demonstrate increased metabolic activity. This was  present on a CT dated 6/21/2016.     Dexa 7/11/2017  IMPRESSION:  Osteopenia.     Bone mineral density of the total left hip has increased 0.3% since the priorexamination. Bone mineral density of the total right hip has increased 4.4% since the priorexamination. Bone mineral density of the total lumbar spine has increased 4.7% since theprior examination.     Assessment:   1) Breast Cancer ER+HER2-  2) Colon Cancer  3) Osteoporosis/Osteopenia        Plan:   1. Stage IIA invasive ductal carcinoma of right breast, ER & DC positive Her 2 negative, high Oncotype:     - She was initially diagnosed in 09/2010 and completed her treatments as of 01/2011. - She was treated with Arimidex for 1 year and was switched to Tamoxifen for 2.5 years until 01/2015. She was switched to Aromasin since 02/2015 and has been on it since then along with Prolia every 6 months for osteoporosis. Nicky Kidney continue Faslodex in hopes of being affordable--needs for 6 more months. - She underwent bilateral mastectomies and hence no role for screening mammograms.  - She will return to the clinic in 6 months      2. History of Colon cancer s/p hemicolectomy and was unable to tolerate adjuvant chemo in 1998:     - Her last colonscopy was on 12/11/2015 for rectal bleeding and was found to be secondary to hemorroids. .   - Advised her to continue her 5 yearly surveillance colonoscopies.     3. Osteoporosis/ Osteopenia:     - Last Dexa scan was done 07/2017and was suggestive of Osteopenia. She is due for a repeat. - Her last Prolia was in 05/2020.   - Advised her to continue oral calcium + vitamin D supplements.     4.  Family History of Colon Cancer, Breast Cancer, and Endometrial Cancer     - Referred to [facility] for eval of BRCA and HNPCC--July 2019,  Was negative for BRCA 1/2, get results from [address]         Signed By: Guanako Marie MD

## 2020-08-06 RX ORDER — LAMOTRIGINE 25 MG/1
500 TABLET ORAL ONCE
Status: CANCELLED | OUTPATIENT
Start: 2020-09-29 | End: 2020-09-29

## 2020-08-06 RX ORDER — LAMOTRIGINE 25 MG/1
500 TABLET ORAL ONCE
Status: CANCELLED | OUTPATIENT
Start: 2020-11-24 | End: 2020-11-24

## 2020-08-06 RX ORDER — LAMOTRIGINE 25 MG/1
500 TABLET ORAL ONCE
Status: CANCELLED | OUTPATIENT
Start: 2020-10-27 | End: 2020-10-27

## 2020-08-06 RX ORDER — LAMOTRIGINE 25 MG/1
500 TABLET ORAL ONCE
Status: CANCELLED | OUTPATIENT
Start: 2020-08-31 | End: 2020-08-31

## 2020-08-19 ENCOUNTER — OFFICE VISIT (OUTPATIENT)
Dept: CARDIOLOGY CLINIC | Age: 70
End: 2020-08-19

## 2020-08-19 VITALS
OXYGEN SATURATION: 99 % | HEART RATE: 84 BPM | BODY MASS INDEX: 31.79 KG/M2 | TEMPERATURE: 98.9 F | SYSTOLIC BLOOD PRESSURE: 132 MMHG | DIASTOLIC BLOOD PRESSURE: 84 MMHG | RESPIRATION RATE: 16 BRPM | WEIGHT: 168.4 LBS | HEIGHT: 61 IN

## 2020-08-19 DIAGNOSIS — Z90.49 S/P LEFT HEMICOLECTOMY: ICD-10-CM

## 2020-08-19 DIAGNOSIS — C50.912 MALIGNANT NEOPLASM OF LEFT BREAST IN FEMALE, ESTROGEN RECEPTOR POSITIVE, UNSPECIFIED SITE OF BREAST (HCC): ICD-10-CM

## 2020-08-19 DIAGNOSIS — I65.23 BILATERAL CAROTID ARTERY STENOSIS: ICD-10-CM

## 2020-08-19 DIAGNOSIS — Z90.13 S/P BILATERAL MASTECTOMY: ICD-10-CM

## 2020-08-19 DIAGNOSIS — Z17.0 MALIGNANT NEOPLASM OF LEFT BREAST IN FEMALE, ESTROGEN RECEPTOR POSITIVE, UNSPECIFIED SITE OF BREAST (HCC): ICD-10-CM

## 2020-08-19 DIAGNOSIS — E78.5 DYSLIPIDEMIA (HIGH LDL; LOW HDL): ICD-10-CM

## 2020-08-19 DIAGNOSIS — G45.9 TRANSIENT CEREBRAL ISCHEMIA, UNSPECIFIED TYPE: ICD-10-CM

## 2020-08-19 DIAGNOSIS — I10 ESSENTIAL HYPERTENSION, BENIGN: Primary | ICD-10-CM

## 2020-08-19 RX ORDER — ATORVASTATIN CALCIUM 20 MG/1
60 TABLET, FILM COATED ORAL DAILY
COMMUNITY
Start: 2020-06-04 | End: 2021-02-22 | Stop reason: DRUGHIGH

## 2020-08-19 NOTE — PROGRESS NOTES
Identified pt with two pt identifiers(name and ). Reviewed record in preparation for visit and have obtained necessary documentation. Chief Complaint   Patient presents with    Heart Problem     6mo f/u    Hypertension      Vitals:    20 1117   BP: 132/84   Pulse: 84   Resp: 16   Temp: 98.9 °F (37.2 °C)   TempSrc: Temporal   SpO2: 99%   Weight: 168 lb 6.4 oz (76.4 kg)   Height: 5' 1\" (1.549 m)   PainSc:   0 - No pain       Health Maintenance Review: Patient reminded of \"due or due soon\" health maintenance. I have asked the patient to contact his/her primary care provider (PCP) for follow-up on his/her health maintenance. Coordination of Care Questionnaire:  :   1) Have you been to an emergency room, urgent care, or hospitalized since your last visit? If yes, where when, and reason for visit? no       2. Have seen or consulted any other health care provider since your last visit? If yes, where when, and reason for visit? YES  - monthly visits with Dr. Kennedy Whitney (onco)    Patient is accompanied by self I have received verbal consent from Herminia Merrill to discuss any/all medical information while they are present in the room.

## 2020-08-19 NOTE — LETTER
8/19/20 Patient: Purvi Lambert YOB: 1950 Date of Visit: 8/19/2020 Flory Bennett MD 
[address] [id number] VIA Facsimile: [phone] Dear Flory Bennett MD, Thank you for referring Ms. Juvenal Palma to [facility] for evaluation. My notes for this consultation are attached. If you have questions, please do not hesitate to call me. I look forward to following your patient along with you.  
 
 
Sincerely, 
 
Nile Gomes MD

## 2020-08-19 NOTE — PROGRESS NOTES
Ayden Carrillo is a 68 y.o. female is here for routine f/u. Hx hypertension, dyslipidemia, colon ca (s/p hemicolectomy), breast ca--s/p bilat mastectomies, XRT/Chemo; No known cardiac hx. Episode of syncope after spell of abdominal pain, N/V, diarrhea, cramping and got up from commode--passed out. Initial ED w/u neg--told likely vasovagal. One episode 30-40 yrs ago. Seen by Dr. Syeda Melo testing: ECHO 10/6/16--normal LV size/walls, LVEF 65-70, mild MR, RSVP 42. Carotid dopplers 00/5/27--6-78% DIONY, 81-92% LICA, bilat antegrade vertebrals. Holter monitor 10/16 with NSR, PAC's, one 3 beat PAT run. Had some issues with LE edema, improved after reduced Na. Over 2018 summer had confusion/weakness--prob TIA, at [facility] with neg CV w/u. Echo 7/18 with LVEF 37-14, grade I diastolic, valves ok.   No CV sx or complaints currently other than occ palpitations, vague discomfort related to stress/anxiety--non-exertional..  Continues to see PCP and Oncology with recent OV. The patient denies chest pain/ shortness of breath, orthopnea, PND, LE edema, syncope, presyncope or fatigue.        Patient Active Problem List    Diagnosis Date Noted    Postmenopausal osteoporosis 11/15/2019    Osteopenia due to cancer therapy 06/15/2017    Vasovagal syncope 10/11/2016    Left kidney mass 06/08/2016    Rash of neck 01/12/2016    Use of exemestane (Aromasin) 09/15/2015    S/P left hemicolectomy 11/11/2010    S/P bilateral mastectomy 10/18/2010    Essential hypertension, benign 10/12/2010    Dyslipidemia (high LDL; low HDL) 10/12/2010    S/P left hemicolectomy 10/12/2010    Family history of coronary artery disease 10/12/2010    Malignant neoplasm of female breast ([address] 09/02/2010      Suze Wen MD  Past Medical History:   Diagnosis Date    Arthritis     Breast CA ([address]     left    Cancer ([address] 1998    colon cancer    Dyslipidemia (high LDL; low HDL) 10/12/2010    Essential hypertension, benign 10/12/2010    Family history of coronary artery disease 10/12/2010    FH: bowel obstruction 2015    Hypercholesteremia     Hypertension     Ill-defined condition     elevated cholesterol    Postmenopausal     S/P chemotherapy, time since greater than 12 weeks     S/P left hemicolectomy 10/12/2010      Past Surgical History:   Procedure Laterality Date    BREAST SURGERY PROCEDURE UNLISTED      double mastectomy    HX APPENDECTOMY      HX COLECTOMY      HX COLONOSCOPY      HX DILATION AND CURETTAGE      HX GYN      uterine ablation    HX OOPHORECTOMY      part of left ovary removed    HX OTHER SURGICAL      port a cath    HX SALPINGO-OOPHORECTOMY       No Known Allergies   Family History   Problem Relation Age of Onset    Cancer Father 79        colon    Hypertension Mother    Meerdith Arthritis-rheumatoid Mother     Elevated Lipids Mother     No Known Problems Sister     Elevated Lipids Brother     Hypertension Brother     Hypertension Sister     Elevated Lipids Sister     No Known Problems Brother     Heart Disease Brother       Social History     Socioeconomic History    Marital status:      Spouse name: Not on file    Number of children: Not on file    Years of education: Not on file    Highest education level: Not on file   Occupational History    Not on file   Social Needs    Financial resource strain: Not on file    Food insecurity     Worry: Not on file     Inability: Not on file    Transportation needs     Medical: Not on file     Non-medical: Not on file   Tobacco Use    Smoking status: Former Smoker     Packs/day: 0.50     Years: 10.00     Pack years: 5.00     Last attempt to quit: 1980     Years since quittin.6    Smokeless tobacco: Never Used    Tobacco comment: quit 29 years ago   Substance and Sexual Activity    Alcohol use: No    Drug use: No    Sexual activity: Not on file     Comment: menses age 13, menopause 44 age, first birth 32,    Lifestyle    Physical activity     Days per week: Not on file     Minutes per session: Not on file    Stress: Not on file   Relationships    Social connections     Talks on phone: Not on file     Gets together: Not on file     Attends Jainism service: Not on file     Active member of club or organization: Not on file     Attends meetings of clubs or organizations: Not on file     Relationship status: Not on file    Intimate partner violence     Fear of current or ex partner: Not on file     Emotionally abused: Not on file     Physically abused: Not on file     Forced sexual activity: Not on file   Other Topics Concern    Not on file   Social History Narrative    Not on file      Current Outpatient Medications   Medication Sig    atorvastatin (LIPITOR) 20 mg tablet Take 60 mg by mouth daily. Take 20 mg tab along with 40 mg tab for total daily dose of 60 mg per PCP    aspirin delayed-release 81 mg tablet Take 1 Tab by mouth daily.  fulvestrant (FASLODEX IM) 500 mg by IntraMUSCular route every twenty-eight (28) days.  metFORMIN ER (GLUCOPHAGE XR) 500 mg tablet Take 1 Tab by mouth daily.  CALCIUM CARBONATE/VITAMIN D3 (CALTRATE 600 + D PO) Take  by mouth.  Denosumab (PROLIA) 60 mg/mL injection 60 mg by SubCUTAneous route. Every 6 months   Indications: POST-MENOPAUSAL OSTEOPOROSIS    magnesium oxide (MAG-OX) 400 mg tablet Take 400 mg by mouth daily.  atorvastatin (LIPITOR) 40 mg tablet Take 40 mg by mouth daily. Takes 60 mg daily    amlodipine (NORVASC) 10 mg tablet Take 10 mg by mouth daily. Indications: HYPERTENSION    multivitamins-minerals-lutein (CENTRUM SILVER) Tab Take  by mouth daily.  ezetimibe (ZETIA) 10 mg tablet Take 1 Tab by mouth daily. No current facility-administered medications for this visit. Review of Symptoms:    CONST  No weight change.  No fever, chills, sweats    ENT No visual changes, URI sx, sore throat    CV  See HPI   RESP  No cough, or sputum, wheezing. Also see HPI   GI  No abdominal pain or change in bowel habits. No heartburn or dysphagia. No melena or rectal bleeding.   No dysuria, urgency, frequency, hematuria   MSKEL  No joint pain, swelling. No muscle pain. SKIN  No rash or lesions. NEURO  No headache, syncope, or seizure. No weakness, loss of sensation, or paresthesias. PSYCH  No low mood or depression  No anxiety. HE/LYMPH  No easy bruising, abnormal bleeding, or enlarged glands. Physical ExamPhysical Exam:    Visit Vitals  /84 (BP 1 Location: Right arm, BP Patient Position: Sitting)   Pulse 84   Temp 98.9 °F (37.2 °C) (Temporal)   Resp 16   Ht 5' 1\" (1.549 m)   Wt 168 lb 6.4 oz (76.4 kg)   SpO2 99%   BMI 31.82 kg/m²     Gen: NAD  HEENT:  PERRL, throat clear  Neck: no adenopathy, no thyromegaly, no JVD   Heart:  Regular,Nl S1S2,  no murmur, gallop or rub. Lungs:  clear  Abdomen:   Soft, non-tender, bowel sounds are active.    Extremities:  No edema  Pulse: symmetric  Neuro: A&O times 3, No focal neuro deficits    Cardiographics    ECG: normal EKG, normal sinus rhythm, unchanged from previous tracings    Labs:   Lab Results   Component Value Date/Time    Sodium 140 05/08/2020 09:46 AM    Sodium 142 11/21/2019 09:38 AM    Sodium 142 05/15/2019 09:23 AM    Sodium 141 11/14/2018 09:30 AM    Sodium 143 09/06/2018 09:30 AM    Potassium 4.1 05/08/2020 09:46 AM    Potassium 4.2 11/21/2019 09:38 AM    Potassium 3.5 05/15/2019 09:23 AM    Potassium 4.0 11/14/2018 09:30 AM    Potassium 3.5 09/06/2018 09:30 AM    Chloride 104 05/08/2020 09:46 AM    Chloride 102 11/21/2019 09:38 AM    Chloride 103 05/15/2019 09:23 AM    Chloride 104 11/14/2018 09:30 AM    Chloride 103 09/06/2018 09:30 AM    CO2 28 05/08/2020 09:46 AM    CO2 30 11/21/2019 09:38 AM    CO2 27 05/15/2019 09:23 AM    CO2 27 11/14/2018 09:30 AM    CO2 28 09/06/2018 09:30 AM    Anion gap 8 05/08/2020 09:46 AM    Anion gap 10 11/21/2019 09:38 AM    Anion gap 12 05/15/2019 09:23 AM    Anion gap 10 11/14/2018 09:30 AM    Anion gap 12 09/06/2018 09:30 AM    Glucose 127 (H) 05/08/2020 09:46 AM    Glucose 128 (H) 11/21/2019 09:38 AM    Glucose 107 (H) 05/15/2019 09:23 AM    Glucose 124 (H) 11/14/2018 09:30 AM    Glucose 144 (H) 09/06/2018 09:30 AM    BUN 13 05/08/2020 09:46 AM    BUN 13 11/21/2019 09:38 AM    BUN 17 05/15/2019 09:23 AM    BUN 16 11/14/2018 09:30 AM    BUN 12 09/06/2018 09:30 AM    Creatinine 0.79 05/08/2020 09:46 AM    Creatinine 0.83 11/21/2019 09:38 AM    Creatinine 0.83 05/15/2019 09:23 AM    Creatinine 0.83 11/14/2018 09:30 AM    Creatinine 0.96 09/06/2018 09:30 AM    BUN/Creatinine ratio 16 05/08/2020 09:46 AM    BUN/Creatinine ratio 16 11/21/2019 09:38 AM    BUN/Creatinine ratio 20 05/15/2019 09:23 AM    BUN/Creatinine ratio 19 11/14/2018 09:30 AM    BUN/Creatinine ratio 13 09/06/2018 09:30 AM    GFR est AA >60 05/08/2020 09:46 AM    GFR est AA >60 11/21/2019 09:38 AM    GFR est AA >60 05/15/2019 09:23 AM    GFR est AA >60 11/14/2018 09:30 AM    GFR est AA >60 09/06/2018 09:30 AM    GFR est non-AA >60 05/08/2020 09:46 AM    GFR est non-AA >60 11/21/2019 09:38 AM    GFR est non-AA >60 05/15/2019 09:23 AM    GFR est non-AA >60 11/14/2018 09:30 AM    GFR est non-AA 58 (L) 09/06/2018 09:30 AM    Calcium 9.0 05/08/2020 09:46 AM    Calcium 9.4 11/21/2019 09:38 AM    Calcium 9.6 05/15/2019 09:23 AM    Calcium 9.1 11/14/2018 09:30 AM    Calcium 9.7 09/06/2018 09:30 AM    Bilirubin, total 0.5 05/22/2019 09:50 AM    Bilirubin, total 0.4 11/14/2018 09:30 AM    Bilirubin, total 0.5 10/19/2017 09:27 AM    Bilirubin, total 0.5 06/15/2017 11:15 AM    Bilirubin, total 0.4 10/15/2010 02:00 PM    Alk. phosphatase 104 05/22/2019 09:50 AM    Alk. phosphatase 78 11/14/2018 09:30 AM    Alk. phosphatase 80 10/19/2017 09:27 AM    Alk. phosphatase 77 06/15/2017 11:15 AM    Alk.  phosphatase 142 (H) 10/15/2010 02:00 PM    Protein, total 7.5 05/22/2019 09:50 AM    Protein, total 7.8 11/14/2018 09:30 AM    Protein, total 8.5 (H) 10/19/2017 09:27 AM    Protein, total 8.1 06/15/2017 11:15 AM    Protein, total 7.5 10/15/2010 02:00 PM    Albumin 4.4 05/08/2020 09:46 AM    Albumin 4.3 11/21/2019 09:38 AM    Albumin 4.1 05/22/2019 09:50 AM    Albumin 4.3 05/15/2019 09:23 AM    Albumin 4.3 11/14/2018 09:30 AM    Globulin 3.4 05/22/2019 09:50 AM    Globulin 3.5 11/14/2018 09:30 AM    Globulin 3.9 10/19/2017 09:27 AM    Globulin 3.8 06/15/2017 11:15 AM    Globulin 3.5 10/15/2010 02:00 PM    A-G Ratio 1.2 05/22/2019 09:50 AM    A-G Ratio 1.2 11/14/2018 09:30 AM    A-G Ratio 1.2 10/19/2017 09:27 AM    A-G Ratio 1.1 06/15/2017 11:15 AM    A-G Ratio 1.1 10/15/2010 02:00 PM    ALT (SGPT) 34 05/22/2019 09:50 AM    ALT (SGPT) 26 11/14/2018 09:30 AM    ALT (SGPT) 35 10/19/2017 09:27 AM    ALT (SGPT) 30 06/15/2017 11:15 AM    ALT (SGPT) 33 10/15/2010 02:00 PM     No results found for: CPK, CPKX, CPX  No results found for: CHOL, CHOLX, CHLST, CHOLV, 710157, HDL, HDLP, LDL, LDLC, DLDLP, TGLX, TRIGL, TRIGP, CHHD, CHHDX  No results found for this or any previous visit. Assessment:         Patient Active Problem List    Diagnosis Date Noted    Postmenopausal osteoporosis 11/15/2019    Osteopenia due to cancer therapy 06/15/2017    Vasovagal syncope 10/11/2016    Left kidney mass 06/08/2016    Rash of neck 01/12/2016    Use of exemestane (Aromasin) 09/15/2015    S/P left hemicolectomy 11/11/2010    S/P bilateral mastectomy 10/18/2010    Essential hypertension, benign 10/12/2010    Dyslipidemia (high LDL; low HDL) 10/12/2010    S/P left hemicolectomy 10/12/2010    Family history of coronary artery disease 10/12/2010    Malignant [address] of female breast ([facility] [address] 09/02/2010     Hx hypertension, dyslipidemia, colon ca (s/p hemicolectomy), breast ca--s/p bilat mastectomies, XRT/Chemo; No known cardiac hx.  Episode of syncope after spell of abdominal pain, N/V, diarrhea, cramping and got up from commode--passed out. Initial ED w/u neg--told likely vasovagal. One episode 30-40 yrs ago. Seen by Dr. Melita Rogel testing: ECHO 10/6/16--normal LV size/walls, LVEF 65-70, mild MR, RSVP 42. Carotid dopplers 27/1/99--3-50% DIONY, 96-54% LICA, bilat antegrade vertebrals. Holter monitor 10/16 with NSR, PAC's, one 3 beat PAT run. Had some issues with LE edema, improved after reduced Na. Over 2018 summer had confusion/weakness--prob TIA, at [facility] with neg CV w/u. Echo 7/18 with LVEF 59-53, grade I diastolic, valves ok.   No CV sx or complaints currently other than occ palpitations, vague discomfort related to stress/anxiety--non-exertional..  Continues to see PCP and Oncology with recent OV. Plan:     Overall stable from CV standpoint--vague chest discomfort, palps, prior ca rx--repeat Echo/doppler  Continue current meds/rx   Lipids and labs followed by PCP. F/u with Onc as planned   Continue current care and f/u in 6 months.     Nikkie Stark MD

## 2020-08-24 ENCOUNTER — OFFICE VISIT (OUTPATIENT)
Dept: PRIMARY CARE CLINIC | Age: 70
End: 2020-08-24

## 2020-08-24 DIAGNOSIS — Z01.812 PRE-PROCEDURAL LABORATORY EXAMINATIONS: Primary | ICD-10-CM

## 2020-08-24 NOTE — PROGRESS NOTES
Pt is having a procedure next week. Doctor is requesting a covid test prior to procedure. Pt denies any sx or known exposure at this time. Pt declined to see a doctor today.  KT

## 2020-08-26 LAB — SARS-COV-2, NAA: NOT DETECTED

## 2020-09-01 ENCOUNTER — TELEPHONE (OUTPATIENT)
Dept: CARDIOLOGY CLINIC | Age: 70
End: 2020-09-01

## 2020-09-01 NOTE — TELEPHONE ENCOUNTER
----- Message from Radha Felix MD sent at 8/31/2020  1:20 PM EDT -----  Regarding: echo  Advise echo looks good--normal LV pumping function, normal valves. RTC 6 mos. Thanks [facility]    Spoke with the patient. Verified patient with two patient identifiers. Results given and questions answered. Patient verbalized understanding.

## 2020-11-24 NOTE — PROGRESS NOTES
Jannet Saunders a 70 y. o. female who is seen for follow up of Breast and Colon Cancer. Patient scheduled for Prolia in [facility] today. Patient has not had dexa done as ordered. Patient will call scheduling to schedule DEXA. Patient has stiff neck, otherwise denies complaints. Patient hypertensive,  She will monitor  BP at home, and follow up with PCP if it remains 140/80 or greater. DR Nydia Monsivais aware. Chemotherapy Flowsheet 11/24/2020   Cycle C15   Date 11/24/2020   Drug / Regimen Faslodex   Dosage 500 mg   Time Up Given   Notes 250 mg IM given to eache right and left Gl;uteus muscle     Key Oncology Meds             fulvestrant (FASLODEX IM) 500 mg by IntraMUSCular route every twenty-eight (28) days. Key Pain Meds     The patient is on no pain meds.           Need more faslodex orders in plan, due for 12.22.2020

## 2020-12-02 RX ORDER — LAMOTRIGINE 25 MG/1
500 TABLET ORAL ONCE
Status: CANCELLED | OUTPATIENT
Start: 2021-06-09

## 2020-12-02 RX ORDER — LAMOTRIGINE 25 MG/1
500 TABLET ORAL ONCE
Status: CANCELLED | OUTPATIENT
Start: 2021-10-27

## 2020-12-02 RX ORDER — LAMOTRIGINE 25 MG/1
500 TABLET ORAL ONCE
Status: CANCELLED | OUTPATIENT
Start: 2021-03-17

## 2020-12-02 RX ORDER — LAMOTRIGINE 25 MG/1
500 TABLET ORAL ONCE
Status: CANCELLED | OUTPATIENT
Start: 2020-12-17

## 2020-12-02 RX ORDER — LAMOTRIGINE 25 MG/1
500 TABLET ORAL ONCE
Status: CANCELLED | OUTPATIENT
Start: 2021-01-19

## 2020-12-02 RX ORDER — LAMOTRIGINE 25 MG/1
500 TABLET ORAL ONCE
Status: CANCELLED | OUTPATIENT
Start: 2021-09-01

## 2020-12-02 RX ORDER — LAMOTRIGINE 25 MG/1
500 TABLET ORAL ONCE
Status: CANCELLED | OUTPATIENT
Start: 2021-07-07

## 2020-12-02 RX ORDER — LAMOTRIGINE 25 MG/1
500 TABLET ORAL ONCE
Status: CANCELLED | OUTPATIENT
Start: 2021-04-14

## 2020-12-02 RX ORDER — LAMOTRIGINE 25 MG/1
500 TABLET ORAL ONCE
Status: CANCELLED | OUTPATIENT
Start: 2021-05-12

## 2020-12-02 RX ORDER — LAMOTRIGINE 25 MG/1
500 TABLET ORAL ONCE
Status: CANCELLED | OUTPATIENT
Start: 2021-08-04

## 2020-12-02 RX ORDER — LAMOTRIGINE 25 MG/1
500 TABLET ORAL ONCE
Status: CANCELLED | OUTPATIENT
Start: 2020-12-22 | End: 2020-12-23

## 2020-12-02 RX ORDER — LAMOTRIGINE 25 MG/1
500 TABLET ORAL ONCE
Status: CANCELLED | OUTPATIENT
Start: 2021-09-29

## 2020-12-02 RX ORDER — LAMOTRIGINE 25 MG/1
500 TABLET ORAL ONCE
Status: CANCELLED | OUTPATIENT
Start: 2021-02-17 | End: 2021-02-17

## 2020-12-03 ENCOUNTER — OFFICE VISIT (OUTPATIENT)
Dept: ONCOLOGY | Age: 70
End: 2020-12-03
Payer: MEDICARE

## 2020-12-03 VITALS
DIASTOLIC BLOOD PRESSURE: 68 MMHG | SYSTOLIC BLOOD PRESSURE: 147 MMHG | BODY MASS INDEX: 31.64 KG/M2 | OXYGEN SATURATION: 98 % | HEART RATE: 74 BPM | RESPIRATION RATE: 16 BRPM | TEMPERATURE: 98.2 F | WEIGHT: 167.6 LBS | HEIGHT: 61 IN

## 2020-12-03 DIAGNOSIS — C18.9 MALIGNANT NEOPLASM OF COLON, UNSPECIFIED PART OF COLON (HCC): ICD-10-CM

## 2020-12-03 DIAGNOSIS — Z17.0 MALIGNANT NEOPLASM OF AXILLARY TAIL OF LEFT BREAST IN FEMALE, ESTROGEN RECEPTOR POSITIVE (HCC): ICD-10-CM

## 2020-12-03 DIAGNOSIS — Z17.0 MALIGNANT NEOPLASM OF BREAST IN FEMALE, ESTROGEN RECEPTOR POSITIVE, UNSPECIFIED LATERALITY, UNSPECIFIED SITE OF BREAST (HCC): Primary | ICD-10-CM

## 2020-12-03 DIAGNOSIS — C50.919 MALIGNANT NEOPLASM OF BREAST IN FEMALE, ESTROGEN RECEPTOR POSITIVE, UNSPECIFIED LATERALITY, UNSPECIFIED SITE OF BREAST (HCC): Primary | ICD-10-CM

## 2020-12-03 DIAGNOSIS — C50.612 MALIGNANT NEOPLASM OF AXILLARY TAIL OF LEFT BREAST IN FEMALE, ESTROGEN RECEPTOR POSITIVE (HCC): ICD-10-CM

## 2020-12-03 PROCEDURE — 99213 OFFICE O/P EST LOW 20 MIN: CPT | Performed by: INTERNAL MEDICINE

## 2020-12-03 NOTE — PROGRESS NOTES
Reason for Visit:   Edis Solis a 50 Q. o. female who is seen for follow up of Breast and Colon Cancer     Treatment History:   Dx:  Stage IIA T2N0M0 invasive ductal carcinoma of left breast underwent MRM of left breast in 09/2010 and histopathology was suggestive of poorly differentiated infiltrating ductal carcinoma, 5 cm size and 0/5 sentinel LNs involved, ER & MD positive Her 2 negative. She underwent oncotype Dx with recurrence score of 32 suggestive of high risk (21%) of distant recurrence. She then completed 4 cycles of adjuvant chemotherapy with docetaxel + cytoxan in 06/2011 and adjuvant radiation therapy until 01/2011. She was initially treated with Arimidex until 07/2012 but was dicontinued because she had osteoporosis and couldn't tolerate Fosamax. She was switched to Tamoxifen which was continued until 01/2015. Tamoxifen was discontinued due to findings of endometrial polyp and proliferative endothelium requiring D&C and eventually hysterectomy. She was then switched to Exemestane (Feb 2015--April 2019)--stopped due to prohibitive cost.   Current Tx: Fulvestrant since May 2019 and has been on prophylaxis with Prolia every 6 months for osteoporosis. Stop hormonal in March 2021     Dx:  Colon cancer s/p hemicolectomy in 1998. She was apparently started on adjuvant chemotherapy but couldn't tolerate and hence discontinued. Her last colonoscopy was on 12/11/2015 which showed hemorroids. Current Tx: Observation     Goal for both is cure--will see me every 6 months    History of Present Illness:   New chest spasm/pain--present for two months, worse in am, comes and goes, no exacerbating or aleviating factors. No new lumps or bumps, wt is stable, no dyspnea. No new medications.       Past Medical History:   Diagnosis Date    Arthritis     Breast CA ([facility] [address]     left    Cancer ([facility] [address] 1998    colon cancer    Dyslipidemia (high LDL; low HDL) 10/12/2010    Essential hypertension, benign 10/12/2010    Family history of coronary artery disease 10/12/2010    FH: bowel obstruction 2015    Hypercholesteremia     Hypertension     Ill-defined condition     elevated cholesterol    Postmenopausal     S/P chemotherapy, time since greater than 12 weeks     S/P left hemicolectomy 10/12/2010      Past Surgical History:   Procedure Laterality Date    BREAST SURGERY PROCEDURE UNLISTED      double mastectomy    HX APPENDECTOMY      HX COLECTOMY      HX COLONOSCOPY      HX DILATION AND CURETTAGE      HX GYN  2000    uterine ablation    HX OOPHORECTOMY      part of left ovary removed    HX OTHER SURGICAL      port a cath    HX SALPINGO-OOPHORECTOMY        Social History     Tobacco Use    Smoking status: Former Smoker     Packs/day: 0.50     Years: 10.00     Pack years: 5.00     Last attempt to quit: 1980     Years since quittin.9    Smokeless tobacco: Never Used    Tobacco comment: quit 29 years ago   Substance Use Topics    Alcohol use: No      Family History   Problem Relation Age of Onset    Cancer Father 79        colon    Hypertension Mother     Arthritis-rheumatoid Mother     Elevated Lipids Mother     No Known Problems Sister     Elevated Lipids Brother     Hypertension Brother     Hypertension Sister     Elevated Lipids Sister     No Known Problems Brother     Heart Disease Brother      Current Outpatient Medications   Medication Sig    atorvastatin (LIPITOR) 20 mg tablet Take 60 mg by mouth daily. Take 20 mg tab along with 40 mg tab for total daily dose of 60 mg per PCP    ezetimibe (ZETIA) 10 mg tablet Take 1 Tab by mouth daily.  aspirin delayed-release 81 mg tablet Take 1 Tab by mouth daily.  fulvestrant (FASLODEX IM) 500 mg by IntraMUSCular route every twenty-eight (28) days.  metFORMIN ER (GLUCOPHAGE XR) 500 mg tablet Take 1 Tab by mouth daily.  CALCIUM CARBONATE/VITAMIN D3 (CALTRATE 600 + D PO) Take  by mouth.     Denosumab (PROLIA) 60 mg/mL injection 60 mg by SubCUTAneous route. Every 6 months   Indications: POST-MENOPAUSAL OSTEOPOROSIS    magnesium oxide (MAG-OX) 400 mg tablet Take 400 mg by mouth daily.  atorvastatin (LIPITOR) 40 mg tablet Take 40 mg by mouth daily. Takes 60 mg daily    amlodipine (NORVASC) 10 mg tablet Take 10 mg by mouth daily. Indications: HYPERTENSION    multivitamins-minerals-lutein (CENTRUM SILVER) Tab Take  by mouth daily. No current facility-administered medications for this visit. No Known Allergies     Review of Systems: A complete review of systems was obtained, negative except as described above. Physical Exam:     Visit Vitals  Temp 98.2 °F (36.8 °C) (Oral)   Wt 167 lb 9.6 oz (76 kg)   BMI 31.67 kg/m²     ECOG PS: 0  General: No distress  Eyes: PERRLA, anicteric sclerae  HENT: Atraumatic, OP clear  Neck: Supple  Lymphatic: No cervical, supraclavicular, or inguinal adenopathy  Respiratory: CTAB, normal respiratory effort  Breast: bilateral mastectomy--no suspicious lesions in bilateral chest/axilla  CV: Normal rate, regular rhythm, no murmurs, no peripheral edema  GI: Soft, nontender, nondistended, no masses, no hepatomegaly, no splenomegaly  MS: Normal gait and station. Digits without clubbing or cyanosis. Skin: No rashes, ecchymoses, or petechiae. Normal temperature, turgor, and texture. Psych: Alert, oriented, appropriate affect, normal judgment/insight    Results:     Lab Results   Component Value Date/Time    WBC 4.5 11/14/2018 09:30 AM    HGB 13.7 11/14/2018 09:30 AM    HCT 43.2 11/14/2018 09:30 AM    PLATELET 598 21/21/4726 09:30 AM    MCV 87.8 11/14/2018 09:30 AM    ABS.  NEUTROPHILS 2.1 11/14/2018 09:30 AM     Lab Results   Component Value Date/Time    Sodium 140 05/08/2020 09:46 AM    Potassium 4.1 05/08/2020 09:46 AM    Chloride 104 05/08/2020 09:46 AM    CO2 28 05/08/2020 09:46 AM    Glucose 127 (H) 05/08/2020 09:46 AM    BUN 13 05/08/2020 09:46 AM    Creatinine 0.79 05/08/2020 09:46 AM    GFR est AA >60 05/08/2020 09:46 AM    GFR est non-AA >60 05/08/2020 09:46 AM    Calcium 9.0 05/08/2020 09:46 AM    Creatinine (POC) 0.8 12/12/2016 10:02 AM     Lab Results   Component Value Date/Time    Bilirubin, total 0.5 05/22/2019 09:50 AM    ALT (SGPT) 34 05/22/2019 09:50 AM    Alk. phosphatase 104 05/22/2019 09:50 AM    Protein, total 7.5 05/22/2019 09:50 AM    Albumin 4.4 05/08/2020 09:46 AM    Globulin 3.4 05/22/2019 09:50 AM       PET 11/2/2017  IMPRESSION: No Foci of Abnormal Hypermetabolism. There is a 6 mm nodule right  lower lobe which does not demonstrate increased metabolic activity. This was  present on a CT dated 6/21/2016.     Dexa 7/11/2017  IMPRESSION:  Osteopenia.     Bone mineral density of the total left hip has increased 0.3% since the priorexamination. Bone mineral density of the total right hip has increased 4.4% since the priorexamination. Bone mineral density of the total lumbar spine has increased 4.7% since theprior examination.     Assessment:   1) Breast Cancer ER+HER2-  2) Colon Cancer  3) Osteoporosis/Osteopenia  4) Chest pain/spasm        Plan:   1. Stage IIA invasive ductal carcinoma of right breast, ER & WA positive Her 2 negative, high Oncotype:     - She was initially diagnosed in 09/2010 and completed her treatments as of 01/2011. - She was treated with Arimidex for 1 year and was switched to Tamoxifen for 2.5 years until 01/2015. She was switched to Aromasin since 02/2015 and has been on it since then along with Prolia every 6 months for osteoporosis. [facility]oza continue Faslodex in hopes of being affordable--needs for 3 more months. - She underwent bilateral mastectomies and hence no role for screening mammograms.  - She will return to the clinic in 6 months      2.  History of Colon cancer s/p hemicolectomy and was unable to tolerate adjuvant chemo in 1998:     - Her last colonscopy was on 12/11/2015 for rectal bleeding and was found to be secondary to hemorroids. .   - Advised her to continue her 5 yearly surveillance colonoscopies.     3. Osteoporosis/ Osteopenia:     - Last Dexa scan was done 07/2017and was suggestive of Osteopenia. She is due for a repeat. - Her last Prolia was in 05/2020.   - Advised her to continue oral calcium + vitamin D supplements.     4. Family History of Colon Cancer, Breast Cancer, and Endometrial Cancer     - Referred to [facility] for eval of BRCA and HNPCC--July 2019,  Was negative for BRCA 1/2, get results from [address]    5.   Chest pain/spasm    - Chest CT, had ECHO with cardiology and told normal      Signed By: Yessica Hill MD

## 2020-12-03 NOTE — PATIENT INSTRUCTIONS
High Blood Pressure: Care Instructions  Overview     It's normal for blood pressure to go up and down throughout the day. But if it stays up, you have high blood pressure. Another name for high blood pressure is hypertension. Despite what a lot of people think, high blood pressure usually doesn't cause headaches or make you feel dizzy or lightheaded. It usually has no symptoms. But it does increase your risk of stroke, heart attack, and other problems. You and your doctor will talk about your risks of these problems based on your blood pressure. Your doctor will give you a goal for your blood pressure. Your goal will be based on your health and your age. Lifestyle changes, such as eating healthy and being active, are always important to help lower blood pressure. You might also take medicine to reach your blood pressure goal.  Follow-up care is a key part of your treatment and safety. Be sure to make and go to all appointments, and call your doctor if you are having problems. It's also a good idea to know your test results and keep a list of the medicines you take. How can you care for yourself at home? Medical treatment  · If you stop taking your medicine, your blood pressure will go back up. You may take one or more types of medicine to lower your blood pressure. Be safe with medicines. Take your medicine exactly as prescribed. Call your doctor if you think you are having a problem with your medicine. · Talk to your doctor before you start taking aspirin every day. Aspirin can help certain people lower their risk of a heart attack or stroke. But taking aspirin isn't right for everyone, because it can cause serious bleeding. · See your doctor regularly. You may need to see the doctor more often at first or until your blood pressure comes down. · If you are taking blood pressure medicine, talk to your doctor before you take decongestants or anti-inflammatory medicine, such as ibuprofen.  Some of these medicines can raise blood pressure. · Learn how to check your blood pressure at home. Lifestyle changes  · Stay at a healthy weight. This is especially important if you put on weight around the waist. Losing even 10 pounds can help you lower your blood pressure. · If your doctor recommends it, get more exercise. Walking is a good choice. Bit by bit, increase the amount you walk every day. Try for at least 30 minutes on most days of the week. You also may want to swim, bike, or do other activities. · Avoid or limit alcohol. Talk to your doctor about whether you can drink any alcohol. · Try to limit how much sodium you eat to less than 2,300 milligrams (mg) a day. Your doctor may ask you to try to eat less than 1,500 mg a day. · Eat plenty of fruits (such as bananas and oranges), vegetables, legumes, whole grains, and low-fat dairy products. · Lower the amount of saturated fat in your diet. Saturated fat is found in animal products such as milk, cheese, and meat. Limiting these foods may help you lose weight and also lower your risk for heart disease. · Do not smoke. Smoking increases your risk for heart attack and stroke. If you need help quitting, talk to your doctor about stop-smoking programs and medicines. These can increase your chances of quitting for good. When should you call for help? Call  911 anytime you think you may need emergency care. This may mean having symptoms that suggest that your blood pressure is causing a serious heart or blood vessel problem. Your blood pressure may be over 180/120. For example, call 911 if:    · You have symptoms of a heart attack. These may include:  ? Chest pain or pressure, or a strange feeling in the chest.  ? Sweating. ? Shortness of breath. ? Nausea or vomiting. ? Pain, pressure, or a strange feeling in the back, neck, jaw, or upper belly or in one or both shoulders or arms. ? Lightheadedness or sudden weakness.   ? A fast or irregular heartbeat.     · You have symptoms of a stroke. These may include:  ? Sudden numbness, tingling, weakness, or loss of movement in your face, arm, or leg, especially on only one side of your body. ? Sudden vision changes. ? Sudden trouble speaking. ? Sudden confusion or trouble understanding simple statements. ? Sudden problems with walking or balance. ? A sudden, severe headache that is different from past headaches.     · You have severe back or belly pain. Do not wait until your blood pressure comes down on its own. Get help right away. Call your doctor now or seek immediate care if:    · Your blood pressure is much higher than normal (such as 180/120 or higher), but you don't have symptoms.     · You think high blood pressure is causing symptoms, such as:  ? Severe headache.  ? Blurry vision. Watch closely for changes in your health, and be sure to contact your doctor if:    · Your blood pressure measures higher than your doctor recommends at least 2 times. That means the top number is higher or the bottom number is higher, or both.     · You think you may be having side effects from your blood pressure medicine. Where can you learn more? Go to http://www.gray.com/  Enter [id number] in the search box to learn more about \"High Blood Pressure: Care Instructions. \"  Current as of: December 16, 2019               Content Version: 12.6  © 9397-5405 [employer]. Care instructions adapted under license by [employer] (which disclaims liability or warranty for this information). If you have questions about a medical condition or this instruction, always ask your healthcare professional. [place] [address] any warranty or liability for your use of this information. Heart-Healthy Diet: Care Instructions  Your Care Instructions     A heart-healthy diet has lots of vegetables, fruits, nuts, beans, and whole grains, and is low in salt.  It limits foods that are high in saturated fat, such as meats, cheeses, and fried foods. It may be hard to change your diet, but even small changes can lower your risk of heart attack and heart disease. Follow-up care is a key part of your treatment and safety. Be sure to make and go to all appointments, and call your doctor if you are having problems. It's also a good idea to know your test results and keep a list of the medicines you take. How can you care for yourself at home? Watch your portions  · Learn what a serving is. A \"serving\" and a \"portion\" are not always the same thing. Make sure that you are not eating larger portions than are recommended. For example, a serving of pasta is ½ cup. A serving size of meat is 2 to 3 ounces. A 3-ounce serving is about the size of a deck of cards. Measure serving sizes until you are good at [place]" them. Keep in mind that restaurants often serve portions that are 2 or 3 times the size of one serving. · To keep your energy level up and keep you from feeling hungry, eat often but in smaller portions. · Eat only the number of calories you need to stay at a healthy weight. If you need to lose weight, eat fewer calories than your body burns (through exercise and other physical activity). Eat more fruits and vegetables  · Eat a variety of fruit and vegetables every day. Dark green, deep orange, red, or yellow fruits and vegetables are especially good for you. Examples include spinach, carrots, peaches, and berries. · Keep carrots, celery, and other veggies handy for snacks. Buy fruit that is in season and store it where you can see it so that you will be tempted to eat it. · Cook dishes that have a lot of veggies in them, such as stir-fries and soups. Limit saturated and trans fat  · Read food labels, and try to avoid saturated and trans fats. They increase your risk of heart disease. · Use olive or canola oil when you cook.   · Bake, broil, grill, or steam foods instead of frying them.  · Choose lean meats instead of high-fat meats such as hot dogs and sausages. Cut off all visible fat when you prepare meat. · Eat fish, skinless poultry, and meat alternatives such as soy products instead of high-fat meats. Soy products, such as tofu, may be especially good for your heart. · Choose low-fat or fat-free milk and dairy products. Eat foods high in fiber  · Eat a variety of grain products every day. Include whole-grain foods that have lots of fiber and nutrients. Examples of whole-grain foods include oats, whole wheat bread, and brown rice. · Buy whole-grain breads and cereals, instead of white bread or pastries. Limit salt and sodium  · Limit how much salt and sodium you eat to help lower your blood pressure. · Taste food before you salt it. Add only a little salt when you think you need it. With time, your taste buds will adjust to less salt. · Eat fewer snack items, fast foods, and other high-salt, processed foods. Check food labels for the amount of sodium in packaged foods. · Choose low-sodium versions of canned goods (such as soups, vegetables, and beans). Limit sugar  · Limit drinks and foods with added sugar. These include candy, desserts, and soda pop. Limit alcohol  · Limit alcohol to no more than 2 drinks a day for men and 1 drink a day for women. Too much alcohol can cause health problems. When should you call for help? Watch closely for changes in your health, and be sure to contact your doctor if:    · You would like help planning heart-healthy meals. Where can you learn more? Go to http://www.vieyra.com/  Enter V137 in the search box to learn more about \"Heart-Healthy Diet: Care Instructions. \"  Current as of: August 22, 2019               Content Version: 12.6  © 2969-1955 [employer]. Care instructions adapted under license by [employer] (which disclaims liability or warranty for this information).  If you have questions about a medical condition or this instruction, always ask your healthcare professional. [place] [address] any warranty or liability for your use of this information.

## 2020-12-29 ENCOUNTER — TELEPHONE (OUTPATIENT)
Dept: ONCOLOGY | Age: 70
End: 2020-12-29

## 2020-12-29 NOTE — TELEPHONE ENCOUNTER
Called patients son, Valentino Serrano, HIPAA verified. Was unable to reach patient by phone, and VM is full. Left Message to have patient call the office. Mr Kesha Ruelas will get in touch with his mother.

## 2020-12-29 NOTE — TELEPHONE ENCOUNTER
Patient returned call, HIPAA verified. Notified patient of results of DEXA and CT Scan. Patient verbalized understanding and thanked for call.

## 2020-12-29 NOTE — TELEPHONE ENCOUNTER
----- Message from Camila Layne MD sent at 12/28/2020  6:48 PM EST -----  Please inform no signs of cancer on CT.

## 2020-12-29 NOTE — TELEPHONE ENCOUNTER
----- Message from Cata Sandoval MD sent at 12/23/2020  2:12 PM EST -----  Dexa shows bone loss--need to continue the bone agent--please inform

## 2021-01-03 DIAGNOSIS — C50.919 MALIGNANT NEOPLASM OF BREAST IN FEMALE, ESTROGEN RECEPTOR POSITIVE, UNSPECIFIED LATERALITY, UNSPECIFIED SITE OF BREAST (HCC): ICD-10-CM

## 2021-01-03 DIAGNOSIS — Z17.0 MALIGNANT NEOPLASM OF BREAST IN FEMALE, ESTROGEN RECEPTOR POSITIVE, UNSPECIFIED LATERALITY, UNSPECIFIED SITE OF BREAST (HCC): ICD-10-CM

## 2021-01-03 DIAGNOSIS — Z17.0 MALIGNANT NEOPLASM OF AXILLARY TAIL OF LEFT BREAST IN FEMALE, ESTROGEN RECEPTOR POSITIVE (HCC): ICD-10-CM

## 2021-01-03 DIAGNOSIS — C50.612 MALIGNANT NEOPLASM OF AXILLARY TAIL OF LEFT BREAST IN FEMALE, ESTROGEN RECEPTOR POSITIVE (HCC): ICD-10-CM

## 2021-01-15 ENCOUNTER — TELEPHONE (OUTPATIENT)
Dept: ONCOLOGY | Age: 71
End: 2021-01-15

## 2021-01-18 RX ORDER — LAMOTRIGINE 25 MG/1
500 TABLET ORAL ONCE
Status: CANCELLED | OUTPATIENT
Start: 2021-01-20 | End: 2021-01-20

## 2021-02-22 ENCOUNTER — OFFICE VISIT (OUTPATIENT)
Dept: CARDIOLOGY CLINIC | Age: 71
End: 2021-02-22
Payer: MEDICARE

## 2021-02-22 VITALS
RESPIRATION RATE: 16 BRPM | TEMPERATURE: 97.5 F | HEART RATE: 77 BPM | BODY MASS INDEX: 30.96 KG/M2 | HEIGHT: 61 IN | OXYGEN SATURATION: 97 % | WEIGHT: 164 LBS | DIASTOLIC BLOOD PRESSURE: 80 MMHG | SYSTOLIC BLOOD PRESSURE: 136 MMHG

## 2021-02-22 DIAGNOSIS — I10 ESSENTIAL HYPERTENSION, BENIGN: Primary | ICD-10-CM

## 2021-02-22 DIAGNOSIS — Z90.49 S/P LEFT HEMICOLECTOMY: ICD-10-CM

## 2021-02-22 DIAGNOSIS — Z90.13 S/P BILATERAL MASTECTOMY: ICD-10-CM

## 2021-02-22 DIAGNOSIS — E78.5 DYSLIPIDEMIA (HIGH LDL; LOW HDL): ICD-10-CM

## 2021-02-22 DIAGNOSIS — C50.912 MALIGNANT NEOPLASM OF LEFT BREAST IN FEMALE, ESTROGEN RECEPTOR POSITIVE, UNSPECIFIED SITE OF BREAST (HCC): ICD-10-CM

## 2021-02-22 DIAGNOSIS — Z17.0 MALIGNANT NEOPLASM OF LEFT BREAST IN FEMALE, ESTROGEN RECEPTOR POSITIVE, UNSPECIFIED SITE OF BREAST (HCC): ICD-10-CM

## 2021-02-22 PROCEDURE — G8536 NO DOC ELDER MAL SCRN: HCPCS | Performed by: INTERNAL MEDICINE

## 2021-02-22 PROCEDURE — G8417 CALC BMI ABV UP PARAM F/U: HCPCS | Performed by: INTERNAL MEDICINE

## 2021-02-22 PROCEDURE — G8510 SCR DEP NEG, NO PLAN REQD: HCPCS | Performed by: INTERNAL MEDICINE

## 2021-02-22 PROCEDURE — 1100F PTFALLS ASSESS-DOCD GE2>/YR: CPT | Performed by: INTERNAL MEDICINE

## 2021-02-22 PROCEDURE — G9711 PT HX TOT COL OR COLON CA: HCPCS | Performed by: INTERNAL MEDICINE

## 2021-02-22 PROCEDURE — G8752 SYS BP LESS 140: HCPCS | Performed by: INTERNAL MEDICINE

## 2021-02-22 PROCEDURE — G8427 DOCREV CUR MEDS BY ELIG CLIN: HCPCS | Performed by: INTERNAL MEDICINE

## 2021-02-22 PROCEDURE — 1090F PRES/ABSN URINE INCON ASSESS: CPT | Performed by: INTERNAL MEDICINE

## 2021-02-22 PROCEDURE — G8754 DIAS BP LESS 90: HCPCS | Performed by: INTERNAL MEDICINE

## 2021-02-22 PROCEDURE — 3288F FALL RISK ASSESSMENT DOCD: CPT | Performed by: INTERNAL MEDICINE

## 2021-02-22 PROCEDURE — 99214 OFFICE O/P EST MOD 30 MIN: CPT | Performed by: INTERNAL MEDICINE

## 2021-02-22 NOTE — PROGRESS NOTES
Chief Complaint   Patient presents with    Hypertension     6 mth follow up     Cholesterol Problem     Verified patient with two patient identifiers. Medications reviewed/approved by Dr. Aixa Monreal. 1. Have you been to the ER, urgent care clinic since your last visit? Hospitalized since your last visit? no    2. Have you seen or consulted any other health care providers outside of the [address] [place] [address] since your last visit? Include any pap smears or colon screening.  no

## 2021-02-22 NOTE — PROGRESS NOTES
Domi Green is a 65 y.o. female is here for routine f/u. Vinay Confer Hx hypertension, dyslipidemia, colon ca (s/p hemicolectomy), breast ca--s/p bilat mastectomies, XRT/Chemo; No known cardiac hx. Episode of syncope after spell of abdominal pain, N/V, diarrhea, cramping and got up from commode--passed out. Initial ED w/u neg--told likely vasovagal. One episode 30-40 yrs ago. Seen by Dr. Carlo Umaña testing: ECHO 10/6/16--normal LV size/walls, LVEF 65-70, mild MR, RSVP 42. Carotid dopplers 51/8/80--0-24% DIONY, 18-57% LICA, bilat antegrade vertebrals. Holter monitor 10/16 with NSR, PAC's, one 3 beat PAT run. Had some issues with LE edema, improved after reduced Na. Over 2018 summer had confusion/weakness--prob TIA, at [facility] with neg CV w/u. Echo 7/18 with LVEF 35-75, grade I diastolic, valves ok.   No CV sx or complaints currently other than occ palpitations, vague discomfort related to stress/anxiety--non-exertional..  Continues to see PCP and Oncology with recent OV. Last ECHO 8/26/20 with LVEF 48%, grade I diastolic, valves ok. Labs 12/8/20 with CBC normal, CMP normal except glc 119, chol 196, , HDL 53, TG 90, HbA1c 6.4. The patient denies chest pain/ shortness of breath, orthopnea, PND, LE edema, palpitations, syncope, presyncope or fatigue.        Patient Active Problem List    Diagnosis Date Noted    Postmenopausal osteoporosis 11/15/2019    Osteopenia due to cancer therapy 06/15/2017    Vasovagal syncope 10/11/2016    Left kidney mass 06/08/2016    Rash of neck 01/12/2016    Use of exemestane (Aromasin) 09/15/2015    S/P left hemicolectomy 11/11/2010    S/P bilateral mastectomy 10/18/2010    Essential hypertension, benign 10/12/2010    Dyslipidemia (high LDL; low HDL) 10/12/2010    S/P left hemicolectomy 10/12/2010    Family history of coronary artery disease 10/12/2010    Malignant neoplasm of female breast ([facility] [address] 09/02/2010      Suze Wen MD  Past Medical History: Diagnosis Date    Arthritis     Breast CA ([facility] [address]     left    Cancer ([facility] [address] 1998    colon cancer    Dyslipidemia (high LDL; low HDL) 10/12/2010    Essential hypertension, benign 10/12/2010    Family history of coronary artery disease 10/12/2010    FH: bowel obstruction 2015    Hypercholesteremia     Hypertension     Ill-defined condition     elevated cholesterol    Postmenopausal     S/P chemotherapy, time since greater than 12 weeks     S/P left hemicolectomy 10/12/2010      Past Surgical History:   Procedure Laterality Date    HX APPENDECTOMY      HX COLONOSCOPY      HX DILATION AND CURETTAGE      HX GYN      uterine ablation    HX OOPHORECTOMY      part of left ovary removed    HX OTHER SURGICAL      port a cath    HX SALPINGO-OOPHORECTOMY      HX TOTAL COLECTOMY      [place] BREAST SURGERY PROCEDURE UNLISTED      double mastectomy     No Known Allergies   Family History   Problem Relation Age of Onset    Cancer Father 79        colon    Hypertension Mother    [facility] Arthritis-rheumatoid Mother     Elevated Lipids Mother     No Known Problems Sister     Elevated Lipids Brother     Hypertension Brother     Hypertension Sister     Elevated Lipids Sister     No Known Problems Brother     Heart Disease Brother       Social History     Socioeconomic History    Marital status:      Spouse name: Not on file    Number of children: Not on file    Years of education: Not on file    Highest education level: Not on file   Occupational History    Not on file   Social Needs    Financial resource strain: Not on file    Food insecurity     Worry: Not on file     Inability: Not on file    Transportation needs     Medical: Not on file     Non-medical: Not on file   Tobacco Use    Smoking status: Former Smoker     Packs/day: 0.50     Years: 10.00     Pack years: 5.00     Quit date: 1980     Years since quittin.1    Smokeless tobacco: Never Used    Tobacco comment: quit 29 years ago   Substance and Sexual Activity    Alcohol use: No    Drug use: No    Sexual activity: Not on file     Comment: menses age 13, menopause 44 age, first birth 32,    Lifestyle    Physical activity     Days per week: Not on file     Minutes per session: Not on file    Stress: Not on file   Relationships    Social connections     Talks on phone: Not on file     Gets together: Not on file     Attends Church service: Not on file     Active member of club or organization: Not on file     Attends meetings of clubs or organizations: Not on file     Relationship status: Not on file    Intimate partner violence     Fear of current or ex partner: Not on file     Emotionally abused: Not on file     Physically abused: Not on file     Forced sexual activity: Not on file   Other Topics Concern    Not on file   Social History Narrative    Not on file      Current Outpatient Medications   Medication Sig    atorvastatin (LIPITOR) 20 mg tablet Take 60 mg by mouth daily. Take 20 mg tab along with 40 mg tab for total daily dose of 60 mg per PCP    ezetimibe (ZETIA) 10 mg tablet Take 1 Tab by mouth daily.  aspirin delayed-release 81 mg tablet Take 1 Tab by mouth daily.  fulvestrant (FASLODEX IM) 500 mg by IntraMUSCular route every twenty-eight (28) days.  metFORMIN ER (GLUCOPHAGE XR) 500 mg tablet Take 1 Tab by mouth daily.  CALCIUM CARBONATE/VITAMIN D3 (CALTRATE 600 + D PO) Take  by mouth.  Denosumab (PROLIA) 60 mg/mL injection 60 mg by SubCUTAneous route. Every 6 months   Indications: POST-MENOPAUSAL OSTEOPOROSIS    magnesium oxide (MAG-OX) 400 mg tablet Take 400 mg by mouth daily.  atorvastatin (LIPITOR) 40 mg tablet Take 40 mg by mouth daily. Takes 60 mg daily    amlodipine (NORVASC) 10 mg tablet Take 10 mg by mouth daily. Indications: HYPERTENSION    multivitamins-minerals-lutein (CENTRUM SILVER) Tab Take  by mouth daily.      No current facility-administered medications for this visit. Review of Symptoms:    CONST  No weight change. No fever, chills, sweats    ENT No visual changes, URI sx, sore throat    CV  See HPI   RESP  No cough, or sputum, wheezing. Also see HPI   GI  No abdominal pain or change in bowel habits. No heartburn or dysphagia. No melena or rectal bleeding.   No dysuria, urgency, frequency, hematuria   MSKEL  No joint pain, swelling. No muscle pain. SKIN  No rash or lesions. NEURO  No headache, syncope, or seizure. No weakness, loss of sensation, or paresthesias. PSYCH  No low mood or depression  No anxiety. HE/LYMPH  No easy bruising, abnormal bleeding, or enlarged glands. Physical ExamPhysical Exam:    Visit Vitals  /80 (BP 1 Location: Right arm, BP Patient Position: Sitting, BP Cuff Size: Adult)   Pulse 77   Temp 97.5 °F (36.4 °C) (Temporal)   Resp 16   Ht 5' 1\" (1.549 m)   Wt 164 lb (74.4 kg)   SpO2 97%   BMI 30.99 kg/m²     Gen: NAD  HEENT:  PERRL, throat clear  Neck: no adenopathy, no thyromegaly, no JVD   Heart:  Regular,Nl S1S2,  no murmur, gallop or rub. Lungs:  clear  Abdomen:   Soft, non-tender, bowel sounds are active.    Extremities:  No edema  Pulse: symmetric  Neuro: A&O times 3, No focal neuro deficits      Labs:   Lab Results   Component Value Date/Time    Sodium 140 12/03/2020 09:52 AM    Sodium 140 05/08/2020 09:46 AM    Sodium 142 11/21/2019 09:38 AM    Sodium 142 05/15/2019 09:23 AM    Sodium 141 11/14/2018 09:30 AM    Potassium 3.6 12/03/2020 09:52 AM    Potassium 4.1 05/08/2020 09:46 AM    Potassium 4.2 11/21/2019 09:38 AM    Potassium 3.5 05/15/2019 09:23 AM    Potassium 4.0 11/14/2018 09:30 AM    Chloride 102 12/03/2020 09:52 AM    Chloride 104 05/08/2020 09:46 AM    Chloride 102 11/21/2019 09:38 AM    Chloride 103 05/15/2019 09:23 AM    Chloride 104 11/14/2018 09:30 AM    CO2 24 12/03/2020 09:52 AM    CO2 28 05/08/2020 09:46 AM    CO2 30 11/21/2019 09:38 AM    CO2 27 05/15/2019 09:23 AM    CO2 27 11/14/2018 09:30 AM    Anion gap 14 12/03/2020 09:52 AM    Anion gap 8 05/08/2020 09:46 AM    Anion gap 10 11/21/2019 09:38 AM    Anion gap 12 05/15/2019 09:23 AM    Anion gap 10 11/14/2018 09:30 AM    Glucose 126 (H) 12/03/2020 09:52 AM    Glucose 127 (H) 05/08/2020 09:46 AM    Glucose 128 (H) 11/21/2019 09:38 AM    Glucose 107 (H) 05/15/2019 09:23 AM    Glucose 124 (H) 11/14/2018 09:30 AM    BUN 13 12/03/2020 09:52 AM    BUN 13 05/08/2020 09:46 AM    BUN 13 11/21/2019 09:38 AM    BUN 17 05/15/2019 09:23 AM    BUN 16 11/14/2018 09:30 AM    Creatinine 0.84 12/03/2020 09:52 AM    Creatinine 0.79 05/08/2020 09:46 AM    Creatinine 0.83 11/21/2019 09:38 AM    Creatinine 0.83 05/15/2019 09:23 AM    Creatinine 0.83 11/14/2018 09:30 AM    BUN/Creatinine ratio 15 12/03/2020 09:52 AM    BUN/Creatinine ratio 16 05/08/2020 09:46 AM    BUN/Creatinine ratio 16 11/21/2019 09:38 AM    BUN/Creatinine ratio 20 05/15/2019 09:23 AM    BUN/Creatinine ratio 19 11/14/2018 09:30 AM    GFR est AA >60 12/03/2020 09:52 AM    GFR est AA >60 05/08/2020 09:46 AM    GFR est AA >60 11/21/2019 09:38 AM    GFR est AA >60 05/15/2019 09:23 AM    GFR est AA >60 11/14/2018 09:30 AM    GFR est non-AA >60 12/03/2020 09:52 AM    GFR est non-AA >60 05/08/2020 09:46 AM    GFR est non-AA >60 11/21/2019 09:38 AM    GFR est non-AA >60 05/15/2019 09:23 AM    GFR est non-AA >60 11/14/2018 09:30 AM    Calcium 9.4 12/03/2020 09:52 AM    Calcium 9.0 05/08/2020 09:46 AM    Calcium 9.4 11/21/2019 09:38 AM    Calcium 9.6 05/15/2019 09:23 AM    Calcium 9.1 11/14/2018 09:30 AM    Bilirubin, total 0.5 05/22/2019 09:50 AM    Bilirubin, total 0.4 11/14/2018 09:30 AM    Bilirubin, total 0.5 10/19/2017 09:27 AM    Bilirubin, total 0.5 06/15/2017 11:15 AM    Bilirubin, total 0.4 10/15/2010 02:00 PM    Alk. phosphatase 104 05/22/2019 09:50 AM    Alk. phosphatase 78 11/14/2018 09:30 AM    Alk. phosphatase 80 10/19/2017 09:27 AM    Alk.  phosphatase 77 06/15/2017 11:15 AM    Alk. phosphatase 142 (H) 10/15/2010 02:00 PM    Protein, total 7.5 05/22/2019 09:50 AM    Protein, total 7.8 11/14/2018 09:30 AM    Protein, total 8.5 (H) 10/19/2017 09:27 AM    Protein, total 8.1 06/15/2017 11:15 AM    Protein, total 7.5 10/15/2010 02:00 PM    Albumin 4.5 12/03/2020 09:52 AM    Albumin 4.4 05/08/2020 09:46 AM    Albumin 4.3 11/21/2019 09:38 AM    Albumin 4.1 05/22/2019 09:50 AM    Albumin 4.3 05/15/2019 09:23 AM    Globulin 3.4 05/22/2019 09:50 AM    Globulin 3.5 11/14/2018 09:30 AM    Globulin 3.9 10/19/2017 09:27 AM    Globulin 3.8 06/15/2017 11:15 AM    Globulin 3.5 10/15/2010 02:00 PM    A-G Ratio 1.2 05/22/2019 09:50 AM    A-G Ratio 1.2 11/14/2018 09:30 AM    A-G Ratio 1.2 10/19/2017 09:27 AM    A-G Ratio 1.1 06/15/2017 11:15 AM    A-G Ratio 1.1 10/15/2010 02:00 PM    ALT (SGPT) 34 05/22/2019 09:50 AM    ALT (SGPT) 26 11/14/2018 09:30 AM    ALT (SGPT) 35 10/19/2017 09:27 AM    ALT (SGPT) 30 06/15/2017 11:15 AM    ALT (SGPT) 33 10/15/2010 02:00 PM     No results found for: CPK, CPKX, CPX  No results found for: CHOL, CHOLX, CHLST, CHOLV, 072936, HDL, HDLP, LDL, LDLC, DLDLP, TGLX, TRIGL, TRIGP, CHHD, CHHDX  No results found for this or any previous visit. Assessment:         Patient Active Problem List    Diagnosis Date Noted    Postmenopausal osteoporosis 11/15/2019    Osteopenia due to cancer therapy 06/15/2017    Vasovagal syncope 10/11/2016    Left kidney mass 06/08/2016    Rash of neck 01/12/2016    Use of exemestane (Aromasin) 09/15/2015    S/P left hemicolectomy 11/11/2010    S/P bilateral mastectomy 10/18/2010    Essential hypertension, benign 10/12/2010    Dyslipidemia (high LDL; low HDL) 10/12/2010    S/P left hemicolectomy 10/12/2010    Family history of coronary artery disease 10/12/2010    Malignant neoplasm of female breast ([facility] [address] 09/02/2010     . Hx hypertension, dyslipidemia, colon ca (s/p hemicolectomy), breast ca--s/p bilat mastectomies, XRT/Chemo; No known cardiac hx. Episode of syncope after spell of abdominal pain, N/V, diarrhea, cramping and got up from commode--passed out. Initial ED w/u neg--told likely vasovagal. One episode 30-40 yrs ago. Seen by Dr. Alex Razo testing: ECHO 10/6/16--normal LV size/walls, LVEF 65-70, mild MR, RSVP 42. Carotid dopplers 92/9/82--1-16% DIONY, 01-58% LICA, bilat antegrade vertebrals. Holter monitor 10/16 with NSR, PAC's, one 3 beat PAT run. Had some issues with LE edema, improved after reduced Na. Over 2018 summer had confusion/weakness--prob TIA, at [facility] with neg CV w/u. Echo 7/18 with LVEF 08-62, grade I diastolic, valves ok.   No CV sx or complaints currently other than occ palpitations, vague discomfort related to stress/anxiety--non-exertional..  Continues to see PCP and Oncology with recent OV. Last ECHO 8/26/20 with LVEF 85%, grade I diastolic, valves ok. Labs 12/8/20 with CBC normal, CMP normal except glc 119, chol 196, , HDL 53, TG 90, HbA1c 6.4. Plan:     Doing well with no adverse cardiac symptoms. Lipids and labs followed by PCP. Continue current care and f/u in 6 months.     Nelia Pedroza MD

## 2021-02-22 NOTE — LETTER
2/22/2021 Patient: Christiano Wilson YOB: 1950 Date of Visit: 2/22/2021 Luiz Pang MD 
[facility] [phone] [id number] Via Fax: [phone] Dear Luiz Pang MD, Thank you for referring Ms. Christopher Mancia to [facility] for evaluation. My notes for this consultation are attached. If you have questions, please do not hesitate to call me. I look forward to following your patient along with you.  
 
 
Sincerely, 
 
Sunny Arteaga MD

## 2021-06-04 ENCOUNTER — HOSPITAL ENCOUNTER (OUTPATIENT)
Dept: INFUSION THERAPY | Age: 71
Discharge: HOME | End: 2021-06-04
Payer: MEDICARE

## 2021-06-04 ENCOUNTER — OFFICE VISIT (OUTPATIENT)
Dept: ONCOLOGY | Age: 71
End: 2021-06-04
Payer: MEDICARE

## 2021-06-04 VITALS
BODY MASS INDEX: 31.12 KG/M2 | WEIGHT: 164.68 LBS | HEART RATE: 69 BPM | TEMPERATURE: 98.2 F | RESPIRATION RATE: 16 BRPM | SYSTOLIC BLOOD PRESSURE: 138 MMHG | OXYGEN SATURATION: 99 % | DIASTOLIC BLOOD PRESSURE: 81 MMHG

## 2021-06-04 VITALS
BODY MASS INDEX: 31.14 KG/M2 | TEMPERATURE: 98.2 F | RESPIRATION RATE: 16 BRPM | DIASTOLIC BLOOD PRESSURE: 81 MMHG | OXYGEN SATURATION: 99 % | SYSTOLIC BLOOD PRESSURE: 138 MMHG | HEART RATE: 69 BPM | WEIGHT: 164.8 LBS

## 2021-06-04 DIAGNOSIS — M85.80 OSTEOPENIA DUE TO CANCER THERAPY: Primary | ICD-10-CM

## 2021-06-04 DIAGNOSIS — C50.912 MALIGNANT NEOPLASM OF LEFT BREAST IN FEMALE, ESTROGEN RECEPTOR POSITIVE, UNSPECIFIED SITE OF BREAST (HCC): Primary | ICD-10-CM

## 2021-06-04 DIAGNOSIS — Z17.0 MALIGNANT NEOPLASM OF LEFT BREAST IN FEMALE, ESTROGEN RECEPTOR POSITIVE, UNSPECIFIED SITE OF BREAST (HCC): Primary | ICD-10-CM

## 2021-06-04 DIAGNOSIS — Z90.13 S/P BILATERAL MASTECTOMY: ICD-10-CM

## 2021-06-04 LAB
ALBUMIN SERPL-MCNC: 4.2 G/DL (ref 3.5–5)
ANION GAP SERPL CALC-SCNC: 9 MMOL/L (ref 5–15)
BUN SERPL-MCNC: 13 MG/DL (ref 6–20)
BUN/CREAT SERPL: 16 (ref 12–20)
CALCIUM SERPL-MCNC: 9.5 MG/DL (ref 8.5–10.1)
CHLORIDE SERPL-SCNC: 105 MMOL/L (ref 97–108)
CO2 SERPL-SCNC: 28 MMOL/L (ref 21–32)
CREAT SERPL-MCNC: 0.79 MG/DL (ref 0.55–1.02)
GLUCOSE SERPL-MCNC: 111 MG/DL (ref 65–100)
MAGNESIUM SERPL-MCNC: 2 MG/DL (ref 1.6–2.4)
PHOSPHATE SERPL-MCNC: 3.4 MG/DL (ref 2.6–4.7)
POTASSIUM SERPL-SCNC: 4 MMOL/L (ref 3.5–5.1)
SODIUM SERPL-SCNC: 142 MMOL/L (ref 136–145)

## 2021-06-04 PROCEDURE — 74011250636 HC RX REV CODE- 250/636: Performed by: INTERNAL MEDICINE

## 2021-06-04 PROCEDURE — 80069 RENAL FUNCTION PANEL: CPT

## 2021-06-04 PROCEDURE — 3288F FALL RISK ASSESSMENT DOCD: CPT | Performed by: INTERNAL MEDICINE

## 2021-06-04 PROCEDURE — G8536 NO DOC ELDER MAL SCRN: HCPCS | Performed by: INTERNAL MEDICINE

## 2021-06-04 PROCEDURE — 99213 OFFICE O/P EST LOW 20 MIN: CPT | Performed by: INTERNAL MEDICINE

## 2021-06-04 PROCEDURE — G9711 PT HX TOT COL OR COLON CA: HCPCS | Performed by: INTERNAL MEDICINE

## 2021-06-04 PROCEDURE — G8417 CALC BMI ABV UP PARAM F/U: HCPCS | Performed by: INTERNAL MEDICINE

## 2021-06-04 PROCEDURE — G8754 DIAS BP LESS 90: HCPCS | Performed by: INTERNAL MEDICINE

## 2021-06-04 PROCEDURE — 1100F PTFALLS ASSESS-DOCD GE2>/YR: CPT | Performed by: INTERNAL MEDICINE

## 2021-06-04 PROCEDURE — 1090F PRES/ABSN URINE INCON ASSESS: CPT | Performed by: INTERNAL MEDICINE

## 2021-06-04 PROCEDURE — G8752 SYS BP LESS 140: HCPCS | Performed by: INTERNAL MEDICINE

## 2021-06-04 PROCEDURE — G8427 DOCREV CUR MEDS BY ELIG CLIN: HCPCS | Performed by: INTERNAL MEDICINE

## 2021-06-04 PROCEDURE — 36415 COLL VENOUS BLD VENIPUNCTURE: CPT

## 2021-06-04 PROCEDURE — 96372 THER/PROPH/DIAG INJ SC/IM: CPT

## 2021-06-04 PROCEDURE — 83735 ASSAY OF MAGNESIUM: CPT

## 2021-06-04 PROCEDURE — G8510 SCR DEP NEG, NO PLAN REQD: HCPCS | Performed by: INTERNAL MEDICINE

## 2021-06-04 RX ADMIN — DENOSUMAB 60 MG: 60 INJECTION SUBCUTANEOUS at 10:34

## 2021-06-04 NOTE — PROGRESS NOTES
Laychristopher Benton a 70 y. o. female who is seen for follow up of Breast and Colon Cancer. Patient states she is feeling feeling fairly well. Patient has experienced pain in the l breast area when she had the mastectomy. She has a cyst type area on R wrist and an area inside her L elbow which she is concerned about. Last Faslodex injection was 2/2021. ... Key Oncology Meds             fulvestrant (FASLODEX IM) 500 mg by IntraMUSCular route every twenty-eight (28) days. Key Pain Meds     The patient is on no pain meds.         Chemotherapy Flowsheet 2/17/2021   Cycle -   Date 2/17/2021   Drug / Regimen Faslodex   Dosage 500 mg    Time Up Given   Notes 250 mg in right and 250 in left GM

## 2021-06-04 NOTE — PROGRESS NOTES
Reason for Visit:   Samuella Holstein a 38 Y. o. female who is seen for follow up of Breast and Colon Cancer     Treatment History:   Dx:  Stage IIA T2N0M0 invasive ductal carcinoma of left breast underwent MRM of left breast in 09/2010 and histopathology was suggestive of poorly differentiated infiltrating ductal carcinoma, 5 cm size and 0/5 sentinel LNs involved, ER & [place] positive Her 2 negative. She underwent oncotype Dx with recurrence score of 32 suggestive of high risk (21%) of distant recurrence. She then completed 4 cycles of adjuvant chemotherapy with docetaxel + cytoxan in 06/2011 and adjuvant radiation therapy until 01/2011. She was initially treated with Arimidex until 07/2012 but was dicontinued because she had osteoporosis and couldn't tolerate Fosamax. She was switched to Tamoxifen which was continued until 01/2015. Tamoxifen was discontinued due to findings of endometrial polyp and proliferative endothelium requiring D&C and eventually hysterectomy. She was then switched to Exemestane (Feb 2015--April 2019)--stopped due to prohibitive cost.   Current Tx: Fulvestrant since May 2019 and has been on prophylaxis with Prolia every 6 months for osteoporosis. Stop hormonal in March 2021     Dx:  Colon cancer s/p hemicolectomy in 1998. She was apparently started on adjuvant chemotherapy but couldn't tolerate and hence discontinued. Her last colonoscopy was on 12/11/2015 which showed hemorroids. Current Tx: Observation     Goal for both is cure--will see me every 6 months    History of Present Illness:   Doing ok, some swelling in the left medial elbow area.     Past Medical History:   Diagnosis Date    Arthritis     Breast CA ([address]     left    Cancer ([address] 1998    colon cancer    Dyslipidemia (high LDL; low HDL) 10/12/2010    Essential hypertension, benign 10/12/2010    Family history of coronary artery disease 10/12/2010    FH: bowel obstruction 9/1/2015    Hypercholesteremia     Hypertension     Ill-defined condition     elevated cholesterol    Postmenopausal     S/P chemotherapy, time since greater than 12 weeks     S/P left hemicolectomy 10/12/2010      Past Surgical History:   Procedure Laterality Date    HX APPENDECTOMY      HX COLONOSCOPY      HX DILATION AND CURETTAGE      HX GYN  2000    uterine ablation    HX OOPHORECTOMY      part of left ovary removed    HX OTHER SURGICAL  2011    port a cath    HX SALPINGO-OOPHORECTOMY      HX TOTAL COLECTOMY      [place] BREAST SURGERY PROCEDURE UNLISTED      double mastectomy      Social History     Tobacco Use    Smoking status: Former Smoker     Packs/day: 0.50     Years: 10.00     Pack years: 5.00     Quit date: 1980     Years since quittin.4    Smokeless tobacco: Never Used    Tobacco comment: quit 29 years ago   Substance Use Topics    Alcohol use: No      Family History   Problem Relation Age of Onset    Cancer Father 79        colon    Hypertension Mother     Arthritis-rheumatoid Mother     Elevated Lipids Mother     No Known Problems Sister     Elevated Lipids Brother     Hypertension Brother     Hypertension Sister     Elevated Lipids Sister     No Known Problems Brother     Heart Disease Brother      Current Outpatient Medications   Medication Sig    aspirin delayed-release 81 mg tablet Take 1 Tab by mouth daily.  metFORMIN ER (GLUCOPHAGE XR) 500 mg tablet Take 1 Tab by mouth daily.  CALCIUM CARBONATE/VITAMIN D3 (CALTRATE 600 + D PO) Take 2 Caplet by mouth daily.  Denosumab (PROLIA) 60 mg/mL injection 60 mg by SubCUTAneous route. Every 6 months   Indications: POST-MENOPAUSAL OSTEOPOROSIS    magnesium oxide (MAG-OX) 400 mg tablet Take 400 mg by mouth daily.  atorvastatin (LIPITOR) 40 mg tablet Take 40 mg by mouth daily. Takes 60 mg daily    amlodipine (NORVASC) 10 mg tablet Take 10 mg by mouth daily.  Indications: HYPERTENSION    multivitamins-minerals-lutein (CENTRUM SILVER) Tab Take  by mouth daily.  fulvestrant (FASLODEX IM) 500 mg by IntraMUSCular route every twenty-eight (28) days. (Patient not taking: Reported on 6/4/2021)     No current facility-administered medications for this visit. No Known Allergies     Review of Systems: A complete review of systems was obtained, negative except as described above. Physical Exam:     Visit Vitals  /81   Pulse 69   Temp 98.2 °F (36.8 °C) (Oral)   Resp 16   Wt 164 lb 12.8 oz (74.8 kg)   SpO2 99%   BMI 31.14 kg/m²     ECOG PS: 0  General: No distress  Eyes: PERRLA, anicteric sclerae  HENT: Atraumatic, OP clear  Neck: Supple  Lymphatic: No cervical, supraclavicular, or inguinal adenopathy  Respiratory: CTAB, normal respiratory effort  CV: Normal rate, regular rhythm, no murmurs, no peripheral edema  GI: Soft, nontender, nondistended, no masses, no hepatomegaly, no splenomegaly  MS: Normal gait and station. Digits without clubbing or cyanosis. Skin: No rashes, ecchymoses, or petechiae. Normal temperature, turgor, and texture. Psych: Alert, oriented, appropriate affect, normal judgment/insight    Results:     Lab Results   Component Value Date/Time    WBC 4.5 11/14/2018 09:30 AM    HGB 13.7 11/14/2018 09:30 AM    HCT 43.2 11/14/2018 09:30 AM    PLATELET 655 66/78/7042 09:30 AM    MCV 87.8 11/14/2018 09:30 AM    ABS. NEUTROPHILS 2.1 11/14/2018 09:30 AM     Lab Results   Component Value Date/Time    Sodium 140 12/03/2020 09:52 AM    Potassium 3.6 12/03/2020 09:52 AM    Chloride 102 12/03/2020 09:52 AM    CO2 24 12/03/2020 09:52 AM    Glucose 126 (H) 12/03/2020 09:52 AM    BUN 13 12/03/2020 09:52 AM    Creatinine 0.84 12/03/2020 09:52 AM    GFR est AA >60 12/03/2020 09:52 AM    GFR est non-AA >60 12/03/2020 09:52 AM    Calcium 9.4 12/03/2020 09:52 AM    Creatinine (POC) 0.8 12/12/2016 10:02 AM     Lab Results   Component Value Date/Time    Bilirubin, total 0.5 05/22/2019 09:50 AM    ALT (SGPT) 34 05/22/2019 09:50 AM    Alk. phosphatase 104 05/22/2019 09:50 AM    Protein, total 7.5 05/22/2019 09:50 AM    Albumin 4.5 12/03/2020 09:52 AM    Globulin 3.4 05/22/2019 09:50 AM       PET 11/2/2017  IMPRESSION: No Foci of Abnormal Hypermetabolism. There is a 6 mm nodule right  lower lobe which does not demonstrate increased metabolic activity. This was  present on a CT dated 6/21/2016.     Dexa 7/11/2017  IMPRESSION:  Osteopenia.     Bone mineral density of the total left hip has increased 0.3% since the priorexamination. Bone mineral density of the total right hip has increased 4.4% since the priorexamination. Bone mineral density of the total lumbar spine has increased 4.7% since theprior examination.     Assessment:   1) Breast Cancer ER+HER2-  2) Colon Cancer  3) Osteoporosis/Osteopenia  4) Chest pain/spasm  5) Left elbow/arm swelling     Plan:   1. Stage IIA invasive ductal carcinoma of right breast, ER & LA positive Her 2 negative, high Oncotype:     - She was initially diagnosed in 09/2010 and completed her treatments as of 01/2011. - She was treated with Arimidex for 1 year and was switched to Tamoxifen for 2.5 years until 01/2015. Then Aromasin, and completed with Faslodex. - Completed Faslodex Feb 2021  - She underwent bilateral mastectomies and hence no role for screening mammograms.  - She will return to the clinic in 6 months      2. History of Colon cancer s/p hemicolectomy and was unable to tolerate adjuvant chemo in 1998:     - Her last colonscopy was on 12/11/2015 for rectal bleeding and was found to be secondary to hemorroids. .   - Advised her to continue her 5 yearly surveillance colonoscopies.     3. Osteoporosis/ Osteopenia:     - Last Dexa scan was done 07/2017and was suggestive of Osteopenia. She is due for a repeat. - Her last Prolia was in 05/2020.   - Advised her to continue oral calcium + vitamin D supplements.     4.  Family History of Colon Cancer, Breast Cancer, and Endometrial Cancer     - Referred to [facility] for eval of BRCA and HNPCC--July 2019,  Was negative for BRCA 1/2, get results from [address]    5. Chest pain/spasm    - Chest CT, had ECHO with cardiology and told normal    6. Left arm/elbow swelling    - Discussed eval for lymphedema--would be minimal or mild I suspect--she will consider and let us know. Elevate arm as much as possible.       Signed By: Norma Bryant MD

## 2021-06-04 NOTE — PROGRESS NOTES
[facility]    Date: 2021    Name: Abby Mora    MRN: [id number]         : 1950      Ms. Sonya French was assessed. Pt was seen in clinic by Dr Carleen Mann today. Ms. Bill Varma vitals were reviewed and patient was observed for 5 minutes prior to treatment. Visit Vitals  /81 (BP 1 Location: Left upper arm, BP Patient Position: Sitting)   Pulse 69   Temp 98.2 °F (36.8 °C)   Resp 16   Wt 74.7 kg (164 lb 10.9 oz)   SpO2 99%   Breastfeeding No   BMI 31.12 kg/m²       Lab results were obtained and reviewed. Recent Results (from the past 12 hour(s))   RENAL FUNCTION PANEL    Collection Time: 21  9:45 AM   Result Value Ref Range    Sodium 142 136 - 145 mmol/L    Potassium 4.0 3.5 - 5.1 mmol/L    Chloride 105 97 - 108 mmol/L    CO2 28 21 - 32 mmol/L    Anion gap 9 5 - 15 mmol/L    Glucose 111 (H) 65 - 100 mg/dL    BUN 13 6 - 20 MG/DL    Creatinine 0.79 0.55 - 1.02 MG/DL    BUN/Creatinine ratio 16 12 - 20      GFR est AA >60 >60 ml/min/1.73m2    GFR est non-AA >60 >60 ml/min/1.73m2    Calcium 9.5 8.5 - 10.1 MG/DL    Phosphorus 3.4 2.6 - 4.7 MG/DL    Albumin 4.2 3.5 - 5.0 g/dL   MAGNESIUM    Collection Time: 21  9:45 AM   Result Value Ref Range    Magnesium 2.0 1.6 - 2.4 mg/dL       Prolia was administered subcutaneous in right arm. Ms. Sonya French tolerated well, and had no complaints. Patient's armband removed and shredded. Ms. Sonya French was discharged from [place] [address] in stable condition at 1035 . She is to return on 12/3 @ 1000 for her next Prolia appointment.     Stefany Augustin RN  2021  1:05 PM

## 2021-06-04 NOTE — LETTER
6/4/2021 Patient: Sabrina Rosales YOB: 1950 Date of Visit: 6/4/2021 Teresa Dandy, MD 
[facility] [address] [id number] Via Fax: [phone] Dear Teresa Dandy, MD, Thank you for referring Ms. Elton Salinas to [facility] for evaluation. My notes for this consultation are attached. If you have questions, please do not hesitate to call me. I look forward to following your patient along with you.  
 
 
Sincerely, 
 
Lulu Brand MD

## 2021-06-10 LAB — HBA1C MFR BLD HPLC: 6.3 %

## 2021-08-23 ENCOUNTER — OFFICE VISIT (OUTPATIENT)
Dept: CARDIOLOGY CLINIC | Age: 71
End: 2021-08-23
Payer: MEDICARE

## 2021-08-23 VITALS
SYSTOLIC BLOOD PRESSURE: 130 MMHG | BODY MASS INDEX: 31.15 KG/M2 | WEIGHT: 165 LBS | TEMPERATURE: 97.6 F | HEART RATE: 76 BPM | DIASTOLIC BLOOD PRESSURE: 76 MMHG | RESPIRATION RATE: 16 BRPM | OXYGEN SATURATION: 98 % | HEIGHT: 61 IN

## 2021-08-23 DIAGNOSIS — I65.23 BILATERAL CAROTID ARTERY STENOSIS: ICD-10-CM

## 2021-08-23 DIAGNOSIS — Z90.13 S/P BILATERAL MASTECTOMY: ICD-10-CM

## 2021-08-23 DIAGNOSIS — Z17.0 MALIGNANT NEOPLASM OF LEFT BREAST IN FEMALE, ESTROGEN RECEPTOR POSITIVE, UNSPECIFIED SITE OF BREAST (HCC): ICD-10-CM

## 2021-08-23 DIAGNOSIS — Z90.49 S/P LEFT HEMICOLECTOMY: ICD-10-CM

## 2021-08-23 DIAGNOSIS — E78.5 DYSLIPIDEMIA (HIGH LDL; LOW HDL): ICD-10-CM

## 2021-08-23 DIAGNOSIS — C50.912 MALIGNANT NEOPLASM OF LEFT BREAST IN FEMALE, ESTROGEN RECEPTOR POSITIVE, UNSPECIFIED SITE OF BREAST (HCC): ICD-10-CM

## 2021-08-23 DIAGNOSIS — I10 ESSENTIAL HYPERTENSION, BENIGN: Primary | ICD-10-CM

## 2021-08-23 PROCEDURE — G8536 NO DOC ELDER MAL SCRN: HCPCS | Performed by: INTERNAL MEDICINE

## 2021-08-23 PROCEDURE — G8417 CALC BMI ABV UP PARAM F/U: HCPCS | Performed by: INTERNAL MEDICINE

## 2021-08-23 PROCEDURE — G8427 DOCREV CUR MEDS BY ELIG CLIN: HCPCS | Performed by: INTERNAL MEDICINE

## 2021-08-23 PROCEDURE — G8754 DIAS BP LESS 90: HCPCS | Performed by: INTERNAL MEDICINE

## 2021-08-23 PROCEDURE — 1100F PTFALLS ASSESS-DOCD GE2>/YR: CPT | Performed by: INTERNAL MEDICINE

## 2021-08-23 PROCEDURE — 1090F PRES/ABSN URINE INCON ASSESS: CPT | Performed by: INTERNAL MEDICINE

## 2021-08-23 PROCEDURE — G8752 SYS BP LESS 140: HCPCS | Performed by: INTERNAL MEDICINE

## 2021-08-23 PROCEDURE — 3288F FALL RISK ASSESSMENT DOCD: CPT | Performed by: INTERNAL MEDICINE

## 2021-08-23 PROCEDURE — G8510 SCR DEP NEG, NO PLAN REQD: HCPCS | Performed by: INTERNAL MEDICINE

## 2021-08-23 PROCEDURE — 99214 OFFICE O/P EST MOD 30 MIN: CPT | Performed by: INTERNAL MEDICINE

## 2021-08-23 PROCEDURE — 93000 ELECTROCARDIOGRAM COMPLETE: CPT | Performed by: INTERNAL MEDICINE

## 2021-08-23 PROCEDURE — G9711 PT HX TOT COL OR COLON CA: HCPCS | Performed by: INTERNAL MEDICINE

## 2021-08-23 NOTE — PROGRESS NOTES
Identified pt with two pt identifiers(name and ). Reviewed record in preparation for visit and have obtained necessary documentation. Chief Complaint   Patient presents with    Hypertension     6 month follow up      Vitals:    21 1024   BP: 130/76   Pulse: 76   Resp: 16   Temp: 97.6 °F (36.4 °C)   TempSrc: Temporal   SpO2: 98%   Weight: 165 lb (74.8 kg)   Height: 5' 1\" (1.549 m)   PainSc:   0 - No pain       Medications reviewed/approved by Dr. Kwasi Morrison. Health Maintenance Review: Patient reminded of \"due or due soon\" health maintenance. I have asked the patient to contact his/her primary care provider (PCP) for follow-up on his/her health maintenance. Coordination of Care Questionnaire:  :   1) Have you been to an emergency room, urgent care, or hospitalized since your last visit? If yes, where when, and reason for visit? no       2. Have seen or consulted any other health care provider since your last visit? If yes, where when, and reason for visit? Yes- pcp Dr. Minerva Lennox - for r/c. Patient is accompanied by self I have received verbal consent from Briseyda Black to discuss any/all medical information while they are present in the room.

## 2021-08-23 NOTE — PROGRESS NOTES
Manjeet Alex is a 65 y.o. female is here for routine f/u. Hx hypertension, dyslipidemia, colon ca (s/p hemicolectomy), breast ca--s/p bilat mastectomies, XRT/Chemo; No known cardiac hx. Episode of syncope after spell of abdominal pain, N/V, diarrhea, cramping and got up from commode--passed out. Initial ED w/u neg--told likely vasovagal. One episode 30-40 yrs ago. Seen by Dr. Jamar Chung testing: ECHO 10/6/16--normal LV size/walls, LVEF 65-70, mild MR, RSVP 42. Carotid dopplers 13/8/80--0-24% DIONY, 72-23% LICA, bilat antegrade vertebrals. Holter monitor 10/16 with NSR, PAC's, one 3 beat PAT run. Had some issues with LE edema, improved after reduced Na. Over 2018 summer had confusion/weakness--prob TIA, at [facility] with neg CV w/u. Echo 7/18 with LVEF 31-32, grade I diastolic, valves ok.   No CV sx or complaints currently other than occ palpitations, vague discomfort related to stress/anxiety--non-exertional..  Continues to see PCP and Oncology with recent OV.  Last ECHO 8/26/20 with LVEF 69%, grade I diastolic, valves ok. Labs 6/9/21 with CBC normal, CMP normal except BUN 23 (Cr 0.8), chol 198, , HDL 56, , HbA1c 6.33. The patient denies chest pain/ shortness of breath, orthopnea, PND, LE edema, syncope, presyncope or fatigue.        Patient Active Problem List    Diagnosis Date Noted    Postmenopausal osteoporosis 11/15/2019    Osteopenia due to cancer therapy 06/15/2017    Vasovagal syncope 10/11/2016    Left kidney mass 06/08/2016    Rash of neck 01/12/2016    Use of exemestane (Aromasin) 09/15/2015    S/P left hemicolectomy 11/11/2010    S/P bilateral mastectomy 10/18/2010    Essential hypertension, benign 10/12/2010    Dyslipidemia (high LDL; low HDL) 10/12/2010    S/P left hemicolectomy 10/12/2010    Family history of coronary artery disease 10/12/2010    Malignant neoplasm of female breast ([facility] [address] 09/02/2010      Suze Wen MD  Past Medical History:   Diagnosis Date    Arthritis     Breast CA ([facility] [address]     left    Cancer ([facility] [address] 1998    colon cancer    Dyslipidemia (high LDL; low HDL) 10/12/2010    Essential hypertension, benign 10/12/2010    Family history of coronary artery disease 10/12/2010    FH: bowel obstruction 2015    Hypercholesteremia     Hypertension     Ill-defined condition     elevated cholesterol    Postmenopausal     S/P chemotherapy, time since greater than 12 weeks     S/P left hemicolectomy 10/12/2010      Past Surgical History:   Procedure Laterality Date    HX APPENDECTOMY      HX COLONOSCOPY      HX DILATION AND CURETTAGE      HX GYN      uterine ablation    HX OOPHORECTOMY      part of left ovary removed    HX OTHER SURGICAL      port a cath    HX SALPINGO-OOPHORECTOMY      HX TOTAL COLECTOMY      NY BREAST SURGERY PROCEDURE UNLISTED      double mastectomy     No Known Allergies   Family History   Problem Relation Age of Onset    Cancer Father 79        colon    Hypertension Mother    [place] Arthritis-rheumatoid Mother     Elevated Lipids Mother     No Known Problems Sister     Elevated Lipids Brother     Hypertension Brother     Hypertension Sister     Elevated Lipids Sister     No Known Problems Brother     Heart Disease Brother       Social History     Socioeconomic History    Marital status:      Spouse name: Not on file    Number of children: Not on file    Years of education: Not on file    Highest education level: Not on file   Occupational History    Not on file   Tobacco Use    Smoking status: Former Smoker     Packs/day: 0.50     Years: 10.00     Pack years: 5.00     Quit date: 1980     Years since quittin.6    Smokeless tobacco: Never Used    Tobacco comment: quit 29 years ago   Substance and Sexual Activity    Alcohol use: No    Drug use: No    Sexual activity: Not on file     Comment: menses age 13, menopause 44 age, first birth 32,    Other Topics Concern  Not on file   Social History Narrative    Not on file     Social Determinants of Health     Financial Resource Strain:     Difficulty of Paying Living Expenses:    Food Insecurity:     Worried About Running Out of Food in the Last Year:     [address] in the Last Year:    Transportation Needs:     Lack of Transportation (Medical):  Lack of Transportation (Non-Medical):    Physical Activity:     Days of Exercise per Week:     Minutes of Exercise per Session:    Stress:     Feeling of Stress :    Social Connections:     Frequency of Communication with Friends and Family:     Frequency of Social Gatherings with Friends and Family:     Attends Ten[employer] Services:     Active Member of Clubs or Organizations:     Attends Club or Organization Meetings:     Marital Status:    Intimate Partner Violence:     Fear of Current or Ex-Partner:     Emotionally Abused:     Physically Abused:     Sexually Abused:       Current Outpatient Medications   Medication Sig    aspirin delayed-release 81 mg tablet Take 1 Tab by mouth daily.  metFORMIN ER (GLUCOPHAGE XR) 500 mg tablet Take 1 Tab by mouth daily.  CALCIUM CARBONATE/VITAMIN D3 (CALTRATE 600 + D PO) Take 2 Caplet by mouth daily.  Denosumab (PROLIA) 60 mg/mL injection 60 mg by SubCUTAneous route. Every 6 months   Indications: POST-MENOPAUSAL OSTEOPOROSIS    magnesium oxide (MAG-OX) 400 mg tablet Take 400 mg by mouth daily.  atorvastatin (LIPITOR) 40 mg tablet Take 40 mg by mouth daily. Takes 60 mg daily    amlodipine (NORVASC) 10 mg tablet Take 10 mg by mouth daily. Indications: HYPERTENSION    multivitamins-minerals-lutein (CENTRUM SILVER) Tab Take  by mouth daily.  fulvestrant (FASLODEX IM) 500 mg by IntraMUSCular route every twenty-eight (28) days. (Patient not taking: Reported on 6/4/2021)     No current facility-administered medications for this visit. Review of Symptoms:    CONST  No weight change.  No fever, chills, sweats    ENT No visual changes, URI sx, sore throat    CV  See HPI   RESP  No cough, or sputum, wheezing. Also see HPI   GI  No abdominal pain or change in bowel habits. No heartburn or dysphagia. No melena or rectal bleeding.   No dysuria, urgency, frequency, hematuria   MSKEL  No joint pain, swelling. No muscle pain. SKIN  No rash or lesions. NEURO  No headache, syncope, or seizure. No weakness, loss of sensation, or paresthesias. PSYCH  No low mood or depression  No anxiety. HE/LYMPH  No easy bruising, abnormal bleeding, or enlarged glands. Physical ExamPhysical Exam:    Visit Vitals  /76 (BP 1 Location: Right arm, BP Patient Position: Sitting, BP Cuff Size: Adult)   Pulse 76   Temp 97.6 °F (36.4 °C) (Temporal)   Resp 16   Ht 5' 1\" (1.549 m)   Wt 165 lb (74.8 kg)   SpO2 98%   BMI 31.18 kg/m²     Gen: NAD  HEENT:  PERRL, throat clear  Neck: no adenopathy, no thyromegaly, no JVD   Heart:  Regular,Nl S1S2,  no murmur, gallop or rub. Lungs:  clear  Abdomen:   Soft, non-tender, bowel sounds are active.    Extremities:  No edema  Pulse: symmetric  Neuro: A&O times 3, No focal neuro deficits    Cardiographics    ECG: NSR, PRWP, no acute changes    Labs:   Lab Results   Component Value Date/Time    Sodium 142 06/04/2021 09:45 AM    Sodium 140 12/03/2020 09:52 AM    Sodium 140 05/08/2020 09:46 AM    Sodium 142 11/21/2019 09:38 AM    Sodium 142 05/15/2019 09:23 AM    Potassium 4.0 06/04/2021 09:45 AM    Potassium 3.6 12/03/2020 09:52 AM    Potassium 4.1 05/08/2020 09:46 AM    Potassium 4.2 11/21/2019 09:38 AM    Potassium 3.5 05/15/2019 09:23 AM    Chloride 105 06/04/2021 09:45 AM    Chloride 102 12/03/2020 09:52 AM    Chloride 104 05/08/2020 09:46 AM    Chloride 102 11/21/2019 09:38 AM    Chloride 103 05/15/2019 09:23 AM    CO2 28 06/04/2021 09:45 AM    CO2 24 12/03/2020 09:52 AM    CO2 28 05/08/2020 09:46 AM    CO2 30 11/21/2019 09:38 AM    CO2 27 05/15/2019 09:23 AM    Anion gap 9 06/04/2021 09:45 AM    Anion gap 14 12/03/2020 09:52 AM    Anion gap 8 05/08/2020 09:46 AM    Anion gap 10 11/21/2019 09:38 AM    Anion gap 12 05/15/2019 09:23 AM    Glucose 111 (H) 06/04/2021 09:45 AM    Glucose 126 (H) 12/03/2020 09:52 AM    Glucose 127 (H) 05/08/2020 09:46 AM    Glucose 128 (H) 11/21/2019 09:38 AM    Glucose 107 (H) 05/15/2019 09:23 AM    BUN 13 06/04/2021 09:45 AM    BUN 13 12/03/2020 09:52 AM    BUN 13 05/08/2020 09:46 AM    BUN 13 11/21/2019 09:38 AM    BUN 17 05/15/2019 09:23 AM    Creatinine 0.79 06/04/2021 09:45 AM    Creatinine 0.84 12/03/2020 09:52 AM    Creatinine 0.79 05/08/2020 09:46 AM    Creatinine 0.83 11/21/2019 09:38 AM    Creatinine 0.83 05/15/2019 09:23 AM    BUN/Creatinine ratio 16 06/04/2021 09:45 AM    BUN/Creatinine ratio 15 12/03/2020 09:52 AM    BUN/Creatinine ratio 16 05/08/2020 09:46 AM    BUN/Creatinine ratio 16 11/21/2019 09:38 AM    BUN/Creatinine ratio 20 05/15/2019 09:23 AM    GFR est AA >60 06/04/2021 09:45 AM    GFR est AA >60 12/03/2020 09:52 AM    GFR est AA >60 05/08/2020 09:46 AM    GFR est AA >60 11/21/2019 09:38 AM    GFR est AA >60 05/15/2019 09:23 AM    GFR est non-AA >60 06/04/2021 09:45 AM    GFR est non-AA >60 12/03/2020 09:52 AM    GFR est non-AA >60 05/08/2020 09:46 AM    GFR est non-AA >60 11/21/2019 09:38 AM    GFR est non-AA >60 05/15/2019 09:23 AM    Calcium 9.5 06/04/2021 09:45 AM    Calcium 9.4 12/03/2020 09:52 AM    Calcium 9.0 05/08/2020 09:46 AM    Calcium 9.4 11/21/2019 09:38 AM    Calcium 9.6 05/15/2019 09:23 AM    Bilirubin, total 0.5 05/22/2019 09:50 AM    Bilirubin, total 0.4 11/14/2018 09:30 AM    Bilirubin, total 0.5 10/19/2017 09:27 AM    Bilirubin, total 0.5 06/15/2017 11:15 AM    Bilirubin, total 0.4 10/15/2010 02:00 PM    Alk. phosphatase 104 05/22/2019 09:50 AM    Alk. phosphatase 78 11/14/2018 09:30 AM    Alk. phosphatase 80 10/19/2017 09:27 AM    Alk. phosphatase 77 06/15/2017 11:15 AM    Alk.  phosphatase 142 (H) 10/15/2010 02:00 PM    Protein, total 7.5 05/22/2019 09:50 AM    Protein, total 7.8 11/14/2018 09:30 AM    Protein, total 8.5 (H) 10/19/2017 09:27 AM    Protein, total 8.1 06/15/2017 11:15 AM    Protein, total 7.5 10/15/2010 02:00 PM    Albumin 4.2 06/04/2021 09:45 AM    Albumin 4.5 12/03/2020 09:52 AM    Albumin 4.4 05/08/2020 09:46 AM    Albumin 4.3 11/21/2019 09:38 AM    Albumin 4.1 05/22/2019 09:50 AM    Globulin 3.4 05/22/2019 09:50 AM    Globulin 3.5 11/14/2018 09:30 AM    Globulin 3.9 10/19/2017 09:27 AM    Globulin 3.8 06/15/2017 11:15 AM    Globulin 3.5 10/15/2010 02:00 PM    A-G Ratio 1.2 05/22/2019 09:50 AM    A-G Ratio 1.2 11/14/2018 09:30 AM    A-G Ratio 1.2 10/19/2017 09:27 AM    A-G Ratio 1.1 06/15/2017 11:15 AM    A-G Ratio 1.1 10/15/2010 02:00 PM    ALT (SGPT) 34 05/22/2019 09:50 AM    ALT (SGPT) 26 11/14/2018 09:30 AM    ALT (SGPT) 35 10/19/2017 09:27 AM    ALT (SGPT) 30 06/15/2017 11:15 AM    ALT (SGPT) 33 10/15/2010 02:00 PM     No results found for: CPK, CPKX, CPX  No results found for: CHOL, CHOLX, CHLST, CHOLV, 514936, HDL, HDLP, LDL, LDLC, DLDLP, TGLX, TRIGL, TRIGP, CHHD, CHHDX  No results found for this or any previous visit. Assessment:         Patient Active Problem List    Diagnosis Date Noted    Postmenopausal osteoporosis 11/15/2019    Osteopenia due to cancer therapy 06/15/2017    Vasovagal syncope 10/11/2016    Left kidney mass 06/08/2016    Rash of neck 01/12/2016    Use of exemestane (Aromasin) 09/15/2015    S/P left hemicolectomy 11/11/2010    S/P bilateral mastectomy 10/18/2010    Essential hypertension, benign 10/12/2010    Dyslipidemia (high LDL; low HDL) 10/12/2010    S/P left hemicolectomy 10/12/2010    Family history of coronary artery disease 10/12/2010    Malignant neoplasm of female breast ([facility] [address] 09/02/2010     Hx hypertension, dyslipidemia, colon ca (s/p hemicolectomy), breast ca--s/p bilat mastectomies, XRT/Chemo; No known cardiac hx.  Episode of syncope after spell of abdominal pain, N/V, diarrhea, cramping and got up from commode--passed out. Initial ED w/u neg--told likely vasovagal. One episode 30-40 yrs ago. Seen by Dr. Tita Jaramillo testing: ECHO 10/6/16--normal LV size/walls, LVEF 65-70, mild MR, RSVP 42. Carotid dopplers 32/4/09--4-83% DIONY, 71-40% LICA, bilat antegrade vertebrals. Holter monitor 10/16 with NSR, PAC's, one 3 beat PAT run. Had some issues with LE edema, improved after reduced Na. Over 2018 summer had confusion/weakness--prob TIA, at [facility] with neg CV w/u. Echo 7/18 with LVEF 02-30, grade I diastolic, valves ok.   No CV sx or complaints currently other than occ palpitations, vague discomfort related to stress/anxiety--non-exertional..  Continues to see PCP and Oncology with recent OV.  Last ECHO 8/26/20 with LVEF 04%, grade I diastolic, valves ok. Labs 6/9/21 with CBC normal, CMP normal except BUN 23 (Cr 0.8), chol 198, , HDL 56, , HbA1c 6.33     Plan:     Doing well with no adverse cardiac symptoms. Lipids and labs followed by PCP. Continue current care and f/u in 6 months.     Osiris Florez MD

## 2021-09-10 LAB — HBA1C MFR BLD HPLC: 6.6 %

## 2021-12-07 NOTE — PROGRESS NOTES
Ludmila Galeano a 71 y. o. female who is seen for follow up of Breast and Colon Cancer. Patient has had bilateral mastectomies. Patient is scheduled for Prolia injection today. Patient denies complaints. Patient hypertensive,  She will monitor  BP at home, and follow up with PCP if it remains 140/80 or greater. DR Morgan Bennett aware. Key Oncology Meds     Patient is on no Oncologic meds. Key Pain Meds     The patient is on no pain meds.         Chemotherapy Flowsheet 2/17/2021   Cycle -   Date 2/17/2021   Drug / Regimen Faslodex   Dosage 500 mg    Time Up Given   Notes 250 mg in right and 250 in left GM

## 2021-12-08 ENCOUNTER — OFFICE VISIT (OUTPATIENT)
Dept: ONCOLOGY | Age: 71
End: 2021-12-08
Payer: MEDICARE

## 2021-12-08 ENCOUNTER — HOSPITAL ENCOUNTER (OUTPATIENT)
Dept: INFUSION THERAPY | Age: 71
Discharge: HOME | End: 2021-12-08
Payer: MEDICARE

## 2021-12-08 VITALS
OXYGEN SATURATION: 98 % | HEART RATE: 66 BPM | SYSTOLIC BLOOD PRESSURE: 148 MMHG | WEIGHT: 166.4 LBS | BODY MASS INDEX: 31.42 KG/M2 | HEIGHT: 61 IN | DIASTOLIC BLOOD PRESSURE: 66 MMHG | RESPIRATION RATE: 16 BRPM | TEMPERATURE: 98 F

## 2021-12-08 VITALS
DIASTOLIC BLOOD PRESSURE: 66 MMHG | BODY MASS INDEX: 31.42 KG/M2 | HEIGHT: 61 IN | HEART RATE: 66 BPM | SYSTOLIC BLOOD PRESSURE: 148 MMHG | OXYGEN SATURATION: 98 % | RESPIRATION RATE: 16 BRPM | WEIGHT: 166.4 LBS | TEMPERATURE: 98 F

## 2021-12-08 DIAGNOSIS — M85.80 OSTEOPENIA DUE TO CANCER THERAPY: Primary | ICD-10-CM

## 2021-12-08 DIAGNOSIS — Z17.0 MALIGNANT NEOPLASM OF LEFT BREAST IN FEMALE, ESTROGEN RECEPTOR POSITIVE, UNSPECIFIED SITE OF BREAST (HCC): Primary | ICD-10-CM

## 2021-12-08 DIAGNOSIS — C50.912 MALIGNANT NEOPLASM OF LEFT BREAST IN FEMALE, ESTROGEN RECEPTOR POSITIVE, UNSPECIFIED SITE OF BREAST (HCC): Primary | ICD-10-CM

## 2021-12-08 DIAGNOSIS — Z90.13 S/P BILATERAL MASTECTOMY: ICD-10-CM

## 2021-12-08 LAB
ALBUMIN SERPL-MCNC: 4.5 G/DL (ref 3.5–5)
ALBUMIN/GLOB SERPL: 1.1 {RATIO} (ref 1.1–2.2)
ALP SERPL-CCNC: 87 U/L (ref 45–117)
ALT SERPL-CCNC: 31 U/L (ref 12–78)
ANION GAP SERPL CALC-SCNC: 11 MMOL/L (ref 5–15)
AST SERPL-CCNC: 18 U/L (ref 15–37)
BILIRUB SERPL-MCNC: 0.6 MG/DL (ref 0.2–1)
BUN SERPL-MCNC: 14 MG/DL (ref 6–20)
BUN/CREAT SERPL: 18 (ref 12–20)
CALCIUM SERPL-MCNC: 9.3 MG/DL (ref 8.5–10.1)
CHLORIDE SERPL-SCNC: 103 MMOL/L (ref 97–108)
CO2 SERPL-SCNC: 28 MMOL/L (ref 21–32)
CREAT SERPL-MCNC: 0.76 MG/DL (ref 0.55–1.02)
GLOBULIN SER CALC-MCNC: 4.1 G/DL (ref 2–4)
GLUCOSE SERPL-MCNC: 113 MG/DL (ref 65–100)
MAGNESIUM SERPL-MCNC: 2.3 MG/DL (ref 1.6–2.4)
PHOSPHATE SERPL-MCNC: 3.4 MG/DL (ref 2.6–4.7)
POTASSIUM SERPL-SCNC: 3.4 MMOL/L (ref 3.5–5.1)
PROT SERPL-MCNC: 8.6 G/DL (ref 6.4–8.2)
SODIUM SERPL-SCNC: 142 MMOL/L (ref 136–145)

## 2021-12-08 PROCEDURE — G8753 SYS BP > OR = 140: HCPCS | Performed by: INTERNAL MEDICINE

## 2021-12-08 PROCEDURE — 1090F PRES/ABSN URINE INCON ASSESS: CPT | Performed by: INTERNAL MEDICINE

## 2021-12-08 PROCEDURE — G8427 DOCREV CUR MEDS BY ELIG CLIN: HCPCS | Performed by: INTERNAL MEDICINE

## 2021-12-08 PROCEDURE — 36415 COLL VENOUS BLD VENIPUNCTURE: CPT

## 2021-12-08 PROCEDURE — 99213 OFFICE O/P EST LOW 20 MIN: CPT | Performed by: INTERNAL MEDICINE

## 2021-12-08 PROCEDURE — 1101F PT FALLS ASSESS-DOCD LE1/YR: CPT | Performed by: INTERNAL MEDICINE

## 2021-12-08 PROCEDURE — G8754 DIAS BP LESS 90: HCPCS | Performed by: INTERNAL MEDICINE

## 2021-12-08 PROCEDURE — G8510 SCR DEP NEG, NO PLAN REQD: HCPCS | Performed by: INTERNAL MEDICINE

## 2021-12-08 PROCEDURE — 84100 ASSAY OF PHOSPHORUS: CPT

## 2021-12-08 PROCEDURE — 80053 COMPREHEN METABOLIC PANEL: CPT

## 2021-12-08 PROCEDURE — G8536 NO DOC ELDER MAL SCRN: HCPCS | Performed by: INTERNAL MEDICINE

## 2021-12-08 PROCEDURE — G9711 PT HX TOT COL OR COLON CA: HCPCS | Performed by: INTERNAL MEDICINE

## 2021-12-08 PROCEDURE — 83735 ASSAY OF MAGNESIUM: CPT

## 2021-12-08 PROCEDURE — 96372 THER/PROPH/DIAG INJ SC/IM: CPT

## 2021-12-08 PROCEDURE — 74011250636 HC RX REV CODE- 250/636: Performed by: INTERNAL MEDICINE

## 2021-12-08 PROCEDURE — G8417 CALC BMI ABV UP PARAM F/U: HCPCS | Performed by: INTERNAL MEDICINE

## 2021-12-08 RX ORDER — PREDNISOLONE ACETATE 10 MG/ML
1 SUSPENSION/ DROPS OPHTHALMIC DAILY
COMMUNITY
Start: 2021-11-05 | End: 2022-03-09

## 2021-12-08 RX ADMIN — DENOSUMAB 60 MG: 60 INJECTION SUBCUTANEOUS at 10:58

## 2021-12-08 NOTE — PROGRESS NOTES
Reason for Visit:   Eugenia Javed a 19 I. o. female who is seen for follow up of Breast and Colon Cancer     Treatment History:   Dx:  Stage IIA T2N0M0 invasive ductal carcinoma of left breast underwent MRM of left breast in 09/2010 and histopathology was suggestive of poorly differentiated infiltrating ductal carcinoma, 5 cm size and 0/5 sentinel LNs involved, ER & [place] positive Her 2 negative. She underwent oncotype Dx with recurrence score of 32 suggestive of high risk (21%) of distant recurrence. She then completed 4 cycles of adjuvant chemotherapy with docetaxel + cytoxan in 06/2011 and adjuvant radiation therapy until 01/2011. She was initially treated with Arimidex until 07/2012 but was dicontinued because she had osteoporosis and couldn't tolerate Fosamax. She was switched to Tamoxifen which was continued until 01/2015. Tamoxifen was discontinued due to findings of endometrial polyp and proliferative endothelium requiring D&C and eventually hysterectomy. She was then switched to Exemestane (Feb 2015--April 2019)--stopped due to prohibitive cost.   Current Tx: Fulvestrant since May 2019 and has been on prophylaxis with Prolia every 6 months for osteoporosis. Stop hormonal in March 2021     Dx:  Colon cancer s/p hemicolectomy in 1998. She was apparently started on adjuvant chemotherapy but couldn't tolerate and hence discontinued. Her last colonoscopy was on 12/11/2015 which showed hemorroids. Current Tx: Observation     Goal for both is cure--will see me every 6 months  History of Present Illness:   Doing well, good thanksgiving. No new issues overall. Occasional breast pain in the left mastectomy site.     Past Medical History:   Diagnosis Date    Arthritis     Breast CA ([address]     left    Cancer ([address] 1998    colon cancer    Dyslipidemia (high LDL; low HDL) 10/12/2010    Essential hypertension, benign 10/12/2010    Family history of coronary artery disease 10/12/2010    FH: bowel obstruction 2015    Hypercholesteremia     Hypertension     Ill-defined condition     elevated cholesterol    Postmenopausal     S/P chemotherapy, time since greater than 12 weeks     S/P left hemicolectomy 10/12/2010      Past Surgical History:   Procedure Laterality Date    HX APPENDECTOMY      HX COLONOSCOPY      HX DILATION AND CURETTAGE      HX GYN  2000    uterine ablation    HX OOPHORECTOMY      part of left ovary removed    HX OTHER SURGICAL  2011    port a cath    HX SALPINGO-OOPHORECTOMY      HX TOTAL COLECTOMY      AL BREAST SURGERY PROCEDURE UNLISTED  2010    double mastectomy      Social History     Tobacco Use    Smoking status: Former Smoker     Packs/day: 0.50     Years: 10.00     Pack years: 5.00     Quit date: 1980     Years since quittin.9    Smokeless tobacco: Never Used    Tobacco comment: quit 29 years ago   Substance Use Topics    Alcohol use: No      Family History   Problem Relation Age of Onset    Cancer Father 79        colon    Hypertension Mother     Arthritis-rheumatoid Mother     Elevated Lipids Mother     No Known Problems Sister     Elevated Lipids Brother     Hypertension Brother     Hypertension Sister     Elevated Lipids Sister     No Known Problems Brother     Heart Disease Brother      Current Outpatient Medications   Medication Sig    prednisoLONE acetate (PRED FORTE) 1 % ophthalmic suspension Administer 1 Drop to right eye daily.  aspirin delayed-release 81 mg tablet Take 1 Tab by mouth daily.  metFORMIN ER (GLUCOPHAGE XR) 500 mg tablet Take 1 Tab by mouth daily.  CALCIUM CARBONATE/VITAMIN D3 (CALTRATE 600 + D PO) Take 2 Caplet by mouth daily.  Denosumab (PROLIA) 60 mg/mL injection 60 mg by SubCUTAneous route. Every 6 months   Indications: POST-MENOPAUSAL OSTEOPOROSIS    magnesium oxide (MAG-OX) 400 mg tablet Take 400 mg by mouth daily.  atorvastatin (LIPITOR) 40 mg tablet Take 40 mg by mouth daily.  Takes 60 mg daily    amlodipine (NORVASC) 10 mg tablet Take 10 mg by mouth daily. Indications: HYPERTENSION    multivitamins-minerals-lutein (CENTRUM SILVER) Tab Take  by mouth daily. No current facility-administered medications for this visit. No Known Allergies     Review of Systems: A complete review of systems was obtained, negative except as described above. Physical Exam:     Visit Vitals  BP (!) 148/66   Pulse 66   Temp 98 °F (36.7 °C) (Oral)   Resp 16   Ht 5' 1\" (1.549 m)   Wt 166 lb 6.4 oz (75.5 kg)   SpO2 98%   BMI 31.44 kg/m²     ECOG PS: 0  General: No distress  Eyes: PERRLA, anicteric sclerae  HENT: Atraumatic, OP clear  Neck: Supple  Lymphatic: No cervical, supraclavicular, or inguinal adenopathy  Respiratory: CTAB, normal respiratory effort  Breast: bilateral mastectomy--no suspicious lesions  CV: Normal rate, regular rhythm, no murmurs, no peripheral edema  GI: Soft, nontender, nondistended, no masses, no hepatomegaly, no splenomegaly  MS: Normal gait and station. Digits without clubbing or cyanosis. Skin: No rashes, ecchymoses, or petechiae. Normal temperature, turgor, and texture. Psych: Alert, oriented, appropriate affect, normal judgment/insight    Results:     Lab Results   Component Value Date/Time    WBC 4.5 11/14/2018 09:30 AM    HGB 13.7 11/14/2018 09:30 AM    HCT 43.2 11/14/2018 09:30 AM    PLATELET 736 03/34/2123 09:30 AM    MCV 87.8 11/14/2018 09:30 AM    ABS.  NEUTROPHILS 2.1 11/14/2018 09:30 AM     Lab Results   Component Value Date/Time    Sodium 142 06/04/2021 09:45 AM    Potassium 4.0 06/04/2021 09:45 AM    Chloride 105 06/04/2021 09:45 AM    CO2 28 06/04/2021 09:45 AM    Glucose 111 (H) 06/04/2021 09:45 AM    BUN 13 06/04/2021 09:45 AM    Creatinine 0.79 06/04/2021 09:45 AM    GFR est AA >60 06/04/2021 09:45 AM    GFR est non-AA >60 06/04/2021 09:45 AM    Calcium 9.5 06/04/2021 09:45 AM    Creatinine (POC) 0.8 12/12/2016 10:02 AM     Lab Results   Component Value Date/Time Bilirubin, total 0.5 05/22/2019 09:50 AM    ALT (SGPT) 34 05/22/2019 09:50 AM    Alk. phosphatase 104 05/22/2019 09:50 AM    Protein, total 7.5 05/22/2019 09:50 AM    Albumin 4.2 06/04/2021 09:45 AM    Globulin 3.4 05/22/2019 09:50 AM       PET 11/2/2017  IMPRESSION: No Foci of Abnormal Hypermetabolism. There is a 6 mm nodule right  lower lobe which does not demonstrate increased metabolic activity. This was  present on a CT dated 6/21/2016.     Dexa 7/11/2017  IMPRESSION:  Osteopenia.     Bone mineral density of the total left hip has increased 0.3% since the priorexamination. Bone mineral density of the total right hip has increased 4.4% since the priorexamination. Bone mineral density of the total lumbar spine has increased 4.7% since theprior examination.     Assessment:   1) Breast Cancer ER+HER2-  2) Colon Cancer  3) Osteoporosis/Osteopenia  4) Chest pain/spasm  5) Left elbow/arm swelling     Plan:   1. Stage IIA invasive ductal carcinoma of right breast, ER & GA positive Her 2 negative, high Oncotype:     - She was initially diagnosed in 09/2010 and completed her treatments as of 01/2011. - She was treated with Arimidex for 1 year and was switched to Tamoxifen for 2.5 years until 01/2015. Then Aromasin, and completed with Faslodex. - Completed Faslodex Feb 2021  - She underwent bilateral mastectomies and hence no role for screening mammograms.  - She will return to the clinic in 6 months      2. History of Colon cancer s/p hemicolectomy and was unable to tolerate adjuvant chemo in 1998:     - Her last colonscopy was on 12/11/2015 for rectal bleeding and was found to be secondary to hemorroids. .   - Advised her to continue her 5 yearly surveillance colonoscopies.     3. Osteoporosis/ Osteopenia:     - Last Dexa scan was done 07/2017and was suggestive of Osteopenia. She is due for a repeat. - Her last Prolia was in 12/8/2021.   - Advised her to continue oral calcium + vitamin D supplements.     4. Family History of Colon Cancer, Breast Cancer, and Endometrial Cancer     - Referred to [facility] for eval of BRCA and HNPCC--July 2019,  Was negative for BRCA 1/2, get results from [address]    5. Chest pain/spasm    - Chest CT, had ECHO with cardiology and told normal    6. Left arm/elbow swelling    - Discussed eval for lymphedema--would be minimal or mild I suspect--she will consider and let us know. Elevate arm as much as possible.       Signed By: Page Barr MD

## 2021-12-08 NOTE — PROGRESS NOTES
[facility] Progress Note    Date: 2021    Name: Tom Christiansen    MRN: [id number]         : 1950      Ms. Hermelinda Leyva was assessed and education was provided. Ambulatory and voiced no new concerns. Labs draw peripherally. In with Dr. Binu Cotton    Ms. Suarez's vitals were reviewed and patient was observed for 5 minutes prior to treatment. Visit Vitals  BP (!) 148/66 (BP 1 Location: Left upper arm, BP Patient Position: Sitting)   Pulse 66   Temp 98 °F (36.7 °C)   Resp 16   Ht 5' 1\" (1.549 m)   Wt 75.5 kg (166 lb 6.4 oz)   SpO2 98%   Breastfeeding No   BMI 31.44 kg/m²       Lab results were obtained and reviewed. Recent Results (from the past 12 hour(s))   METABOLIC PANEL, COMPREHENSIVE    Collection Time: 21 10:05 AM   Result Value Ref Range    Sodium 142 136 - 145 mmol/L    Potassium 3.4 (L) 3.5 - 5.1 mmol/L    Chloride 103 97 - 108 mmol/L    CO2 28 21 - 32 mmol/L    Anion gap 11 5 - 15 mmol/L    Glucose 113 (H) 65 - 100 mg/dL    BUN 14 6 - 20 MG/DL    Creatinine 0.76 0.55 - 1.02 MG/DL    BUN/Creatinine ratio 18 12 - 20      GFR est AA >60 >60 ml/min/1.73m2    GFR est non-AA >60 >60 ml/min/1.73m2    Calcium 9.3 8.5 - 10.1 MG/DL    Bilirubin, total 0.6 0.2 - 1.0 MG/DL    ALT (SGPT) 31 12 - 78 U/L    AST (SGOT) 18 15 - 37 U/L    Alk. phosphatase 87 45 - 117 U/L    Protein, total 8.6 (H) 6.4 - 8.2 g/dL    Albumin 4.5 3.5 - 5.0 g/dL    Globulin 4.1 (H) 2.0 - 4.0 g/dL    A-G Ratio 1.1 1.1 - 2.2     MAGNESIUM    Collection Time: 21 10:05 AM   Result Value Ref Range    Magnesium 2.3 1.6 - 2.4 mg/dL   PHOSPHORUS    Collection Time: 12/08/21 10:05 AM   Result Value Ref Range    Phosphorus 3.4 2.6 - 4.7 MG/DL       Prolia 60 mg  was administered subcutaneous in her right arm. No redness, pain or swelling at the site. Ms. Hermelinda Leyva tolerated well, and had no complaints. Patient armband removed and shredded. Ms. Hermelinda Leyva was discharged from [place] [address] in stable condition at 11:00. She is to return on June 8th  at 09:00 for her next appointment.     Alex Aiken RN  December 8, 2021  1:02 PM

## 2021-12-08 NOTE — PATIENT INSTRUCTIONS
Heart-Healthy Diet: Care Instructions  Your Care Instructions     A heart-healthy diet has lots of vegetables, fruits, nuts, beans, and whole grains, and is low in salt. It limits foods that are high in saturated fat, such as meats, cheeses, and fried foods. It may be hard to change your diet, but even small changes can lower your risk of heart attack and heart disease. Follow-up care is a key part of your treatment and safety. Be sure to make and go to all appointments, and call your doctor if you are having problems. It's also a good idea to know your test results and keep a list of the medicines you take. How can you care for yourself at home? Watch your portions  · Use food labels to learn what the recommended servings are for the foods you eat. · Eat only the number of calories you need to stay at a healthy weight. If you need to lose weight, eat fewer calories than your body burns (through exercise and other physical activity). Eat more fruits and vegetables  · Eat a variety of fruit and vegetables every day. Dark green, deep orange, red, or yellow fruits and vegetables are especially good for you. Examples include spinach, carrots, peaches, and berries. · Keep carrots, celery, and other veggies handy for snacks. Buy fruit that is in season and store it where you can see it so that you will be tempted to eat it. · Cook dishes that have a lot of veggies in them, such as stir-fries and soups. Limit saturated fat  · Read food labels, and try to avoid saturated fats. They increase your risk of heart disease. · Use olive or canola oil when you cook. · Bake, broil, grill, or steam foods instead of frying them. · Choose lean meats instead of high-fat meats such as hot dogs and sausages. Cut off all visible fat when you prepare meat. · Eat fish, skinless poultry, and meat alternatives such as soy products instead of high-fat meats.  Soy products, such as tofu, may be especially good for your heart.  · Choose low-fat or fat-free milk and dairy products. Eat foods high in fiber  · Eat a variety of grain products every day. Include whole-grain foods that have lots of fiber and nutrients. Examples of whole-grain foods include oats, whole wheat bread, and brown rice. · Buy whole-grain breads and cereals, instead of white bread or pastries. Limit salt and sodium  · Limit how much salt and sodium you eat to help lower your blood pressure. · Taste food before you salt it. Add only a little salt when you think you need it. With time, your taste buds will adjust to less salt. · Eat fewer snack items, fast foods, and other high-salt, processed foods. Check food labels for the amount of sodium in packaged foods. · Choose low-sodium versions of canned goods (such as soups, vegetables, and beans). Limit sugar  · Limit drinks and foods with added sugar. These include candy, desserts, and soda pop. Limit alcohol  · Limit alcohol to no more than 2 drinks a day for men and 1 drink a day for women. Too much alcohol can cause health problems. When should you call for help? Watch closely for changes in your health, and be sure to contact your doctor if:    · You would like help planning heart-healthy meals. Where can you learn more? Go to http://www.vieyra.com/  Enter V137 in the search box to learn more about \"Heart-Healthy Diet: Care Instructions. \"  Current as of: December 17, 2020               Content Version: 13.0  © 2006-2021 Healthwise, Inc[employer]. Care instructions adapted under license by [employer] (which disclaims liability or warranty for this information). If you have questions about a medical condition or this instruction, always ask your healthcare professional. [place] [address] any warranty or liability for your use of this information. Learning About High Blood Pressure  What is high blood pressure?      Blood pressure is a measure of how hard the blood pushes against the walls of your arteries. It's normal for blood pressure to go up and down throughout the day. But if it stays up, you have high blood pressure. Another name for high blood pressure is hypertension. Two numbers tell you your blood pressure. The first number is the systolic pressure (top number). It shows how hard the blood pushes when your heart is pumping. The second number is the diastolic pressure (bottom number). It shows how hard the blood pushes between heartbeats, when your heart is relaxed and filling with blood. Your doctor will give you a goal for your blood pressure based on your health and your age. High blood pressure (hypertension) means that the top number stays high, or the bottom number stays high, or both. High blood pressure increases the risk of stroke, heart attack, and other problems. What happens when you have high blood pressure? · Blood flows through your arteries with too much force. Over time, this can damage the heart and the walls of your arteries. But you can't feel it. High blood pressure usually doesn't cause symptoms. · High blood pressure makes your heart work harder. And that can lead to heart failure, which means your heart doesn't pump as much blood as your body needs. · Fat and calcium start to build up in your arteries. This buildup is called hardening of the arteries. It can cause many problems including a heart attack and stroke. · Arteries also carry blood and oxygen to organs like your eyes, kidneys, and brain. If high blood pressure damages those arteries, it can lead to vision loss, kidney disease, stroke, and a higher risk of dementia. How can you prevent high blood pressure? · Stay at a healthy weight. · Try to limit how much sodium you eat to less than 2,300 milligrams (mg) a day. If you limit your sodium to 1,500 mg a day, you can lower your blood pressure even more.   ? Buy foods that are labeled \"unsalted,\" \"sodium-free,\" or \"low-sodium. \" Foods labeled \"reduced-sodium\" and \"light sodium\" may still have too much sodium. ? Flavor your food with garlic, lemon juice, onion, vinegar, herbs, and spices instead of salt. Do not use soy sauce, steak sauce, onion salt, garlic salt, mustard, or ketchup on your food. ? Use less salt (or none) when recipes call for it. You can often use half the salt a recipe calls for without losing flavor. · Be physically active. Get at least 30 minutes of exercise on most days of the week. Walking is a good choice. You also may want to do other activities, such as running, swimming, cycling, or playing tennis or team sports. · Limit alcohol to 2 drinks a day for men and 1 drink a day for women. · Eat plenty of fruits, vegetables, and low-fat dairy products. Eat less saturated and total fats. How is high blood pressure treated? · Your doctor will suggest making lifestyle changes to help your heart. For example, your doctor may ask you to eat healthy foods, quit smoking, lose extra weight, and be more active. · If lifestyle changes don't help enough, your doctor may recommend that you take medicine. · When blood pressure is very high, medicines are needed to lower it. Follow-up care is a key part of your treatment and safety. Be sure to make and go to all appointments, and call your doctor if you are having problems. It's also a good idea to know your test results and keep a list of the medicines you take. Where can you learn more? Go to http://www.vieyra.com/  Enter P501 in the search box to learn more about \"Learning About High Blood Pressure. \"  Current as of: April 29, 2021               Content Version: 13.0  © 5928-9238 Healthwise, Incorporated. Care instructions adapted under license by [employer] (which disclaims liability or warranty for this information).  If you have questions about a medical condition or this instruction, always ask your healthcare professional. [address] any warranty or liability for your use of this information.

## 2022-01-19 LAB — HBA1C MFR BLD HPLC: 6.4 %

## 2022-03-03 NOTE — PROGRESS NOTES
Arcelia Segura is a 66 y.o. female is here for routine f/u. Pmhx  hypertension, dyslipidemia, colon ca (s/p hemicolectomy), breast ca--s/p bilat mastectomies, XRT/Chemo;     Last seen by Dr Nigel Aguayo in 8/23/2021:   Hx hypertension, dyslipidemia, colon ca (s/p hemicolectomy), breast ca--s/p bilat mastectomies, XRT/Chemo; No known cardiac hx. Episode of syncope after spell of abdominal pain, N/V, diarrhea, cramping and got up from commode--passed out. Initial ED w/u neg--told likely vasovagal. One episode 30-40 yrs ago. Seen by Dr. Jm Barclay testing: ECHO 10/6/16--normal LV size/walls, LVEF 65-70, mild MR, RSVP 42. Carotid dopplers 97/2/36--7-82% DIONY, 93-07% LICA, bilat antegrade vertebrals. Holter monitor 10/16 with NSR, PAC's, one 3 beat PAT run. Had some issues with LE edema, improved after reduced Na. Over 2018 summer had confusion/weakness--prob TIA, at [facility] with neg CV w/u. Echo 7/18 with LVEF 57-82, grade I diastolic, valves ok.   No CV sx or complaints currently other than occ palpitations, vague discomfort related to stress/anxiety--non-exertional..  Continues to see PCP and Oncology with recent OV.  Last ECHO 8/26/20 with LVEF 63%, grade I diastolic, valves ok. Labs 6/9/21 with CBC normal, CMP normal except BUN 23 (Cr 0.8), chol 198, , HDL 56, , HbA1c 6.33. Today, episode of chest discomfort about 2 weeks ago, lasted a few minutes, relieved with Rolaid. No sob. The patient denies  shortness of breath, orthopnea, PND, LE edema, syncope, presyncope or fatigue.        Patient Active Problem List    Diagnosis Date Noted    Postmenopausal osteoporosis 11/15/2019    Osteopenia due to cancer therapy 06/15/2017    Vasovagal syncope 10/11/2016    Left kidney mass 06/08/2016    Rash of neck 01/12/2016    Use of exemestane (Aromasin) 09/15/2015    S/P left hemicolectomy 11/11/2010    S/P bilateral mastectomy 10/18/2010    Essential hypertension, benign 10/12/2010  Dyslipidemia (high LDL; low HDL) 10/12/2010    S/P left hemicolectomy 10/12/2010    Family history of coronary artery disease 10/12/2010    Malignant neoplasm of female breast ([facility] [address] 2010      Suze Wen MD  Past Medical History:   Diagnosis Date    Arthritis     Breast CA ([facility] [address]     left    Cancer ([facility] [address]     colon cancer    Dyslipidemia (high LDL; low HDL) 10/12/2010    Essential hypertension, benign 10/12/2010    Family history of coronary artery disease 10/12/2010    FH: bowel obstruction 2015    Hypercholesteremia     Hypertension     Ill-defined condition     elevated cholesterol    Postmenopausal     S/P chemotherapy, time since greater than 12 weeks     S/P left hemicolectomy 10/12/2010      Past Surgical History:   Procedure Laterality Date    HX APPENDECTOMY      HX COLONOSCOPY      HX DILATION AND CURETTAGE      HX GYN  2000    uterine ablation    HX OOPHORECTOMY      part of left ovary removed    HX OTHER SURGICAL      port a cath    HX SALPINGO-OOPHORECTOMY      HX TOTAL COLECTOMY      NE BREAST SURGERY PROCEDURE UNLISTED      double mastectomy     No Known Allergies   Family History   Problem Relation Age of Onset    Cancer Father 79        colon    Hypertension Mother    Juventino James Arthritis-rheumatoid Mother     Elevated Lipids Mother     No Known Problems Sister     Elevated Lipids Brother     Hypertension Brother     Hypertension Sister     Elevated Lipids Sister     No Known Problems Brother     Heart Disease Brother       Social History     Socioeconomic History    Marital status:      Spouse name: Not on file    Number of children: Not on file    Years of education: Not on file    Highest education level: Not on file   Occupational History    Not on file   Tobacco Use    Smoking status: Former Smoker     Packs/day: 0.50     Years: 10.00     Pack years: 5.00     Quit date: 1980     Years since quittin.2    Smokeless tobacco: Never Used    Tobacco comment: quit 29 years ago   Substance and Sexual Activity    Alcohol use: No    Drug use: No    Sexual activity: Not on file     Comment: menses age 13, menopause 44 age, first birth 32,    Other Topics Concern    Not on file   Social History Narrative    Not on file     Social Determinants of Health     Financial Resource Strain:     Difficulty of Paying Living Expenses: Not on file   Food Insecurity:     Worried About Running Out of Food in the Last Year: Not on file    Lynn of Food in the Last Year: Not on file   Transportation Needs:     Lack of Transportation (Medical): Not on file    Lack of Transportation (Non-Medical): Not on file   Physical Activity:     Days of Exercise per Week: Not on file    Minutes of Exercise per Session: Not on file   Stress:     Feeling of Stress : Not on file   Social Connections:     Frequency of Communication with Friends and Family: Not on file    Frequency of Social Gatherings with Friends and Family: Not on file    Attends Spiritism Services: Not on file    Active Member of [address] [place] [address] or Organizations: Not on file    Attends Club or Organization Meetings: Not on file    Marital Status: Not on file   Intimate Partner Violence:     Fear of Current or Ex-Partner: Not on file    Emotionally Abused: Not on file    Physically Abused: Not on file    Sexually Abused: Not on file   Housing Stability:     Unable to Pay for Housing in the Last Year: Not on file    Number of [place] in the Last Year: Not on file    Unstable Housing in the Last Year: Not on file      Current Outpatient Medications   Medication Sig    OTHER Mastectomy Bra and prosthesis x 3   Dx: Breast Cancer    aspirin delayed-release 81 mg tablet Take 1 Tab by mouth daily.  metFORMIN ER (GLUCOPHAGE XR) 500 mg tablet Take 1 Tab by mouth daily.  CALCIUM CARBONATE/VITAMIN D3 (CALTRATE 600 + D PO) Take 2 Caplet by mouth daily.     Denosumab (PROLIA) 60 mg/mL injection 60 mg by SubCUTAneous route. Every 6 months   Indications: POST-MENOPAUSAL OSTEOPOROSIS    magnesium oxide (MAG-OX) 400 mg tablet Take 400 mg by mouth daily.  atorvastatin (LIPITOR) 40 mg tablet Take 40 mg by mouth daily. Takes 60 mg daily    amlodipine (NORVASC) 10 mg tablet Take 10 mg by mouth daily. Indications: HYPERTENSION    multivitamins-minerals-lutein (CENTRUM SILVER) Tab Take  by mouth daily. No current facility-administered medications for this visit. Review of Symptoms:    CONST  No weight change. No fever, chills, sweats    ENT No visual changes, URI sx, sore throat    CV  See HPI   RESP  No cough, or sputum, wheezing. Also see HPI   GI  No abdominal pain or change in bowel habits. No heartburn or dysphagia. No melena or rectal bleeding.   No dysuria, urgency, frequency, hematuria   MSKEL  No joint pain, swelling. No muscle pain. SKIN  No rash or lesions. NEURO  No headache, syncope, or seizure. No weakness, loss of sensation, or paresthesias. PSYCH  No low mood or depression  No anxiety. HE/LYMPH  No easy bruising, abnormal bleeding, or enlarged glands. Physical ExamPhysical Exam:    Visit Vitals  BP (!) 140/82 (BP 1 Location: Right upper arm, BP Patient Position: Sitting, BP Cuff Size: Large adult)   Pulse 71   Temp 98.2 °F (36.8 °C) (Temporal)   Resp 18   Ht 5' 1\" (1.549 m)   Wt 158 lb (71.7 kg)   SpO2 98% Comment: ra   BMI 29.85 kg/m²     Gen: NAD  HEENT:  PERRL, throat clear  Neck: no adenopathy, no thyromegaly, no JVD   Heart:  Regular,Nl S1S2,  no murmur, gallop or rub. Lungs:  clear  Abdomen:   Soft, non-tender, bowel sounds are active.    Extremities:  No edema  Pulse: symmetric  Neuro: A&O times 3, No focal neuro deficits    Cardiographics    ECG: NSR,     Labs:   Lab Results   Component Value Date/Time    Sodium 142 12/08/2021 10:05 AM    Sodium 142 06/04/2021 09:45 AM    Sodium 140 12/03/2020 09:52 AM    Sodium 140 05/08/2020 09:46 AM    Sodium 142 11/21/2019 09:38 AM    Potassium 3.4 (L) 12/08/2021 10:05 AM    Potassium 4.0 06/04/2021 09:45 AM    Potassium 3.6 12/03/2020 09:52 AM    Potassium 4.1 05/08/2020 09:46 AM    Potassium 4.2 11/21/2019 09:38 AM    Chloride 103 12/08/2021 10:05 AM    Chloride 105 06/04/2021 09:45 AM    Chloride 102 12/03/2020 09:52 AM    Chloride 104 05/08/2020 09:46 AM    Chloride 102 11/21/2019 09:38 AM    CO2 28 12/08/2021 10:05 AM    CO2 28 06/04/2021 09:45 AM    CO2 24 12/03/2020 09:52 AM    CO2 28 05/08/2020 09:46 AM    CO2 30 11/21/2019 09:38 AM    Anion gap 11 12/08/2021 10:05 AM    Anion gap 9 06/04/2021 09:45 AM    Anion gap 14 12/03/2020 09:52 AM    Anion gap 8 05/08/2020 09:46 AM    Anion gap 10 11/21/2019 09:38 AM    Glucose 113 (H) 12/08/2021 10:05 AM    Glucose 111 (H) 06/04/2021 09:45 AM    Glucose 126 (H) 12/03/2020 09:52 AM    Glucose 127 (H) 05/08/2020 09:46 AM    Glucose 128 (H) 11/21/2019 09:38 AM    BUN 14 12/08/2021 10:05 AM    BUN 13 06/04/2021 09:45 AM    BUN 13 12/03/2020 09:52 AM    BUN 13 05/08/2020 09:46 AM    BUN 13 11/21/2019 09:38 AM    Creatinine 0.76 12/08/2021 10:05 AM    Creatinine 0.79 06/04/2021 09:45 AM    Creatinine 0.84 12/03/2020 09:52 AM    Creatinine 0.79 05/08/2020 09:46 AM    Creatinine 0.83 11/21/2019 09:38 AM    BUN/Creatinine ratio 18 12/08/2021 10:05 AM    BUN/Creatinine ratio 16 06/04/2021 09:45 AM    BUN/Creatinine ratio 15 12/03/2020 09:52 AM    BUN/Creatinine ratio 16 05/08/2020 09:46 AM    BUN/Creatinine ratio 16 11/21/2019 09:38 AM    GFR est AA >60 12/08/2021 10:05 AM    GFR est AA >60 06/04/2021 09:45 AM    GFR est AA >60 12/03/2020 09:52 AM    GFR est AA >60 05/08/2020 09:46 AM    GFR est AA >60 11/21/2019 09:38 AM    GFR est non-AA >60 12/08/2021 10:05 AM    GFR est non-AA >60 06/04/2021 09:45 AM    GFR est non-AA >60 12/03/2020 09:52 AM    GFR est non-AA >60 05/08/2020 09:46 AM    GFR est non-AA >60 11/21/2019 09:38 AM    Calcium 9.3 12/08/2021 10:05 AM    Calcium 9.5 06/04/2021 09:45 AM    Calcium 9.4 12/03/2020 09:52 AM    Calcium 9.0 05/08/2020 09:46 AM    Calcium 9.4 11/21/2019 09:38 AM    Bilirubin, total 0.6 12/08/2021 10:05 AM    Bilirubin, total 0.5 05/22/2019 09:50 AM    Bilirubin, total 0.4 11/14/2018 09:30 AM    Bilirubin, total 0.5 10/19/2017 09:27 AM    Bilirubin, total 0.5 06/15/2017 11:15 AM    Alk. phosphatase 87 12/08/2021 10:05 AM    Alk. phosphatase 104 05/22/2019 09:50 AM    Alk. phosphatase 78 11/14/2018 09:30 AM    Alk. phosphatase 80 10/19/2017 09:27 AM    Alk. phosphatase 77 06/15/2017 11:15 AM    Protein, total 8.6 (H) 12/08/2021 10:05 AM    Protein, total 7.5 05/22/2019 09:50 AM    Protein, total 7.8 11/14/2018 09:30 AM    Protein, total 8.5 (H) 10/19/2017 09:27 AM    Protein, total 8.1 06/15/2017 11:15 AM    Albumin 4.5 12/08/2021 10:05 AM    Albumin 4.2 06/04/2021 09:45 AM    Albumin 4.5 12/03/2020 09:52 AM    Albumin 4.4 05/08/2020 09:46 AM    Albumin 4.3 11/21/2019 09:38 AM    Globulin 4.1 (H) 12/08/2021 10:05 AM    Globulin 3.4 05/22/2019 09:50 AM    Globulin 3.5 11/14/2018 09:30 AM    Globulin 3.9 10/19/2017 09:27 AM    Globulin 3.8 06/15/2017 11:15 AM    A-G Ratio 1.1 12/08/2021 10:05 AM    A-G Ratio 1.2 05/22/2019 09:50 AM    A-G Ratio 1.2 11/14/2018 09:30 AM    A-G Ratio 1.2 10/19/2017 09:27 AM    A-G Ratio 1.1 06/15/2017 11:15 AM    ALT (SGPT) 31 12/08/2021 10:05 AM    ALT (SGPT) 34 05/22/2019 09:50 AM    ALT (SGPT) 26 11/14/2018 09:30 AM    ALT (SGPT) 35 10/19/2017 09:27 AM    ALT (SGPT) 30 06/15/2017 11:15 AM     No results found for: CPK, CPKX, CPX  No results found for: CHOL, CHOLX, CHLST, CHOLV, 447232, HDL, HDLP, LDL, LDLC, DLDLP, TGLX, TRIGL, TRIGP, CHHD, CHHDX  No results found for this or any previous visit.     Assessment:         Patient Active Problem List    Diagnosis Date Noted    Postmenopausal osteoporosis 11/15/2019    Osteopenia due to cancer therapy 06/15/2017    Vasovagal syncope 10/11/2016    Left kidney mass 06/08/2016    Rash of neck 01/12/2016    Use of exemestane (Aromasin) 09/15/2015    S/P left hemicolectomy 11/11/2010    S/P bilateral mastectomy 10/18/2010    Essential hypertension, benign 10/12/2010    Dyslipidemia (high LDL; low HDL) 10/12/2010    S/P left hemicolectomy 10/12/2010    Family history of coronary artery disease 10/12/2010    Malignant neoplasm of female breast ([facility] [address] 09/02/2010     Hx hypertension, dyslipidemia, colon ca (s/p hemicolectomy), breast ca--s/p bilat mastectomies, XRT/Chemo; No known cardiac hx. Episode of syncope after spell of abdominal pain, N/V, diarrhea, cramping and got up from commode--passed out. Initial ED w/u neg--told likely vasovagal. One episode 30-40 yrs ago. Seen by Dr. Babs Maki testing: ECHO 10/6/16--normal LV size/walls, LVEF 65-70, mild MR, RSVP 42. Carotid dopplers 91/5/49--7-78% DIONY, 71-31% LICA, bilat antegrade vertebrals. Holter monitor 10/16 with NSR, PAC's, one 3 beat PAT run. Had some issues with LE edema, improved after reduced Na. Over 2018 summer had confusion/weakness--prob TIA, at [facility] with neg CV w/u. Echo 7/18 with LVEF 04-24, grade I diastolic, valves ok.   No CV sx or complaints currently other than occ palpitations, vague discomfort related to stress/anxiety--non-exertional..  Continues to see PCP and Oncology with recent OV.  Last ECHO 8/26/20 with LVEF 50%, grade I diastolic, valves ok. Labs 6/9/21 with CBC normal, CMP normal except BUN 23 (Cr 0.8), chol 198, , HDL 56, , HbA1c 6.33     Plan:     Hx vasovagal syncope, no recurrence  Last ECHO 8/26/20 with LVEF 31%, grade I diastolic, valves ok  ECHO 10/6/16--normal LV size/walls, LVEF 65-70, mild MR, RSVP 42.   Echo 7/18 with LVEF 48-05, grade I diastolic, valves ok. Carotid dopplers 57/6/20--4-29% DIONY, 54-69% LICA, bilat antegrade vertebrals. Holter monitor 10/16 with NSR, PAC's, one 3 beat PAT run.      Atypical cp  Will evaluate with Jazmin  Continue ASA, CCB and statin  Pt defers any cardiac work up in sameera    HTN  Controlled with current therapy  Stable kidney fxn in 12/2021    HLD  9/2021   On atorva 40 mg daily Labs and lipids per PCP  6/2021       DM  On oral agent    Carotid artery disease  Carotid dopplers 40/5/40--7-82% DIONY, 32-26% LICA, bilat antegrade vertebrals.        Breast ca--s/p bilat mastectomies, XRT/Chemo;      Continue current care and f/u in 6 mos, sooner if needed    Maria Del Rosario Du NP

## 2022-03-09 ENCOUNTER — OFFICE VISIT (OUTPATIENT)
Dept: CARDIOLOGY CLINIC | Age: 72
End: 2022-03-09
Payer: MEDICARE

## 2022-03-09 VITALS
RESPIRATION RATE: 18 BRPM | HEIGHT: 61 IN | OXYGEN SATURATION: 98 % | BODY MASS INDEX: 29.83 KG/M2 | WEIGHT: 158 LBS | TEMPERATURE: 98.2 F | SYSTOLIC BLOOD PRESSURE: 140 MMHG | HEART RATE: 71 BPM | DIASTOLIC BLOOD PRESSURE: 82 MMHG

## 2022-03-09 DIAGNOSIS — E78.5 DYSLIPIDEMIA (HIGH LDL; LOW HDL): ICD-10-CM

## 2022-03-09 DIAGNOSIS — R07.9 CHEST PAIN, UNSPECIFIED TYPE: ICD-10-CM

## 2022-03-09 DIAGNOSIS — I65.23 BILATERAL CAROTID ARTERY STENOSIS: ICD-10-CM

## 2022-03-09 DIAGNOSIS — E11.8 CONTROLLED TYPE 2 DIABETES MELLITUS WITH COMPLICATION, WITHOUT LONG-TERM CURRENT USE OF INSULIN (HCC): ICD-10-CM

## 2022-03-09 DIAGNOSIS — Z86.73 HISTORY OF TRANSIENT ISCHEMIC ATTACK (TIA): ICD-10-CM

## 2022-03-09 DIAGNOSIS — I10 ESSENTIAL HYPERTENSION, BENIGN: Primary | ICD-10-CM

## 2022-03-09 PROCEDURE — G9711 PT HX TOT COL OR COLON CA: HCPCS | Performed by: NURSE PRACTITIONER

## 2022-03-09 PROCEDURE — G8753 SYS BP > OR = 140: HCPCS | Performed by: NURSE PRACTITIONER

## 2022-03-09 PROCEDURE — G8432 DEP SCR NOT DOC, RNG: HCPCS | Performed by: NURSE PRACTITIONER

## 2022-03-09 PROCEDURE — G8754 DIAS BP LESS 90: HCPCS | Performed by: NURSE PRACTITIONER

## 2022-03-09 PROCEDURE — G8536 NO DOC ELDER MAL SCRN: HCPCS | Performed by: NURSE PRACTITIONER

## 2022-03-09 PROCEDURE — 93000 ELECTROCARDIOGRAM COMPLETE: CPT | Performed by: NURSE PRACTITIONER

## 2022-03-09 PROCEDURE — 99214 OFFICE O/P EST MOD 30 MIN: CPT | Performed by: NURSE PRACTITIONER

## 2022-03-09 PROCEDURE — G8417 CALC BMI ABV UP PARAM F/U: HCPCS | Performed by: NURSE PRACTITIONER

## 2022-03-09 PROCEDURE — 2022F DILAT RTA XM EVC RTNOPTHY: CPT | Performed by: NURSE PRACTITIONER

## 2022-03-09 PROCEDURE — 1101F PT FALLS ASSESS-DOCD LE1/YR: CPT | Performed by: NURSE PRACTITIONER

## 2022-03-09 PROCEDURE — 1090F PRES/ABSN URINE INCON ASSESS: CPT | Performed by: NURSE PRACTITIONER

## 2022-03-09 PROCEDURE — G8427 DOCREV CUR MEDS BY ELIG CLIN: HCPCS | Performed by: NURSE PRACTITIONER

## 2022-03-09 PROCEDURE — 3046F HEMOGLOBIN A1C LEVEL >9.0%: CPT | Performed by: NURSE PRACTITIONER

## 2022-03-09 NOTE — PROGRESS NOTES
Identified pt with two pt identifiers(name and ). Reviewed record in preparation for visit and have obtained necessary documentation. Chief Complaint   Patient presents with    Hypertension     6 month follow up    Chest Pain     \"Event 2 weeks ago after eating cabbage soup. Rolaids relieved the pain. \"      Vitals:    22 1003   BP: (!) 140/82   Pulse: 71   Resp: 18   Temp: 98.2 °F (36.8 °C)   TempSrc: Temporal   SpO2: 98%   Weight: 158 lb (71.7 kg)   Height: 5' 1\" (1.549 m)   PainSc:   0 - No pain       Medications reviewed/approved by provider. Health Maintenance Review: Patient reminded of \"due or due soon\" health maintenance. I have asked the patient to contact his/her primary care provider (PCP) for follow-up on his/her health maintenance. Coordination of Care Questionnaire:  :   1) Have you been to an emergency room, urgent care, or hospitalized since your last visit? If yes, where when, and reason for visit? no       2. Have seen or consulted any other health care provider since your last visit? If yes, where when, and reason for visit? Yes, Dr. Burgess Patel for routine care. Patient is accompanied by self I have received verbal consent from Patricia Samson to discuss any/all medical information while they are present in the room.

## 2022-03-09 NOTE — LETTER
3/9/2022    Patient: Raul Novoa   YOB: 1950   Date of Visit: 3/9/2022     Trey Ordoñez MD  [facility] [id number]  Via Fax: [phone]    Dear Trey Ordoñze MD,      Thank you for referring Ms. Nisa Lunsford to [facility] for evaluation. My notes for this consultation are attached. If you have questions, please do not hesitate to call me. I look forward to following your patient along with you.       Sincerely,    Kieran Chao NP

## 2022-03-20 PROBLEM — M81.0 POSTMENOPAUSAL OSTEOPOROSIS: Status: ACTIVE | Noted: 2019-11-15

## 2022-03-20 PROBLEM — M85.80 OSTEOPENIA DUE TO CANCER THERAPY: Status: ACTIVE | Noted: 2017-06-15

## 2022-04-26 ENCOUNTER — TELEPHONE (OUTPATIENT)
Dept: SURGERY | Age: 72
End: 2022-04-26

## 2022-04-27 ENCOUNTER — OFFICE VISIT (OUTPATIENT)
Dept: SURGERY | Age: 72
End: 2022-04-27
Payer: MEDICARE

## 2022-04-27 VITALS
WEIGHT: 169 LBS | SYSTOLIC BLOOD PRESSURE: 143 MMHG | BODY MASS INDEX: 31.91 KG/M2 | HEIGHT: 61 IN | DIASTOLIC BLOOD PRESSURE: 70 MMHG | HEART RATE: 76 BPM

## 2022-04-27 DIAGNOSIS — Z80.0 FAMILY HISTORY OF COLON CANCER IN FATHER: ICD-10-CM

## 2022-04-27 DIAGNOSIS — Z85.038 HISTORY OF COLON CANCER: Primary | ICD-10-CM

## 2022-04-27 PROCEDURE — G8536 NO DOC ELDER MAL SCRN: HCPCS | Performed by: SURGERY

## 2022-04-27 PROCEDURE — G8756 NO BP MEASURE DOC: HCPCS | Performed by: SURGERY

## 2022-04-27 PROCEDURE — G8427 DOCREV CUR MEDS BY ELIG CLIN: HCPCS | Performed by: SURGERY

## 2022-04-27 PROCEDURE — G8432 DEP SCR NOT DOC, RNG: HCPCS | Performed by: SURGERY

## 2022-04-27 PROCEDURE — G8417 CALC BMI ABV UP PARAM F/U: HCPCS | Performed by: SURGERY

## 2022-04-27 PROCEDURE — 1090F PRES/ABSN URINE INCON ASSESS: CPT | Performed by: SURGERY

## 2022-04-27 PROCEDURE — G9711 PT HX TOT COL OR COLON CA: HCPCS | Performed by: SURGERY

## 2022-04-27 PROCEDURE — 1101F PT FALLS ASSESS-DOCD LE1/YR: CPT | Performed by: SURGERY

## 2022-04-27 PROCEDURE — 99203 OFFICE O/P NEW LOW 30 MIN: CPT | Performed by: SURGERY

## 2022-04-27 NOTE — PROGRESS NOTES
Debo Alexander is a 66 y.o. female who presents today with the following:  No chief complaint on file. HPI    68-year-old female who presents to us as a referral by Dr. Coleen Huertas for possible surveillance colonoscopy. Her last colonoscopy was about 6 years ago. She states that the findings at that point were diverticulosis and internal hemorrhoids. She has a personal history of colon cancer and had a hemicolectomy. She is not sure which side but it appears that it may have been a left hemicolectomy. This was back in 1998. She also has a family history of colon cancer and that her father had colon cancer at age 77. She denies any melena or hematochezia or diarrhea or constipation. She denies any abdominal pain or unexpected weight loss. She has had no change in the caliber of her stool and no recent stool testing. In addition to her hemicolectomy she has had a left oophorectomy and an appendectomy. She has had bilateral mastectomies for breast cancer. She had a small bowel obstruction that did not require operative intervention and does have a history of endometriosis. She is a diabetic on oral medications. She does have a history of hypertension hypercholesterolemia. She stopped smoking in 1981 does not drink alcohol. She is on an 81 mg aspirin a day. She has been vaccinated for COVID and has not contracted COVID.   Past Medical History:   Diagnosis Date    Arthritis     Breast CA ([address]     left    Cancer ([facility] [address] 1998    colon cancer    Dyslipidemia (high LDL; low HDL) 10/12/2010    Essential hypertension, benign 10/12/2010    Family history of coronary artery disease 10/12/2010    FH: bowel obstruction 9/1/2015    Hypercholesteremia     Hypertension     Ill-defined condition     elevated cholesterol    Postmenopausal     S/P chemotherapy, time since greater than 12 weeks     S/P left hemicolectomy 10/12/2010       Past Surgical History:   Procedure Laterality Date    HX APPENDECTOMY      HX COLONOSCOPY      HX DILATION AND CURETTAGE      HX GYN  2000    uterine ablation    HX OOPHORECTOMY      part of left ovary removed    HX OTHER SURGICAL  2011    port a cath    HX SALPINGO-OOPHORECTOMY      HX TOTAL COLECTOMY      MS BREAST SURGERY PROCEDURE UNLISTED  2010    double mastectomy       Social History     Socioeconomic History    Marital status:      Spouse name: Not on file    Number of children: Not on file    Years of education: Not on file    Highest education level: Not on file   Occupational History    Not on file   Tobacco Use    Smoking status: Former Smoker     Packs/day: 0.50     Years: 10.00     Pack years: 5.00     Quit date: 1980     Years since quittin.3    Smokeless tobacco: Never Used    Tobacco comment: quit 29 years ago   Substance and Sexual Activity    Alcohol use: No    Drug use: No    Sexual activity: Not on file     Comment: menses age 13, menopause 44 age, first birth 32,    Other Topics Concern    Not on file   Social History Narrative    Not on file     Social Determinants of Health     Financial Resource Strain:     Difficulty of Paying Living Expenses: Not on file   Food Insecurity:     Worried About Running Out of Food in the Last Year: Not on file    Lynn of Food in the Last Year: Not on file   Transportation Needs:     Lack of Transportation (Medical): Not on file    Lack of Transportation (Non-Medical):  Not on file   Physical Activity:     Days of Exercise per Week: Not on file    Minutes of Exercise per Session: Not on file   Stress:     Feeling of Stress : Not on file   Social Connections:     Frequency of Communication with Friends and Family: Not on file    Frequency of Social Gatherings with Friends and Family: Not on file    Attends Jainism Services: Not on file    Active Member of Clubs or Organizations: Not on file    Attends Club or Organization Meetings: Not on file   [facility] Marital Status: Not on file   Intimate Partner Violence:     Fear of Current or Ex-Partner: Not on file    Emotionally Abused: Not on file    Physically Abused: Not on file    Sexually Abused: Not on file   Housing Stability:     Unable to Pay for Housing in the Last Year: Not on file    Number of [place] in the Last Year: Not on file    Unstable Housing in the Last Year: Not on file       Family History   Problem Relation Age of Onset    Cancer Father 79        colon    Hypertension Mother     Arthritis-rheumatoid Mother     Elevated Lipids Mother     No Known Problems Sister     Elevated Lipids Brother     Hypertension Brother     Hypertension Sister     Elevated Lipids Sister     No Known Problems Brother     Heart Disease Brother        No Known Allergies    Current Outpatient Medications   Medication Sig    OTHER Mastectomy Bra and prosthesis x 3   Dx: Breast Cancer    aspirin delayed-release 81 mg tablet Take 1 Tab by mouth daily.  metFORMIN ER (GLUCOPHAGE XR) 500 mg tablet Take 1 Tab by mouth daily.  CALCIUM CARBONATE/VITAMIN D3 (CALTRATE 600 + D PO) Take 2 Caplet by mouth daily.  Denosumab (PROLIA) 60 mg/mL injection 60 mg by SubCUTAneous route. Every 6 months   Indications: POST-MENOPAUSAL OSTEOPOROSIS    magnesium oxide (MAG-OX) 400 mg tablet Take 400 mg by mouth daily.  atorvastatin (LIPITOR) 40 mg tablet Take 40 mg by mouth daily. Takes 60 mg daily    amlodipine (NORVASC) 10 mg tablet Take 10 mg by mouth daily. Indications: HYPERTENSION    multivitamins-minerals-lutein (CENTRUM SILVER) Tab Take  by mouth daily. No current facility-administered medications for this visit. The above histories, medications and allergies have been reviewed. ROS    There were no vitals taken for this visit. Physical Exam  Constitutional:       Appearance: Normal appearance. Cardiovascular:      Rate and Rhythm: Normal rate and regular rhythm.    Pulmonary:      Effort: No respiratory distress. Breath sounds: Normal breath sounds. Abdominal:      General: There is no distension. Palpations: Abdomen is soft. There is no mass. Tenderness: There is no abdominal tenderness. Comments: Scars consistent with surgical history   Neurological:      Mental Status: She is alert. 1. History of colon cancer  Recommend colonoscopy. The procedure was explained in detail including the risks and benefits. Risks shared included risks of missed lesions, incomplete exam, colon injury or perforation. Risks associated with anesthesia were also discussed. The patient wishes to proceed and we will schedule. 2. Family history of colon cancer in father  See #1    The patient was counseled at length about the risks of jaron Covid-19 during their perioperative period and any recovery window from their procedure. The patient was made aware that jaron Covid-19  may worsen their prognosis for recovering from their procedure and lend to a higher morbidity and/or mortality risk. All material risks, benefits, and reasonable alternatives including postponing the procedure were discussed. The patient does  wish to proceed with the procedure at this time. Follow-up and Dispositions    · Return for post procedure.          Tiffany Dinh MD

## 2022-04-27 NOTE — LETTER
4/27/2022    Patient: Manjeet Alex   YOB: 1950   Date of Visit: 4/27/2022     Rebeca Blevins MD  [facility] [phone] [id number]  Via Fax: [phone]    Dear Rebeca Blevins MD,      Thank you for referring Ms. Martha Mora to [address] [place] for evaluation. My notes for this consultation are attached. If you have questions, please do not hesitate to call me. I look forward to following your patient along with you.       Sincerely,    Huseyin Talamantes MD

## 2022-04-27 NOTE — PATIENT INSTRUCTIONS
Learning About Colonoscopy  What is a colonoscopy? A colonoscopy is a test (also called a procedure) that lets a doctor look inside your large intestine. The doctor uses a thin, lighted tube called a colonoscope. The doctor uses it to look for small growths called polyps, colon or rectal cancer (colorectal cancer), or other problems like bleeding. During the procedure, the doctor can take samples of tissue. The samples can then be checked for cancer or other conditions. The doctor can also take out polyps. How is a colonoscopy done? This procedure is done in a doctor's office or a clinic or hospital. You will get medicine to help you relax and not feel pain. Some people find that they don't remember having the test because of the medicine. The doctor gently moves the colonoscope, or scope, through the colon. The scope is also a small video camera. It lets the doctor see the colon and take pictures. How do you prepare for the procedure? You need to clean out your colon before the procedure so the doctor can see your colon. This depends on which \"colon prep\" your doctor recommends. To clean out your colon, you'll do a \"colon prep\" before the test. This means you stop eating solid foods and drink only clear liquids. You can have water, tea, coffee, clear juices, clear broths, flavored ice pops, and gelatin (such as Jell-O). Do not drink anything red or purple. The day or night before the procedure, you drink a large amount of a special liquid. This causes loose, frequent stools. You will go to the bathroom a lot. Your doctor may have you drink part of the liquid the evening before and the rest on the day of the test. It's very important to drink all of the liquid. If you have problems drinking it, call your doctor. Arrange to have someone take you home after the test.  What can you expect after a colonoscopy? Your doctor will tell you when you can eat and do your usual activities.   Drink a lot of fluid after the test to replace the fluids you may have lost during the colon prep. But don't drink alcohol. Your doctor will talk to you about when you'll need your next colonoscopy. The results of your test and your risk for colorectal cancer will help your doctor decide how often you need to be checked. After the test, you may be bloated or have gas pains. You may need to pass gas. If a biopsy was done or a polyp was removed, you may have streaks of blood in your stool (feces) for a few days. Check with your doctor to see when it is safe to take aspirin and nonsteroidal anti-inflammatory drugs (NSAIDs) again. Problems such as heavy rectal bleeding may not occur until several weeks after the test. This isn't common. But it can happen after polyps are removed. Follow-up care is a key part of your treatment and safety. Be sure to make and go to all appointments, and call your doctor if you are having problems. It's also a good idea to know your test results and keep a list of the medicines you take. Where can you learn more? Go to http://www.gray.com/  Enter Z368 in the search box to learn more about \"Learning About Colonoscopy. \"  Current as of: September 8, 2021               Content Version: 13.2  © 2006-2022 [employer]. Care instructions adapted under license by [employer] (which disclaims liability or warranty for this information). If you have questions about a medical condition or this instruction, always ask your healthcare professional. [place] [address] any warranty or liability for your use of this information.

## 2022-05-09 ENCOUNTER — TELEPHONE (OUTPATIENT)
Dept: NON INVASIVE DIAGNOSTICS | Age: 72
End: 2022-05-09

## 2022-05-09 NOTE — TELEPHONE ENCOUNTER
Called to inform pt of pending requirements for nuc stress scheduled on 5/12.   Pt able to state positive understanding and will arrive at julianne 940 to 945am

## 2022-05-12 ENCOUNTER — HOSPITAL ENCOUNTER (OUTPATIENT)
Dept: NUCLEAR MEDICINE | Age: 72
Discharge: HOME | End: 2022-05-12
Attending: NURSE PRACTITIONER
Payer: MEDICARE

## 2022-05-12 ENCOUNTER — HOSPITAL ENCOUNTER (OUTPATIENT)
Dept: NON INVASIVE DIAGNOSTICS | Age: 72
Discharge: HOME | End: 2022-05-12
Attending: NURSE PRACTITIONER
Payer: MEDICARE

## 2022-05-12 DIAGNOSIS — I65.23 BILATERAL CAROTID ARTERY STENOSIS: ICD-10-CM

## 2022-05-12 DIAGNOSIS — Z86.73 HISTORY OF TRANSIENT ISCHEMIC ATTACK (TIA): ICD-10-CM

## 2022-05-12 DIAGNOSIS — R07.9 CHEST PAIN, UNSPECIFIED TYPE: ICD-10-CM

## 2022-05-12 DIAGNOSIS — E11.8 CONTROLLED TYPE 2 DIABETES MELLITUS WITH COMPLICATION, WITHOUT LONG-TERM CURRENT USE OF INSULIN (HCC): ICD-10-CM

## 2022-05-12 DIAGNOSIS — E78.5 DYSLIPIDEMIA (HIGH LDL; LOW HDL): ICD-10-CM

## 2022-05-12 DIAGNOSIS — I10 ESSENTIAL HYPERTENSION, BENIGN: ICD-10-CM

## 2022-05-12 PROCEDURE — A9500 TC99M SESTAMIBI: HCPCS

## 2022-05-12 PROCEDURE — 93017 CV STRESS TEST TRACING ONLY: CPT

## 2022-05-12 PROCEDURE — 74011250636 HC RX REV CODE- 250/636: Performed by: NURSE PRACTITIONER

## 2022-05-12 RX ORDER — TETRAKIS(2-METHOXYISOBUTYLISOCYANIDE)COPPER(I) TETRAFLUOROBORATE 1 MG/ML
10.5 INJECTION, POWDER, LYOPHILIZED, FOR SOLUTION INTRAVENOUS
Status: COMPLETED | OUTPATIENT
Start: 2022-05-12 | End: 2022-05-12

## 2022-05-12 RX ORDER — TETRAKIS(2-METHOXYISOBUTYLISOCYANIDE)COPPER(I) TETRAFLUOROBORATE 1 MG/ML
32.1 INJECTION, POWDER, LYOPHILIZED, FOR SOLUTION INTRAVENOUS
Status: COMPLETED | OUTPATIENT
Start: 2022-05-12 | End: 2022-05-12

## 2022-05-12 RX ADMIN — TETRAKIS(2-METHOXYISOBUTYLISOCYANIDE)COPPER(I) TETRAFLUOROBORATE 10.5 MILLICURIE: 1 INJECTION, POWDER, LYOPHILIZED, FOR SOLUTION INTRAVENOUS at 10:20

## 2022-05-12 RX ADMIN — REGADENOSON 0.4 MG: 0.08 INJECTION, SOLUTION INTRAVENOUS at 11:24

## 2022-05-12 RX ADMIN — TETRAKIS(2-METHOXYISOBUTYLISOCYANIDE)COPPER(I) TETRAFLUOROBORATE 32.1 MILLICURIE: 1 INJECTION, POWDER, LYOPHILIZED, FOR SOLUTION INTRAVENOUS at 11:25

## 2022-05-14 LAB
STRESS BASELINE DIAS BP: 73 MMHG
STRESS BASELINE HR: 87 BPM
STRESS BASELINE SYS BP: 179 MMHG
STRESS ESTIMATED WORKLOAD: 1 METS
STRESS EXERCISE DUR MIN: 7 MIN
STRESS EXERCISE DUR SEC: 26 SEC
STRESS PEAK DIAS BP: 71 MMHG
STRESS PEAK SYS BP: NORMAL MMHG
STRESS PERCENT HR ACHIEVED: 80 %
STRESS POST PEAK HR: 118 BPM
STRESS TARGET HR: 147 BPM

## 2022-05-23 ENCOUNTER — TELEPHONE (OUTPATIENT)
Dept: CARDIOLOGY CLINIC | Age: 72
End: 2022-05-23

## 2022-05-23 NOTE — TELEPHONE ENCOUNTER
Spoke with the patient. Verified patient with two patient identifiers. Results given and questions answered. Patient verbalized understanding. Attending Attestation (For Attendings USE Only)...

## 2022-05-23 NOTE — TELEPHONE ENCOUNTER
----- Message from Lucy Chacon LPN sent at 2/71/6177 10:39 AM EDT -----    ----- Message -----  From: Ramos Kan NP  Sent: 5/15/2022   4:00 PM EDT  To: Lucy Chacon LPN    Please advise patient stress test without evidence of flow-limiting blockages in the arteries of the heart.

## 2022-06-02 ENCOUNTER — DOCUMENTATION ONLY (OUTPATIENT)
Dept: ONCOLOGY | Age: 72
End: 2022-06-02

## 2022-06-02 NOTE — PROGRESS NOTES
Patient agreed to follow up with PCP. Patient stated that she gets Prolia injections & will check with PCP to see if she will keep up with the order for Prolia.

## 2022-06-06 ENCOUNTER — HOSPITAL ENCOUNTER (OUTPATIENT)
Dept: GENERAL RADIOLOGY | Age: 72
Discharge: HOME | End: 2022-06-06
Payer: MEDICARE

## 2022-06-06 ENCOUNTER — TRANSCRIBE ORDER (OUTPATIENT)
Dept: REGISTRATION | Age: 72
End: 2022-06-06

## 2022-06-06 DIAGNOSIS — M25.561 RIGHT KNEE PAIN: ICD-10-CM

## 2022-06-06 DIAGNOSIS — M25.551 RIGHT HIP PAIN: ICD-10-CM

## 2022-06-06 DIAGNOSIS — M25.551 RIGHT HIP PAIN: Primary | ICD-10-CM

## 2022-06-06 PROCEDURE — 73564 X-RAY EXAM KNEE 4 OR MORE: CPT

## 2022-06-06 PROCEDURE — 73502 X-RAY EXAM HIP UNI 2-3 VIEWS: CPT

## 2022-06-08 ENCOUNTER — HOSPITAL ENCOUNTER (OUTPATIENT)
Dept: INFUSION THERAPY | Age: 72
Discharge: HOME | End: 2022-06-08
Payer: MEDICARE

## 2022-06-08 VITALS
HEART RATE: 74 BPM | OXYGEN SATURATION: 98 % | DIASTOLIC BLOOD PRESSURE: 70 MMHG | BODY MASS INDEX: 31.37 KG/M2 | RESPIRATION RATE: 17 BRPM | WEIGHT: 166 LBS | SYSTOLIC BLOOD PRESSURE: 165 MMHG | TEMPERATURE: 98.6 F

## 2022-06-08 PROCEDURE — 74011250636 HC RX REV CODE- 250/636: Performed by: INTERNAL MEDICINE

## 2022-06-08 PROCEDURE — 96372 THER/PROPH/DIAG INJ SC/IM: CPT

## 2022-06-08 RX ORDER — POTASSIUM CITRATE 10 MEQ/1
TABLET, EXTENDED RELEASE ORAL
COMMUNITY

## 2022-06-08 RX ADMIN — DENOSUMAB 60 MG: 60 INJECTION SUBCUTANEOUS at 09:25

## 2022-06-08 NOTE — PROGRESS NOTES
[facility] Progress Note    Date: 2022    Name: Nikkie Tom    MRN: [id number]         : 1950      Ms. Denver Quivers was assessed. Ms. Blanca Reyes vitals were reviewed and patient was observed for 5 minutes prior to treatment. Visit Vitals  BP (!) 165/70   Pulse 74   Temp 98.6 °F (37 °C)   Resp 17   Wt 75.3 kg (166 lb)   SpO2 98%   Breastfeeding No   BMI 31.37 kg/m²       Lab results were obtained from MD office and reviewed. Prolia was administered subcutaneous in  R upper arm. Ms. Denver Quivers tolerated well, and had no complaints. Patient armband removed and shredded. Ms. Denver Quivers was discharged from [place] [address] in stable condition at 0930. She is to return on 22 at 1000 for her next appointment.     Miguel Camacho RN  2022  9:16 AM
13-Jun-2019 11:45

## 2022-06-23 ENCOUNTER — ANESTHESIA EVENT (OUTPATIENT)
Dept: SURGERY | Age: 72
End: 2022-06-23
Payer: MEDICARE

## 2022-06-23 NOTE — ANESTHESIA PREPROCEDURE EVALUATION
Relevant Problems   CARDIOVASCULAR   (+) Essential hypertension, benign      PERSONAL HX & FAMILY HX OF CANCER   (+) Malignant neoplasm of female breast [facility])       Anesthetic History               Review of Systems / Medical History  Patient summary reviewed, nursing notes reviewed and pertinent labs reviewed    Pulmonary              Pertinent negatives: No smoker (former, quit 1980)     Neuro/Psych         TIA (7/2018 L arm uncoordination x 30 min )     Cardiovascular    Hypertension          Past MI and hyperlipidemia      Comments: Nuclear stress test 5/2022: Jazmin Nuclear Stress Test  Pt presenting h/o atypical chest pains  Pt completed the routine Jazmin Protocol  Hemodynamic results were normal  Pt had c/o dyspnea, abdominal cramps, and HA  No significant ST changes or ectopy  No complications  No EKG findings to suggest ishcemia  Negative Stress Test for Ischemia      TIA work-up 2018: The patient underwent a carotid Doppler that was essentially unremarkable. CT head with Small area old infarct is noted in left basal ganglia area. She underwent MRI and MRA of the brain with no evidence acute ischemic change. Moderate old infarct in periventricular white matter anteriorly on the left with mild chronic ischemic change in the deep white matter bilaterally. No mass lesions or abnormal enhancement.     GI/Hepatic/Renal                Endo/Other    Diabetes    Arthritis and cancer (colon, breast)     Other Findings            Physical Exam    Airway  Mallampati: I  TM Distance: > 6 cm  Neck ROM: normal range of motion   Mouth opening: Normal     Cardiovascular    Rhythm: regular  Rate: normal         Dental    Dentition: Upper partial plate     Pulmonary  Breath sounds clear to auscultation               Abdominal  GI exam deferred       Other Findings            Anesthetic Plan    ASA: 2  Anesthesia type: MAC          Induction: Intravenous  Anesthetic plan and risks discussed with: Patient

## 2022-07-07 NOTE — PERIOP NOTES
[address] [place] [address]  SURGICAL PRE-ADMISSION INSTRUCTIONS    ARRIVAL  · You will be called the day before your surgery with your expected arrival time. · Sign in at the  of the hospital.  You will be directed to the Surgical Waiting Room. · Please arrive at your scheduled appointment time. You have been scheduled to arrive for your procedure one or two hours prior to the expected start time of your procedure. · Every effort will be made to minimize your wait but please be aware that unforeseen circumstances may affect our schedule. EATING  · DO NOT EAT OR DRINK ANYTHING AFTER MIDNIGHT ON THE EVENING BEFORE YOUR SURGERY OR ON THE DAY OF YOUR SURGERY except for your medications (as instructed) with a sip of water. · Do not use gum, mints or lozenges on the morning of your surgery. · Please do not smoke or chew tobacco before your surgery. MEDICATIONS   · amlodipine    STOP THESE MEDICATIONS AT THE TIMES LISTED BELOW  aspirin     DRIVING/TRANSPORATION  · Have a responsible adult to drive you home from the hospital and to stay with you over night. Please have them plan to remain in the hospital during your surgery. Your surgery will not be done if you do not have a responsible adult to take you home and to stay with you. · If you have arranged for public transport, you must have a responsible adult to ride with you (who is not the ). · You may not drive for 24 hours after anesthesia. PREPARATION  · If you have a Living WiIl/Advance Directive, please bring a copy with you to scan into your chart. · Please DO NOT wear makeup or nail polish  · Please leave valuables at home,  DO NOT wear jewelry. · Wear loose, comfortable clothing that is large enough to cover a bulky dressing.     SPECIAL INSTRUCTIONS:  none    Patient:  Britni Earl   Date:     July 7, 2022  Time:   10:25 AM    RN:  Daquan Kuhn RN    Date:     July 7, 2022  Time:   10:25 AM

## 2022-07-13 NOTE — H&P
History and Physical    HPI    75-year-old female who presents to us as a referral by Dr. Charly Germain for possible surveillance colonoscopy. Her last colonoscopy was about 6 years ago. She states that the findings at that point were diverticulosis and internal hemorrhoids. She has a personal history of colon cancer and had a hemicolectomy. She is not sure which side but it appears that it may have been a left hemicolectomy. This was back in 1998. She also has a family history of colon cancer and that her father had colon cancer at age 77. She denies any melena or hematochezia or diarrhea or constipation. She denies any abdominal pain or unexpected weight loss. She has had no change in the caliber of her stool and no recent stool testing. In addition to her hemicolectomy she has had a left oophorectomy and an appendectomy. She has had bilateral mastectomies for breast cancer. She had a small bowel obstruction that did not require operative intervention and does have a history of endometriosis. She is a diabetic on oral medications. She does have a history of hypertension hypercholesterolemia. She stopped smoking in 1981 does not drink alcohol. She is on an 81 mg aspirin a day. She has been vaccinated for COVID and has not contracted COVID.   Past Medical History:   Diagnosis Date    Arthritis     Breast CA ([address]     left    Cancer ([address] 1998    colon cancer    Diabetes [address] [address] [facility]     DM ll    Dyslipidemia (high LDL; low HDL) 10/12/2010    Essential hypertension, benign 10/12/2010    Family history of coronary artery disease 10/12/2010    FH: bowel obstruction 9/1/2015    Hypercholesteremia     Hypertension     Ill-defined condition     elevated cholesterol    Postmenopausal     S/P chemotherapy, time since greater than 12 weeks     S/P left hemicolectomy 10/12/2010       Past Surgical History:   Procedure Laterality Date    HX APPENDECTOMY      HX COLONOSCOPY      HX DILATION AND CURETTAGE  1977    HX GYN  2000    uterine ablation    HX OOPHORECTOMY  1977    part of left ovary removed    HX OTHER SURGICAL  2011    port a cath    HX SALPINGO-OOPHORECTOMY      HX TOTAL COLECTOMY  1998    [place] BREAST SURGERY PROCEDURE UNLISTED  2010    double mastectomy       Social History     Socioeconomic History    Marital status:      Spouse name: Not on file    Number of children: Not on file    Years of education: Not on file    Highest education level: Not on file   Occupational History    Not on file   Tobacco Use    Smoking status: Former Smoker     Packs/day: 0.50     Years: 10.00     Pack years: 5.00     Quit date: 1980     Years since quittin.5    Smokeless tobacco: Never Used    Tobacco comment: quit 29 years ago   Substance and Sexual Activity    Alcohol use: No    Drug use: No    Sexual activity: Not on file     Comment: menses age 13, menopause 44 age, first birth 32,    Other Topics Concern    Not on file   Social History Narrative    Not on file     Social Determinants of Health     Financial Resource Strain:     Difficulty of Paying Living Expenses: Not on file   Food Insecurity:     Worried About Running Out of Food in the Last Year: Not on file    Lynn of Food in the Last Year: Not on file   Transportation Needs:     Lack of Transportation (Medical): Not on file    Lack of Transportation (Non-Medical):  Not on file   Physical Activity:     Days of Exercise per Week: Not on file    Minutes of Exercise per Session: Not on file   Stress:     Feeling of Stress : Not on file   Social Connections:     Frequency of Communication with Friends and Family: Not on file    Frequency of Social Gatherings with Friends and Family: Not on file    Attends Faith Services: Not on file    Active Member of Clubs or Organizations: Not on file    Attends Club or Organization Meetings: Not on file    Marital Status: Not on file   Intimate Partner Violence:  Fear of Current or Ex-Partner: Not on file    Emotionally Abused: Not on file    Physically Abused: Not on file    Sexually Abused: Not on file   Housing Stability:     Unable to Pay for Housing in the Last Year: Not on file    Number of Places Lived in the Last Year: Not on file    Unstable Housing in the Last Year: Not on file       Family History   Problem Relation Age of Onset    Cancer Father 79        colon    Hypertension Mother     Arthritis-rheumatoid Mother    Jose Self Elevated Lipids Mother     No Known Problems Sister     Elevated Lipids Brother     Hypertension Brother     Hypertension Sister     Elevated Lipids Sister     No Known Problems Brother     Heart Disease Brother        No Known Allergies    No current facility-administered medications for this encounter. Current Outpatient Medications   Medication Sig    aspirin delayed-release 81 mg tablet Take 1 Tab by mouth daily.  metFORMIN ER (GLUCOPHAGE XR) 500 mg tablet Take 1 Tab by mouth daily.  CALCIUM CARBONATE/VITAMIN D3 (CALTRATE 600 + D PO) Take 2 Caplet by mouth daily.  Denosumab (PROLIA) 60 mg/mL injection 60 mg by SubCUTAneous route. Every 6 months   Indications: POST-MENOPAUSAL OSTEOPOROSIS    magnesium oxide (MAG-OX) 400 mg tablet Take 400 mg by mouth daily.  atorvastatin (LIPITOR) 40 mg tablet Take 40 mg by mouth daily. Takes 60 mg daily    amlodipine (NORVASC) 10 mg tablet Take 10 mg by mouth daily. Indications: HYPERTENSION    multivitamins-minerals-lutein (CENTRUM SILVER) Tab Take  by mouth daily.  potassium citrate (UROCIT-K10) 10 mEq (1,080 mg) TbER Take  by mouth. (Patient not taking: Reported on 7/7/2022)    OTHER Mastectomy Bra and prosthesis x 3   Dx: Breast Cancer       The above histories, medications and allergies have been reviewed. ROS    There were no vitals taken for this visit. Physical Exam  Constitutional:       Appearance: Normal appearance.    Cardiovascular:      Rate and Rhythm: Normal rate and regular rhythm. Pulmonary:      Effort: No respiratory distress. Breath sounds: Normal breath sounds. Abdominal:      General: There is no distension. Palpations: Abdomen is soft. There is no mass. Tenderness: There is no abdominal tenderness. Comments: Scars consistent with surgical history   Neurological:      Mental Status: She is alert. 1. History of colon cancer  Recommend colonoscopy. The procedure was explained in detail including the risks and benefits. Risks shared included risks of missed lesions, incomplete exam, colon injury or perforation. Risks associated with anesthesia were also discussed. The patient wishes to proceed and we will schedule. 2. Family history of colon cancer in father  See #1    The patient was counseled at length about the risks of jaron Covid-19 during their perioperative period and any recovery window from their procedure. The patient was made aware that jaron Covid-19  may worsen their prognosis for recovering from their procedure and lend to a higher morbidity and/or mortality risk. All material risks, benefits, and reasonable alternatives including postponing the procedure were discussed. The patient does  wish to proceed with the procedure at this time.               Erika Driver MD

## 2022-07-14 ENCOUNTER — ANESTHESIA (OUTPATIENT)
Dept: SURGERY | Age: 72
End: 2022-07-14
Payer: MEDICARE

## 2022-07-14 ENCOUNTER — HOSPITAL ENCOUNTER (OUTPATIENT)
Age: 72
Setting detail: OUTPATIENT SURGERY
Discharge: HOME | End: 2022-07-14
Attending: SURGERY | Admitting: SURGERY
Payer: MEDICARE

## 2022-07-14 VITALS
RESPIRATION RATE: 21 BRPM | DIASTOLIC BLOOD PRESSURE: 70 MMHG | OXYGEN SATURATION: 99 % | TEMPERATURE: 97.7 F | HEART RATE: 74 BPM | SYSTOLIC BLOOD PRESSURE: 121 MMHG

## 2022-07-14 DIAGNOSIS — Z90.49 S/P LEFT HEMICOLECTOMY: ICD-10-CM

## 2022-07-14 LAB
GLUCOSE BLD STRIP.AUTO-MCNC: 129 MG/DL (ref 65–117)
SERVICE CMNT-IMP: ABNORMAL

## 2022-07-14 PROCEDURE — 82962 GLUCOSE BLOOD TEST: CPT

## 2022-07-14 PROCEDURE — 88305 TISSUE EXAM BY PATHOLOGIST: CPT

## 2022-07-14 PROCEDURE — 45385 COLONOSCOPY W/LESION REMOVAL: CPT | Performed by: SURGERY

## 2022-07-14 PROCEDURE — 76210000063 HC OR PH I REC FIRST 0.5 HR: Performed by: SURGERY

## 2022-07-14 PROCEDURE — 76060000032 HC ANESTHESIA 0.5 TO 1 HR: Performed by: SURGERY

## 2022-07-14 PROCEDURE — 76010000138 HC OR TIME 0.5 TO 1 HR: Performed by: SURGERY

## 2022-07-14 PROCEDURE — 74011250636 HC RX REV CODE- 250/636: Performed by: ANESTHESIOLOGY

## 2022-07-14 PROCEDURE — 77030037041 HC FCPS HOT BIOP ENDOSC RAD JAW DISP BSC -B: Performed by: SURGERY

## 2022-07-14 PROCEDURE — 74011000250 HC RX REV CODE- 250: Performed by: ANESTHESIOLOGY

## 2022-07-14 PROCEDURE — 77030013992 HC SNR POLYP ENDOSC BSC -B: Performed by: SURGERY

## 2022-07-14 PROCEDURE — 74011250636 HC RX REV CODE- 250/636: Performed by: SURGERY

## 2022-07-14 PROCEDURE — 2709999900 HC NON-CHARGEABLE SUPPLY: Performed by: SURGERY

## 2022-07-14 RX ORDER — LIDOCAINE HYDROCHLORIDE 20 MG/ML
INJECTION, SOLUTION EPIDURAL; INFILTRATION; INTRACAUDAL; PERINEURAL AS NEEDED
Status: DISCONTINUED | OUTPATIENT
Start: 2022-07-14 | End: 2022-07-14 | Stop reason: HOSPADM

## 2022-07-14 RX ORDER — MIDAZOLAM HYDROCHLORIDE 1 MG/ML
INJECTION, SOLUTION INTRAMUSCULAR; INTRAVENOUS AS NEEDED
Status: DISCONTINUED | OUTPATIENT
Start: 2022-07-14 | End: 2022-07-14 | Stop reason: HOSPADM

## 2022-07-14 RX ORDER — PROPOFOL 10 MG/ML
INJECTION, EMULSION INTRAVENOUS AS NEEDED
Status: DISCONTINUED | OUTPATIENT
Start: 2022-07-14 | End: 2022-07-14 | Stop reason: HOSPADM

## 2022-07-14 RX ORDER — SODIUM CHLORIDE, SODIUM LACTATE, POTASSIUM CHLORIDE, CALCIUM CHLORIDE 600; 310; 30; 20 MG/100ML; MG/100ML; MG/100ML; MG/100ML
125 INJECTION, SOLUTION INTRAVENOUS CONTINUOUS
Status: DISCONTINUED | OUTPATIENT
Start: 2022-07-14 | End: 2022-07-14 | Stop reason: HOSPADM

## 2022-07-14 RX ORDER — SODIUM CHLORIDE 0.9 % (FLUSH) 0.9 %
5-40 SYRINGE (ML) INJECTION AS NEEDED
Status: DISCONTINUED | OUTPATIENT
Start: 2022-07-14 | End: 2022-07-14 | Stop reason: HOSPADM

## 2022-07-14 RX ORDER — SODIUM CHLORIDE 0.9 % (FLUSH) 0.9 %
5-40 SYRINGE (ML) INJECTION EVERY 8 HOURS
Status: DISCONTINUED | OUTPATIENT
Start: 2022-07-14 | End: 2022-07-14 | Stop reason: HOSPADM

## 2022-07-14 RX ADMIN — MIDAZOLAM 2 MG: 1 INJECTION INTRAMUSCULAR; INTRAVENOUS at 08:25

## 2022-07-14 RX ADMIN — PROPOFOL 20 MG: 10 INJECTION, EMULSION INTRAVENOUS at 08:38

## 2022-07-14 RX ADMIN — PROPOFOL 20 MG: 10 INJECTION, EMULSION INTRAVENOUS at 08:32

## 2022-07-14 RX ADMIN — PROPOFOL 20 MG: 10 INJECTION, EMULSION INTRAVENOUS at 08:49

## 2022-07-14 RX ADMIN — LIDOCAINE HYDROCHLORIDE 20 MG: 20 INJECTION, SOLUTION EPIDURAL; INFILTRATION; INTRACAUDAL; PERINEURAL at 08:25

## 2022-07-14 RX ADMIN — PROPOFOL 20 MG: 10 INJECTION, EMULSION INTRAVENOUS at 08:35

## 2022-07-14 RX ADMIN — SODIUM CHLORIDE, POTASSIUM CHLORIDE, SODIUM LACTATE AND CALCIUM CHLORIDE 125 ML/HR: 600; 310; 30; 20 INJECTION, SOLUTION INTRAVENOUS at 07:37

## 2022-07-14 RX ADMIN — PROPOFOL 20 MG: 10 INJECTION, EMULSION INTRAVENOUS at 08:40

## 2022-07-14 RX ADMIN — PROPOFOL 20 MG: 10 INJECTION, EMULSION INTRAVENOUS at 08:43

## 2022-07-14 RX ADMIN — PROPOFOL 30 MG: 10 INJECTION, EMULSION INTRAVENOUS at 08:27

## 2022-07-14 RX ADMIN — PROPOFOL 40 MG: 10 INJECTION, EMULSION INTRAVENOUS at 08:25

## 2022-07-14 RX ADMIN — PROPOFOL 20 MG: 10 INJECTION, EMULSION INTRAVENOUS at 08:52

## 2022-07-14 RX ADMIN — PROPOFOL 20 MG: 10 INJECTION, EMULSION INTRAVENOUS at 08:46

## 2022-07-14 RX ADMIN — PROPOFOL 30 MG: 10 INJECTION, EMULSION INTRAVENOUS at 08:29

## 2022-07-14 NOTE — ANESTHESIA POSTPROCEDURE EVALUATION
Attended  delivery at the request of Dr. Vaughn for prematurity at 31 2/7 weeks gestation for maternal labor, of 26 yo G4, now P4 mother with rupture of membranes at 1032 with bloody fluid (appeared to be old blood), mother stated the she ruptured this am at 0900. Mother previously admitted for vaginal bleeding on -, received BMZ x 2 on  and . Maternal history of HTN, pre-eclampsia with 2 prior pregnancies. Maternal labs: blood type A+, GBS unknown, Rubella reactive, Hep B negative, HIV negative, RPR NR on , pending for this admission.  Delivered 3# 7.7 oz (1580 gms) male child at 1036 on 22 with good cry and appropriate tone. Loose nuchal cord x 2, reduced at delivery. Bulb suction and stimulation during resuscitation. Active vigorous infant. Apgar 8/9. Showed to mother, and transferred to NICU for further care.       Rapid Covid screen at 24 hours of age negative    screen: pending    Lactation, nutrition, and  consulted on admission.     Plan:  Will provide age appropriate care and screenings.   Follow results of  [place] screen   Procedure(s):  COLONOSCOPY WITH HOT SNARE COLONOSCOPY. MAC    Anesthesia Post Evaluation      Multimodal analgesia: multimodal analgesia not used between 6 hours prior to anesthesia start to PACU discharge  Patient location during evaluation: bedside  Patient participation: complete - patient participated  Level of consciousness: awake and alert  Pain score: 0  Pain management: adequate  Airway patency: patent  Anesthetic complications: no  Cardiovascular status: acceptable  Respiratory status: acceptable  Hydration status: acceptable  Post anesthesia nausea and vomiting:  none  Final Post Anesthesia Temperature Assessment:  Normothermia (36.0-37.5 degrees C)      INITIAL Post-op Vital signs:   Vitals Value Taken Time   /70 07/14/22 0920   Temp 36.5 °C (97.7 °F) 07/14/22 0859   Pulse 77 07/14/22 0923   Resp 26 07/14/22 0923   SpO2 98 % 07/14/22 0923   Vitals shown include unvalidated device data.

## 2022-07-14 NOTE — OP NOTES
[facility]  OPERATIVE REPORT    Name:  Ana Rodriguez  MR#:  [id number]  :  1950  ACCOUNT #:  [de-identified]  DATE OF SERVICE:  2022    PREOPERATIVE DIAGNOSIS:  Personal history of colon cancer. POSTOPERATIVE DIAGNOSIS:  Personal history of colon cancer. PROCEDURE PERFORMED:  Colonoscopy with hot snare polypectomy. SURGEON:  Mitchell Lindquist MD    ASSISTANT:  None    ANESTHESIA:  MAC.    COMPLICATIONS:  None. SPECIMENS REMOVED:  Polyp at 65 cm. IMPLANTS:  None. DRAINS:  None. ESTIMATED BLOOD LOSS:  Minimal.    FINDINGS:  1.  Polyp at 65 cm. 2.  Diverticulosis. DISPOSITION:  Stable. PROCEDURE:  The patient was brought to the operative theater. Monitoring devices and nasal cannula O2 were placed per Anesthesia, and the patient was placed in left side down decubitus position. IV sedation was administered, and a time-out was performed. Digital rectal exam was performed which was essentially normal.  A well-lubricated Olympus colonoscope was introduced in the patient's rectum and advanced to the cecum. The cecum was identified by the appendiceal orifice and ileocecal valve. There was some gelatinous material throughout the colon that was somewhat difficult to suction out and limited our visibility slightly, but overall most of this was able to be irrigated and suctioned. Within these limitations, no significant abnormalities were noted in the cecum or the ascending colon. The patient did have scattered diverticulosis throughout the transverse and residual descending colon. At 65 cm, a small polypoid lesion was identified and removed in its entirety by hot snare technique. No other abnormalities were noted other than the diverticulosis. The scope was pulled back into the rectum and retroflexed. No lesions were seen and the scope was straightened out and withdrawn.     The patient tolerated the procedure well and was transferred to PACU in stable condition.       Della Bryson MD      [id number]  D:  07/14/2022 9:02  T:  07/14/2022 10:07  JOB #:  [id number]  CC:  Arabella Lopez MD

## 2022-07-14 NOTE — INTERVAL H&P NOTE
Update History & Physical    The Patient's History and Physical of July 13, 2022 was reviewed with the patient and I examined the patient. There was no change. The surgical site was confirmed by the patient and me. Plan:  The risk, benefits, expected outcome, and alternative to the recommended procedure have been discussed with the patient. Patient understands and wants to proceed with the procedure.     Electronically signed by Erika Driver MD on 7/14/2022 at 8:12 AM

## 2022-07-14 NOTE — BRIEF OP NOTE
Brief Postoperative Note    Patient: Nicol Rader  YOB: 1950  MRN: [id number]    Date of Procedure: 7/14/2022     Pre-Op Diagnosis: PERSONAL HISTORY OF COLON CANCER, FAMILY HISTORY OF COLON CANCER IN FATHER    Post-Op Diagnosis: Same as preoperative diagnosis. Procedure(s):  COLONOSCOPY WITH HOT SNARE Polypectomy    Surgeon(s):  Adwoa Hendrickson MD    Surgical Assistant: None    Anesthesia: MAC     Estimated Blood Loss (mL): Minimal    Complications: None    Specimens:   ID Type Source Tests Collected by Time Destination   1 : Polyp at 65 cm Preservative Colon  Adwoa Hendrickson MD 7/14/2022 0845 Pathology        Implants: * No implants in log *    Drains: * No LDAs found *    Findings: 1. Polyp at 65 cm  2.  Diverticulosis     Electronically Signed by Analia Fairchild MD on 7/14/2022 at 8:58 AM

## 2022-07-14 NOTE — DISCHARGE INSTRUCTIONS
Patient Education        Colonoscopy: What to Expect at Home  Your Recovery  After a colonoscopy, you'll stay at the clinic until you wake up. Then you can go home. But you'll need to arrange for a ride. Your doctor will tell you when you can eat and do your other usual activities. Your doctor will talk to you about when you'll need your next colonoscopy. Your doctor can help you decide how often you need to be checked. This will depend on the results of your test and your risk for colorectal cancer. After the test, you may be bloated or have gas pains. You may need to pass gas. If a biopsy was done or a polyp was removed, you may have streaks of blood in your stool (feces) for a few days. Problems such as heavy rectal bleeding may not occur until several weeks after the test. This isn't common. But it can happen after polyps are removed. This care sheet gives you a general idea about how long it will take for you to recover. But each person recovers at a different pace. Follow the steps below to get better as quickly as possible. How can you care for yourself at home? Activity    · Rest when you feel tired.     · You can do your normal activities when it feels okay to do so. Diet    · Follow your doctor's directions for eating.     · Unless your doctor has told you not to, drink plenty of fluids. This helps to replace the fluids that were lost during the colon prep.     · Do not drink alcohol. Medicines    · Your doctor will tell you if and when you can restart your medicines. You will also be given instructions about taking any new medicines.     · If you take aspirin or some other blood thinner, ask your doctor if and when to start taking it again. Make sure that you understand exactly what your doctor wants you to do.     · If polyps were removed or a biopsy was done during the test, your doctor may tell you not to take aspirin or other anti-inflammatory medicines for a few days.  These include ibuprofen (Advil, Motrin) and naproxen (Aleve). Other instructions    · For your safety, do not drive or operate machinery until the medicine wears off and you can think clearly. Your doctor may tell you not to drive or operate machinery until the day after your test.     · Do not sign legal documents or make major decisions until the medicine wears off and you can think clearly. The anesthesia can make it hard for you to fully understand what you are agreeing to. Follow-up care is a key part of your treatment and safety. Be sure to make and go to all appointments, and call your doctor if you are having problems. It's also a good idea to know your test results and keep a list of the medicines you take. When should you call for help? Call 911 anytime you think you may need emergency care. For example, call if:    · You passed out (lost consciousness).     · You pass maroon or bloody stools.     · You have trouble breathing. Call your doctor now or seek immediate medical care if:    · You have pain that does not get better after you take pain medicine.     · You are sick to your stomach or cannot drink fluids.     · You have new or worse belly pain.     · You have blood in your stools.     · You have a fever.     · You cannot pass stools or gas. Watch closely for changes in your health, and be sure to contact your doctor if you have any problems. Where can you learn more? Go to http://www.gray.com/  Enter E264 in the search box to learn more about \"Colonoscopy: What to Expect at Home. \"  Current as of: September 8, 2021               Content Version: 13.2  © 3093-2967 [employer]. Care instructions adapted under license by [employer] (which disclaims liability or warranty for this information).  If you have questions about a medical condition or this instruction, always ask your healthcare professional. Bruce Chisholm disclaims any warranty or liability for your use of this information.

## 2022-07-25 ENCOUNTER — OFFICE VISIT (OUTPATIENT)
Dept: SURGERY | Age: 72
End: 2022-07-25
Payer: MEDICARE

## 2022-07-25 VITALS
DIASTOLIC BLOOD PRESSURE: 68 MMHG | WEIGHT: 170 LBS | BODY MASS INDEX: 33.38 KG/M2 | HEART RATE: 74 BPM | SYSTOLIC BLOOD PRESSURE: 145 MMHG | HEIGHT: 60 IN

## 2022-07-25 DIAGNOSIS — D12.6 HIGH GRADE DYSPLASIA IN COLONIC ADENOMA: Primary | ICD-10-CM

## 2022-07-25 PROCEDURE — 1090F PRES/ABSN URINE INCON ASSESS: CPT | Performed by: SURGERY

## 2022-07-25 PROCEDURE — G8417 CALC BMI ABV UP PARAM F/U: HCPCS | Performed by: SURGERY

## 2022-07-25 PROCEDURE — G8510 SCR DEP NEG, NO PLAN REQD: HCPCS | Performed by: SURGERY

## 2022-07-25 PROCEDURE — G8427 DOCREV CUR MEDS BY ELIG CLIN: HCPCS | Performed by: SURGERY

## 2022-07-25 PROCEDURE — G8536 NO DOC ELDER MAL SCRN: HCPCS | Performed by: SURGERY

## 2022-07-25 PROCEDURE — G9711 PT HX TOT COL OR COLON CA: HCPCS | Performed by: SURGERY

## 2022-07-25 PROCEDURE — 99213 OFFICE O/P EST LOW 20 MIN: CPT | Performed by: SURGERY

## 2022-07-25 PROCEDURE — 1101F PT FALLS ASSESS-DOCD LE1/YR: CPT | Performed by: SURGERY

## 2022-07-25 PROCEDURE — G8753 SYS BP > OR = 140: HCPCS | Performed by: SURGERY

## 2022-07-25 PROCEDURE — G8754 DIAS BP LESS 90: HCPCS | Performed by: SURGERY

## 2022-07-25 PROCEDURE — 1123F ACP DISCUSS/DSCN MKR DOCD: CPT | Performed by: SURGERY

## 2022-07-25 NOTE — LETTER
7/25/2022    Patient: Luís Santiago   YOB: 1950   Date of Visit: 7/25/2022     Ana Hinton MD  [facility] [id number]  Via Fax: [phone]    Dear Ana Hinton MD,      Thank you for referring Ms. Vicenta Ma to [address] for evaluation. My notes for this consultation are attached. If you have questions, please do not hesitate to call me. I look forward to following your patient along with you.       Sincerely,    Annalisa Jimenez MD

## 2022-07-25 NOTE — PROGRESS NOTES
[id number] [facility]      Clinic Note - Follow up    Subjective     Vin Jesus returns for scheduled follow up today. She is status post colonoscopy that was performed back on July 14. She has been doing well since procedure. She had a small polypoid lesion at 65 cm that was removed by hot snare technique. Surprisingly this has come back showing a tubular adenoma with high-grade dysplasia. The polyp was removed in its entirety at the time of the procedure. She also had scattered diverticulosis. Objective     Visit Vitals  BP (!) 145/68   Pulse 74   Ht 5' (1.524 m)   Wt 170 lb (77.1 kg)   BMI 33.20 kg/m²         PE  GEN - Awake, alert, communicating appropriately. NAD    Assessment     Vin Jesus is a 66 y. o.yr old female status post colonoscopy with findings of a small tubular adenoma with high-grade dysplasia which was removed in its entirety at the time of the procedure. She also had diverticulosis. Plan     Based on her personal history of colon cancer in her family history of colon cancer as well as a polyp that showed high-grade dysplasia I would recommend a repeat colonoscopy in 1 year. We have also discussed fiber supplementation.     Fausto Lujan MD    CC: Dr. Yony Bhatti

## 2022-07-25 NOTE — PATIENT INSTRUCTIONS
Colon Polyps: Care Instructions  Your Care Instructions     Colon polyps are growths in the colon or the rectum. The cause of most colon polyps is not known, and most people who get them do not have any problems. But a certain kind can turn into cancer. For this reason, regular testing for colon polyps is important for people as they get older. It is also important for anyone who has an increased risk for colon cancer. Polyps are usually found through routine colon cancer screening tests. Although most colon polyps are not cancerous, they are usually removed and then tested for cancer. Screening for colon cancer saves lives because the cancer can usually be cured if it is caught early. If you have a polyp that is the type that can turn into cancer, you may need more tests to examine your entire colon. The doctor will remove any other polyps that he or she finds, and you will be tested more often. Follow-up care is a key part of your treatment and safety. Be sure to make and go to all appointments, and call your doctor if you are having problems. It's also a good idea to know your test results and keep a list of the medicines you take. How can you care for yourself at home? Regular exams to look for colon polyps are the best way to prevent polyps from turning into colon cancer. These can include stool tests, sigmoidoscopy, colonoscopy, and CT colonography. Talk with your doctor about a testing schedule that is right for you. To prevent polyps  There is no home treatment that can prevent colon polyps. But these steps may help lower your risk for cancer. Stay active. Being active can help you get to and stay at a healthy weight. Try to exercise on most days of the week. Walking is a good choice. Eat well. Choose a variety of vegetables, fruits, legumes (such as peas and beans), fish, poultry, and whole grains. Do not smoke.  If you need help quitting, talk to your doctor about stop-smoking programs and medicines. These can increase your chances of quitting for good. If you drink alcohol, limit how much you drink. Limit alcohol to 2 drinks a day for men and 1 drink a day for women. When should you call for help? Call your doctor now or seek immediate medical care if:    You have severe belly pain. Your stools are maroon or very bloody. Watch closely for changes in your health, and be sure to contact your doctor if:    You have a fever. You have nausea or vomiting. You have a change in bowel habits (new constipation or diarrhea). Your symptoms get worse or are not improving as expected. Where can you learn more? Go to http://www.vieyra.com/  Enter C571 in the search box to learn more about \"Colon Polyps: Care Instructions. \"  Current as of: September 8, 2021               Content Version: 13.2  © 0296-5712 [employer]. Care instructions adapted under license by [employer] (which disclaims liability or warranty for this information). If you have questions about a medical condition or this instruction, always ask your healthcare professional. [address] any warranty or liability for your use of this information.

## 2022-07-25 NOTE — PROGRESS NOTES
Blood pressure was taken several times and each time BP remained elevated. Patient told me that she took her BP medications today. I asked patient to check her BP for three days and if BP remains elevated to make an appointment with her PCP.

## 2022-09-07 LAB
CREATININE, EXTERNAL: 0.88
HBA1C MFR BLD HPLC: 6.7 %
LDL CHOLESTEROL, EXTERNAL: 148

## 2022-10-24 NOTE — PROGRESS NOTES
Camila Ordoñez is a 66 y.o. female is here for routine f/u. Pmhx  hypertension, dyslipidemia, colon ca (s/p hemicolectomy), breast ca--s/p bilat mastectomies, XRT/Chemo;     Last seen by Dr Marshal Kerr in 8/23/2021:   Hx hypertension, dyslipidemia, colon ca (s/p hemicolectomy), breast ca--s/p bilat mastectomies, XRT/Chemo; No known cardiac hx. Episode of syncope after spell of abdominal pain, N/V, diarrhea, cramping and got up from commode--passed out. Initial ED w/u neg--told likely vasovagal. One episode 30-40 yrs ago. Seen by Dr. Olesya Bates testing: ECHO 10/6/16--normal LV size/walls, LVEF 65-70, mild MR, RSVP 42. Carotid dopplers 31/9/82--9-03% DIONY, 20-34% LICA, bilat antegrade vertebrals. Holter monitor 10/16 with NSR, PAC's, one 3 beat PAT run. Had some issues with LE edema, improved after reduced Na. Over 2018 summer had confusion/weakness--prob TIA, at [facility] with neg CV w/u. Echo 7/18 with LVEF 45-75, grade I diastolic, valves ok. No CV sx or complaints currently other than occ palpitations, vague discomfort related to stress/anxiety--non-exertional..  Continues to see PCP and Oncology with recent OV. Last ECHO 8/26/20 with LVEF 56%, grade I diastolic, valves ok. Labs 6/9/21 with CBC normal, CMP normal except BUN 23 (Cr 0.8), chol 198, , HDL 56, , HbA1c 6.33. Seen by me in 3/2022:  episode of chest discomfort about 2 weeks ago, lasted a few minutes, relieved with Rolaid. No sob. She had a negative NST in 5/2022    She is s/p colonoscopy in 7/2022 performed by Dr Katerine Pugh -- with findings of a small tubular adenoma with high-grade dysplasia which was removed in its entirety at the time of the procedure. She also had diverticulosis. Today  No specific CV complaints. States compliance with medication. Continues to see PCP,  recent labwork reviewed.     The patient denies chest pain/ shortness of breath, orthopnea, PND, LE edema, palpitations, syncope, presyncope or fatigue.         Patient Active Problem List    Diagnosis Date Noted    Postmenopausal osteoporosis 11/15/2019    Osteopenia due to cancer therapy 06/15/2017    Vasovagal syncope 10/11/2016    Left kidney mass 06/08/2016    Rash of neck 01/12/2016    Use of exemestane (Aromasin) 09/15/2015    S/P left hemicolectomy 11/11/2010    S/P bilateral mastectomy 10/18/2010    Essential hypertension, benign 10/12/2010    Dyslipidemia (high LDL; low HDL) 10/12/2010    S/P left hemicolectomy 10/12/2010    Family history of coronary artery disease 10/12/2010    Malignant neoplasm of female breast ([facility] [address] 09/02/2010      Suze Wen MD  Past Medical History:   Diagnosis Date    Arthritis     Breast CA ([facility] [address]     left    Cancer ([facility] [address] 1998    colon cancer    Diabetes ([facility] [address]     DM ll    Dyslipidemia (high LDL; low HDL) 10/12/2010    Essential hypertension, benign 10/12/2010    Family history of coronary artery disease 10/12/2010    FH: bowel obstruction 9/1/2015    Hypercholesteremia     Hypertension     Ill-defined condition     elevated cholesterol    Postmenopausal     S/P chemotherapy, time since greater than 12 weeks     S/P left hemicolectomy 10/12/2010      Past Surgical History:   Procedure Laterality Date    COLONOSCOPY N/A 7/14/2022    COLONOSCOPY WITH HOT SNARE COLONOSCOPY performed by Alfredo Lindquist MD at [facility] MAIN OR    HX APPENDECTOMY      HX COLONOSCOPY      [address]    HX GYN  2000    uterine ablation    HX OOPHORECTOMY  1977    part of left ovary removed    HX OTHER SURGICAL  2011    port a cath    HX SALPINGO-OOPHORECTOMY      HX TOTAL COLECTOMY  1998    IA BREAST SURGERY PROCEDURE UNLISTED  2010    double mastectomy     No Known Allergies   Family History   Problem Relation Age of Onset    Cancer Father 79        colon    Hypertension Mother     Arthritis-rheumatoid Mother     Elevated Lipids Mother     No Known Problems Sister     Elevated Lipids Brother     Hypertension Brother Hypertension Sister     Elevated Lipids Sister     No Known Problems Brother     Heart Disease Brother       Social History     Socioeconomic History    Marital status:      Spouse name: Not on file    Number of children: Not on file    Years of education: Not on file    Highest education level: Not on file   Occupational History    Not on file   Tobacco Use    Smoking status: Former     Packs/day: 0.50     Years: 10.00     Pack years: 5.00     Types: Cigarettes     Quit date: 1980     Years since quittin.8    Smokeless tobacco: Never    Tobacco comments:     quit 29 years ago   Substance and Sexual Activity    Alcohol use: No    Drug use: No    Sexual activity: Not on file     Comment: menses age 13, menopause 44 age, first birth 32, [place]   Other Topics Concern    Not on file   Social History Narrative    Not on file     Social Determinants of Health     Financial Resource Strain: Not on file   Food Insecurity: Not on file   Transportation Needs: Not on file   Physical Activity: Not on file   Stress: Not on file   Social Connections: Not on file   Intimate Partner Violence: Not on file   Housing Stability: Not on file      Current Outpatient Medications   Medication Sig    potassium citrate (UROCIT-K10) 10 mEq (1,080 mg) TbER Take  by mouth. (Patient not taking: No sig reported)    OTHER Mastectomy Bra and prosthesis x 3   Dx: Breast Cancer    aspirin delayed-release 81 mg tablet Take 1 Tab by mouth daily. metFORMIN ER (GLUCOPHAGE XR) 500 mg tablet Take 1 Tab by mouth daily. CALCIUM CARBONATE/VITAMIN D3 (CALTRATE 600 + D PO) Take 2 Caplet by mouth daily. denosumab (PROLIA) 60 mg/mL injection 60 mg by SubCUTAneous route. Every 6 months   Indications: POST-MENOPAUSAL OSTEOPOROSIS    magnesium oxide (MAG-OX) 400 mg tablet Take 400 mg by mouth daily. atorvastatin (LIPITOR) 40 mg tablet Take 40 mg by mouth daily.  Takes 60 mg daily    amlodipine (NORVASC) 10 mg tablet Take 10 mg by mouth daily. Indications: HYPERTENSION    multivitamins-minerals-lutein (MEN'S CENTRUM SILVER WITH LUTEIN) tab tablet Take  by mouth daily. No current facility-administered medications for this visit. Review of Symptoms:    CONST  No weight change. No fever, chills, sweats    ENT No visual changes, URI sx, sore throat    CV  See HPI   RESP  No cough, or sputum, wheezing. Also see HPI   GI  No abdominal pain or change in bowel habits. No heartburn or dysphagia. No melena or rectal bleeding.   No dysuria, urgency, frequency, hematuria   MSKEL  No joint pain, swelling. No muscle pain. SKIN  No rash or lesions. NEURO  No headache, syncope, or seizure. No weakness, loss of sensation, or paresthesias. PSYCH  No low mood or depression  No anxiety. HE/LYMPH  No easy bruising, abnormal bleeding, or enlarged glands. Physical ExamPhysical Exam:    There were no vitals taken for this visit. Gen: NAD  HEENT:  PERRL, throat clear  Neck: no adenopathy, no thyromegaly, no JVD   Heart:  Regular,Nl S1S2,  no murmur, gallop or rub. Lungs:  clear  Abdomen:   Soft, non-tender, bowel sounds are active.    Extremities:  No edema  Pulse: symmetric  Neuro: A&O times 3, No focal neuro deficits    Cardiographics    ECG: NSR,     Labs:   Lab Results   Component Value Date/Time    Sodium 142 12/08/2021 10:05 AM    Sodium 142 06/04/2021 09:45 AM    Sodium 140 12/03/2020 09:52 AM    Sodium 140 05/08/2020 09:46 AM    Sodium 142 11/21/2019 09:38 AM    Potassium 3.4 (L) 12/08/2021 10:05 AM    Potassium 4.0 06/04/2021 09:45 AM    Potassium 3.6 12/03/2020 09:52 AM    Potassium 4.1 05/08/2020 09:46 AM    Potassium 4.2 11/21/2019 09:38 AM    Chloride 103 12/08/2021 10:05 AM    Chloride 105 06/04/2021 09:45 AM    Chloride 102 12/03/2020 09:52 AM    Chloride 104 05/08/2020 09:46 AM    Chloride 102 11/21/2019 09:38 AM    CO2 28 12/08/2021 10:05 AM    CO2 28 06/04/2021 09:45 AM    CO2 24 12/03/2020 09:52 AM    CO2 28 05/08/2020 09:46 AM    CO2 30 11/21/2019 09:38 AM    Anion gap 11 12/08/2021 10:05 AM    Anion gap 9 06/04/2021 09:45 AM    Anion gap 14 12/03/2020 09:52 AM    Anion gap 8 05/08/2020 09:46 AM    Anion gap 10 11/21/2019 09:38 AM    Glucose 113 (H) 12/08/2021 10:05 AM    Glucose 111 (H) 06/04/2021 09:45 AM    Glucose 126 (H) 12/03/2020 09:52 AM    Glucose 127 (H) 05/08/2020 09:46 AM    Glucose 128 (H) 11/21/2019 09:38 AM    BUN 14 12/08/2021 10:05 AM    BUN 13 06/04/2021 09:45 AM    BUN 13 12/03/2020 09:52 AM    BUN 13 05/08/2020 09:46 AM    BUN 13 11/21/2019 09:38 AM    Creatinine 0.76 12/08/2021 10:05 AM    Creatinine 0.79 06/04/2021 09:45 AM    Creatinine 0.84 12/03/2020 09:52 AM    Creatinine 0.79 05/08/2020 09:46 AM    Creatinine 0.83 11/21/2019 09:38 AM    BUN/Creatinine ratio 18 12/08/2021 10:05 AM    BUN/Creatinine ratio 16 06/04/2021 09:45 AM    BUN/Creatinine ratio 15 12/03/2020 09:52 AM    BUN/Creatinine ratio 16 05/08/2020 09:46 AM    BUN/Creatinine ratio 16 11/21/2019 09:38 AM    GFR est AA >60 12/08/2021 10:05 AM    GFR est AA >60 06/04/2021 09:45 AM    GFR est AA >60 12/03/2020 09:52 AM    GFR est AA >60 05/08/2020 09:46 AM    GFR est AA >60 11/21/2019 09:38 AM    GFR est non-AA >60 12/08/2021 10:05 AM    GFR est non-AA >60 06/04/2021 09:45 AM    GFR est non-AA >60 12/03/2020 09:52 AM    GFR est non-AA >60 05/08/2020 09:46 AM    GFR est non-AA >60 11/21/2019 09:38 AM    Calcium 9.3 12/08/2021 10:05 AM    Calcium 9.5 06/04/2021 09:45 AM    Calcium 9.4 12/03/2020 09:52 AM    Calcium 9.0 05/08/2020 09:46 AM    Calcium 9.4 11/21/2019 09:38 AM    Bilirubin, total 0.6 12/08/2021 10:05 AM    Bilirubin, total 0.5 05/22/2019 09:50 AM    Bilirubin, total 0.4 11/14/2018 09:30 AM    Bilirubin, total 0.5 10/19/2017 09:27 AM    Bilirubin, total 0.5 06/15/2017 11:15 AM    Alk. phosphatase 87 12/08/2021 10:05 AM    Alk. phosphatase 104 05/22/2019 09:50 AM    Alk. phosphatase 78 11/14/2018 09:30 AM    Alk.  phosphatase 80 10/19/2017 09:27 AM    Alk. phosphatase 77 06/15/2017 11:15 AM    Protein, total 8.6 (H) 12/08/2021 10:05 AM    Protein, total 7.5 05/22/2019 09:50 AM    Protein, total 7.8 11/14/2018 09:30 AM    Protein, total 8.5 (H) 10/19/2017 09:27 AM    Protein, total 8.1 06/15/2017 11:15 AM    Albumin 4.5 12/08/2021 10:05 AM    Albumin 4.2 06/04/2021 09:45 AM    Albumin 4.5 12/03/2020 09:52 AM    Albumin 4.4 05/08/2020 09:46 AM    Albumin 4.3 11/21/2019 09:38 AM    Globulin 4.1 (H) 12/08/2021 10:05 AM    Globulin 3.4 05/22/2019 09:50 AM    Globulin 3.5 11/14/2018 09:30 AM    Globulin 3.9 10/19/2017 09:27 AM    Globulin 3.8 06/15/2017 11:15 AM    A-G Ratio 1.1 12/08/2021 10:05 AM    A-G Ratio 1.2 05/22/2019 09:50 AM    A-G Ratio 1.2 11/14/2018 09:30 AM    A-G Ratio 1.2 10/19/2017 09:27 AM    A-G Ratio 1.1 06/15/2017 11:15 AM    ALT (SGPT) 31 12/08/2021 10:05 AM    ALT (SGPT) 34 05/22/2019 09:50 AM    ALT (SGPT) 26 11/14/2018 09:30 AM    ALT (SGPT) 35 10/19/2017 09:27 AM    ALT (SGPT) 30 06/15/2017 11:15 AM     No results found for: CPK, CPKX, CPX  No results found for: CHOL, CHOLX, CHLST, CHOLV, 236745, HDL, HDLP, LDL, LDLC, DLDLP, TGLX, TRIGL, TRIGP, CHHD, CHHDX  No results found for this or any previous visit.     Assessment:         Patient Active Problem List    Diagnosis Date Noted    Postmenopausal osteoporosis 11/15/2019    Osteopenia due to cancer therapy 06/15/2017    Vasovagal syncope 10/11/2016    Left kidney mass 06/08/2016    Rash of neck 01/12/2016    Use of exemestane (Aromasin) 09/15/2015    S/P left hemicolectomy 11/11/2010    S/P bilateral mastectomy 10/18/2010    Essential hypertension, benign 10/12/2010    Dyslipidemia (high LDL; low HDL) 10/12/2010    S/P left hemicolectomy 10/12/2010    Family history of coronary artery disease 10/12/2010    Malignant neoplasm of female breast ([facility] [address] 09/02/2010     Hx hypertension, dyslipidemia, colon ca (s/p hemicolectomy), breast ca--s/p bilat mastectomies, XRT/Chemo; No known cardiac hx. Episode of syncope after spell of abdominal pain, N/V, diarrhea, cramping and got up from commode--passed out. Initial ED w/u neg--told likely vasovagal. One episode 30-40 yrs ago. Seen by Dr. Eden Collier testing: ECHO 10/6/16--normal LV size/walls, LVEF 65-70, mild MR, RSVP 42. Carotid dopplers 66/6/82--5-42% DIONY, 76-49% LICA, bilat antegrade vertebrals. Holter monitor 10/16 with NSR, PAC's, one 3 beat PAT run. Had some issues with LE edema, improved after reduced Na. Over 2018 summer had confusion/weakness--prob TIA, at [facility] with neg CV w/u. Echo 7/18 with LVEF 63-56, grade I diastolic, valves ok. No CV sx or complaints currently other than occ palpitations, vague discomfort related to stress/anxiety--non-exertional..  Continues to see PCP and Oncology with recent OV. Last ECHO 8/26/20 with LVEF 15%, grade I diastolic, valves ok. Labs 6/9/21 with CBC normal, CMP normal except BUN 23 (Cr 0.8), chol 198, , HDL 56, , HbA1c 6.33     Plan:     Hx vasovagal syncope, no recurrence  Last ECHO 8/26/20 with LVEF 93%, grade I diastolic, valves ok  ECHO 10/6/16--normal LV size/walls, LVEF 65-70, mild MR, RSVP 42.   Echo 7/18 with LVEF 71-09, grade I diastolic, valves ok. Carotid dopplers 74/8/29--8-28% DIONY, 81-39% LICA, bilat antegrade vertebrals. Holter monitor 10/16 with NSR, PAC's, one 3 beat PAT run. Atypical cp  NST 5/2022:  No evidence of myocardial ischemia or infarction. Left ventricular ejection fraction is 82 %  Continue ASA, CCB and statin    HTN  Controlled with current therapy  Stable kidney fxn Serum Cr 0.88 in 9/2022    HLD  9/2022  On atorva 40 mg daily Labs and lipids per PCP  Continue to follow a low cholesterol diet. Try to limit the amount of fried foods, fatty foods, butter, gravy, red meat, ice cream, cheese, and eggs in your diet, which are all high in cholesterol.   Wants to  hold off on med adjustment at this time  1/2022    9/2021     6/2021       DM  On oral agent    Carotid artery disease  Carotid dopplers 6/2019---- minimal stenosis in the right ICA; mild stenosis in the left ICA (<50%)  Carotid dopplers 01/8/83--9-30% DIONY, 09-83% LICA, bilat antegrade vertebrals. On ASA, statin  Will repeat at follow up    Breast ca--s/p bilat mastectomies, XRT/Chemo;      S/p colonoscopy 7/2022  with findings of a small tubular adenoma with high-grade dysplasia which was removed in its entirety at the time of the procedure.   She also had diverticulosis  For repeat in 1 yr      Continue current care and f/u in 4 mos    Jose Ramon Louis NP

## 2022-10-26 ENCOUNTER — OFFICE VISIT (OUTPATIENT)
Dept: CARDIOLOGY CLINIC | Age: 72
End: 2022-10-26
Payer: MEDICARE

## 2022-10-26 VITALS
HEIGHT: 60 IN | TEMPERATURE: 98.3 F | OXYGEN SATURATION: 98 % | DIASTOLIC BLOOD PRESSURE: 80 MMHG | RESPIRATION RATE: 18 BRPM | BODY MASS INDEX: 32.39 KG/M2 | HEART RATE: 75 BPM | WEIGHT: 165 LBS | SYSTOLIC BLOOD PRESSURE: 140 MMHG

## 2022-10-26 DIAGNOSIS — I10 ESSENTIAL HYPERTENSION, BENIGN: Primary | ICD-10-CM

## 2022-10-26 DIAGNOSIS — R07.9 CHEST PAIN, UNSPECIFIED TYPE: ICD-10-CM

## 2022-10-26 DIAGNOSIS — I65.23 BILATERAL CAROTID ARTERY STENOSIS: ICD-10-CM

## 2022-10-26 DIAGNOSIS — E11.8 CONTROLLED TYPE 2 DIABETES MELLITUS WITH COMPLICATION, WITHOUT LONG-TERM CURRENT USE OF INSULIN (HCC): ICD-10-CM

## 2022-10-26 DIAGNOSIS — Z86.73 HISTORY OF TRANSIENT ISCHEMIC ATTACK (TIA): ICD-10-CM

## 2022-10-26 DIAGNOSIS — E78.5 DYSLIPIDEMIA (HIGH LDL; LOW HDL): ICD-10-CM

## 2022-10-26 PROCEDURE — 2022F DILAT RTA XM EVC RTNOPTHY: CPT | Performed by: NURSE PRACTITIONER

## 2022-10-26 PROCEDURE — G8536 NO DOC ELDER MAL SCRN: HCPCS | Performed by: NURSE PRACTITIONER

## 2022-10-26 PROCEDURE — 1101F PT FALLS ASSESS-DOCD LE1/YR: CPT | Performed by: NURSE PRACTITIONER

## 2022-10-26 PROCEDURE — 3074F SYST BP LT 130 MM HG: CPT | Performed by: NURSE PRACTITIONER

## 2022-10-26 PROCEDURE — 99214 OFFICE O/P EST MOD 30 MIN: CPT | Performed by: NURSE PRACTITIONER

## 2022-10-26 PROCEDURE — G8753 SYS BP > OR = 140: HCPCS | Performed by: NURSE PRACTITIONER

## 2022-10-26 PROCEDURE — G8417 CALC BMI ABV UP PARAM F/U: HCPCS | Performed by: NURSE PRACTITIONER

## 2022-10-26 PROCEDURE — 1090F PRES/ABSN URINE INCON ASSESS: CPT | Performed by: NURSE PRACTITIONER

## 2022-10-26 PROCEDURE — G9711 PT HX TOT COL OR COLON CA: HCPCS | Performed by: NURSE PRACTITIONER

## 2022-10-26 PROCEDURE — G8427 DOCREV CUR MEDS BY ELIG CLIN: HCPCS | Performed by: NURSE PRACTITIONER

## 2022-10-26 PROCEDURE — 1123F ACP DISCUSS/DSCN MKR DOCD: CPT | Performed by: NURSE PRACTITIONER

## 2022-10-26 PROCEDURE — 3044F HG A1C LEVEL LT 7.0%: CPT | Performed by: NURSE PRACTITIONER

## 2022-10-26 PROCEDURE — 3078F DIAST BP <80 MM HG: CPT | Performed by: NURSE PRACTITIONER

## 2022-10-26 PROCEDURE — G8754 DIAS BP LESS 90: HCPCS | Performed by: NURSE PRACTITIONER

## 2022-10-26 PROCEDURE — G8432 DEP SCR NOT DOC, RNG: HCPCS | Performed by: NURSE PRACTITIONER

## 2022-10-26 RX ORDER — MELOXICAM 15 MG/1
15 TABLET ORAL DAILY
COMMUNITY
Start: 2022-10-21

## 2022-10-26 NOTE — PROGRESS NOTES
Identified pt with two pt identifiers(name and ). Reviewed record in preparation for visit and have obtained necessary documentation. Chief Complaint   Patient presents with    Hypertension     6 month follow up      Vitals:    10/26/22 1103   BP: (!) 140/80   Pulse: 75   Resp: 18   Temp: 98.3 °F (36.8 °C)   TempSrc: Temporal   SpO2: 98%   Weight: 165 lb (74.8 kg)   Height: 5' (1.524 m)   PainSc:   0 - No pain       Medications reviewed/approved by provider. Health Maintenance Review: Patient reminded of \"due or due soon\" health maintenance. I have asked the patient to contact his/her primary care provider (PCP) for follow-up on his/her health maintenance. Coordination of Care Questionnaire:  :   1) Have you been to an emergency room, urgent care, or hospitalized since your last visit? If yes, where when, and reason for visit? Yes, colonoscopy at [facility]. 2. Have seen or consulted any other health care provider since your last visit? If yes, where when, and reason for visit? Yes Dr. Lucio Gomez for r/c. Patient is accompanied by self I have received verbal consent from Tarun Crandall to discuss any/all medical information while they are present in the room.

## 2022-12-07 ENCOUNTER — HOSPITAL ENCOUNTER (OUTPATIENT)
Dept: INFUSION THERAPY | Age: 72
Discharge: HOME | End: 2022-12-07
Payer: MEDICARE

## 2022-12-07 VITALS
TEMPERATURE: 98.2 F | SYSTOLIC BLOOD PRESSURE: 140 MMHG | HEART RATE: 68 BPM | BODY MASS INDEX: 33.28 KG/M2 | WEIGHT: 169.5 LBS | OXYGEN SATURATION: 100 % | DIASTOLIC BLOOD PRESSURE: 63 MMHG | RESPIRATION RATE: 18 BRPM | HEIGHT: 60 IN

## 2022-12-07 LAB
ALBUMIN SERPL-MCNC: 4.3 G/DL (ref 3.5–5)
ANION GAP SERPL CALC-SCNC: 9 MMOL/L (ref 5–15)
BUN SERPL-MCNC: 17 MG/DL (ref 6–20)
BUN/CREAT SERPL: 21 (ref 12–20)
CALCIUM SERPL-MCNC: 9.1 MG/DL (ref 8.5–10.1)
CHLORIDE SERPL-SCNC: 103 MMOL/L (ref 97–108)
CO2 SERPL-SCNC: 28 MMOL/L (ref 21–32)
CREAT SERPL-MCNC: 0.82 MG/DL (ref 0.55–1.02)
GLUCOSE SERPL-MCNC: 108 MG/DL (ref 65–100)
MAGNESIUM SERPL-MCNC: 2.2 MG/DL (ref 1.6–2.4)
PHOSPHATE SERPL-MCNC: 4 MG/DL (ref 2.6–4.7)
POTASSIUM SERPL-SCNC: 3.4 MMOL/L (ref 3.5–5.1)
SODIUM SERPL-SCNC: 140 MMOL/L (ref 136–145)

## 2022-12-07 PROCEDURE — 80069 RENAL FUNCTION PANEL: CPT

## 2022-12-07 PROCEDURE — 96372 THER/PROPH/DIAG INJ SC/IM: CPT

## 2022-12-07 PROCEDURE — 36415 COLL VENOUS BLD VENIPUNCTURE: CPT

## 2022-12-07 PROCEDURE — 74011250636 HC RX REV CODE- 250/636: Performed by: INTERNAL MEDICINE

## 2022-12-07 PROCEDURE — 83735 ASSAY OF MAGNESIUM: CPT

## 2022-12-07 RX ADMIN — DENOSUMAB 60 MG: 60 INJECTION SUBCUTANEOUS at 11:28

## 2022-12-07 NOTE — PROGRESS NOTES
[facility] Progress Note    Date: 2022    Name: Tamar Herron    MRN: [id number]         : 1950      Ms. Yajaira Berry was assessed and education was provided. Ms. Rhonda Long vitals were reviewed and patient was observed for 5 minutes prior to treatment. Visit Vitals  BP (!) 140/63 (BP 1 Location: Left upper arm, BP Patient Position: Sitting)   Pulse 68   Temp 98.2 °F (36.8 °C)   Resp 18   Ht 5' (1.524 m)   Wt 76.9 kg (169 lb 8 oz)   SpO2 100%   Breastfeeding No   BMI 33.10 kg/m²       Lab results were obtained and reviewed. Recent Results (from the past 12 hour(s))   RENAL FUNCTION PANEL    Collection Time: 22 10:53 AM   Result Value Ref Range    Sodium 140 136 - 145 mmol/L    Potassium 3.4 (L) 3.5 - 5.1 mmol/L    Chloride 103 97 - 108 mmol/L    CO2 28 21 - 32 mmol/L    Anion gap 9 5 - 15 mmol/L    Glucose 108 (H) 65 - 100 mg/dL    BUN 17 6 - 20 MG/DL    Creatinine 0.82 0.55 - 1.02 MG/DL    BUN/Creatinine ratio 21 (H) 12 - 20      eGFR >60 >60 ml/min/1.73m2    Calcium 9.1 8.5 - 10.1 MG/DL    Phosphorus 4.0 2.6 - 4.7 MG/DL    Albumin 4.3 3.5 - 5.0 g/dL   MAGNESIUM    Collection Time: 22 10:53 AM   Result Value Ref Range    Magnesium 2.2 1.6 - 2.4 mg/dL       Prolia 60mg was administered subcutaneous in  right arm per patients request.       Ms. Yajaira Berry tolerated well, and had no complaints. Patient armband removed and shredded. Ms. Yajaira Berry was discharged from [place] [address] in stable condition at 1129. She will need a new order from her PCP for future appointments.     Jeramie Costa RN  2022  1:16 PM

## 2022-12-07 NOTE — PROGRESS NOTES
Problem: Knowledge Deficit  Goal: *Verbalizes understanding of procedures and medications  Outcome: Resolved/Met     Problem: Patient Education:  Go to Education Activity  Goal: Patient/Family Education  Outcome: Resolved/Met

## 2022-12-15 LAB — HBA1C MFR BLD HPLC: 6.5 %

## 2023-01-10 NOTE — PROGRESS NOTES
Problem: Patient Education:  Go to Education Activity  Goal: Patient/Family Education  Outcome: Resolved/Met show

## 2023-02-23 NOTE — PROGRESS NOTES
Grant Whittington is a 74 y.o. female is here for routine f/u. Pmhx  hypertension, dyslipidemia, colon ca (s/p hemicolectomy), breast ca--s/p bilat mastectomies, XRT/Chemo;     Last seen by Dr Paul Vieira in 8/23/2021:   Hx hypertension, dyslipidemia, colon ca (s/p hemicolectomy), breast ca--s/p bilat mastectomies, XRT/Chemo; No known cardiac hx. Episode of syncope after spell of abdominal pain, N/V, diarrhea, cramping and got up from commode--passed out. Initial ED w/u neg--told likely vasovagal. One episode 30-40 yrs ago. Seen by Dr. Espinoza Null testing: ECHO 10/6/16--normal LV size/walls, LVEF 65-70, mild MR, RSVP 42. Carotid dopplers 01/1/48--4-53% DIONY, 23-48% LICA, bilat antegrade vertebrals. Holter monitor 10/16 with NSR, PAC's, one 3 beat PAT run. Had some issues with LE edema, improved after reduced Na. Over 2018 summer had confusion/weakness--prob TIA, at [address] with neg CV w/u. Echo 7/18 with LVEF 16-99, grade I diastolic, valves ok. No CV sx or complaints currently other than occ palpitations, vague discomfort related to stress/anxiety--non-exertional..  Continues to see PCP and Oncology with recent OV. Last ECHO 8/26/20 with LVEF 55%, grade I diastolic, valves ok. Labs 6/9/21 with CBC normal, CMP normal except BUN 23 (Cr 0.8), chol 198, , HDL 56, , HbA1c 6.33. Seen by me in 3/2022:  episode of chest discomfort about 2 weeks ago, lasted a few minutes, relieved with Rolaid. No sob. She had a negative NST in 5/2022    She is s/p colonoscopy in 7/2022 performed by Dr Jimena Son -- with findings of a small tubular adenoma with high-grade dysplasia which was removed in its entirety at the time of the procedure. She also had diverticulosis. Last seen by me in 10/2022    Today    No specific CV complaints. States compliance with medication. She recently joined a local gym to exercise at least 3-4 times a week; no exertional symptoms.       The patient denies chest pain/ shortness of breath, orthopnea, PND, LE edema, palpitations, syncope, presyncope or fatigue.         Patient Active Problem List    Diagnosis Date Noted    Postmenopausal osteoporosis 11/15/2019    Osteopenia due to cancer therapy 06/15/2017    Vasovagal syncope 10/11/2016    Left kidney mass 06/08/2016    Rash of neck 01/12/2016    Use of exemestane (Aromasin) 09/15/2015    S/P left hemicolectomy 11/11/2010    S/P bilateral mastectomy 10/18/2010    Essential hypertension, benign 10/12/2010    Dyslipidemia (high LDL; low HDL) 10/12/2010    S/P left hemicolectomy 10/12/2010    Family history of coronary artery disease 10/12/2010    Malignant neoplasm of female breast ([facility] [address] 09/02/2010      Suze Wen MD  Past Medical History:   Diagnosis Date    Arthritis     Breast CA ([facility] [address]     left    Cancer ([facility] [address] 1998    colon cancer    Diabetes ([facility] [address]     DM ll    Dyslipidemia (high LDL; low HDL) 10/12/2010    Essential hypertension, benign 10/12/2010    Family history of coronary artery disease 10/12/2010    FH: bowel obstruction 9/1/2015    Hypercholesteremia     Hypertension     Ill-defined condition     elevated cholesterol    Postmenopausal     S/P chemotherapy, time since greater than 12 weeks     S/P left hemicolectomy 10/12/2010      Past Surgical History:   Procedure Laterality Date    COLONOSCOPY N/A 7/14/2022    COLONOSCOPY WITH HOT SNARE COLONOSCOPY performed by Bertha Barry MD at [facility] MAIN OR    HX APPENDECTOMY      HX COLONOSCOPY      [address]    HX GYN  2000    uterine ablation    HX OOPHORECTOMY  1977    part of left ovary removed    HX OTHER SURGICAL  2011    port a cath    HX SALPINGO-OOPHORECTOMY      HX TOTAL COLECTOMY  1998    MD BREAST SURGERY PROCEDURE UNLISTED  2010    double mastectomy     No Known Allergies   Family History   Problem Relation Age of Onset    Cancer Father 79        colon    Hypertension Mother     Arthritis-rheumatoid Mother     Elevated Lipids Mother     No Known Problems Sister     Elevated Lipids Brother     Hypertension Brother     Hypertension Sister     Elevated Lipids Sister     No Known Problems Brother     Heart Disease Brother       Social History     Socioeconomic History    Marital status:      Spouse name: Not on file    Number of children: Not on file    Years of education: Not on file    Highest education level: Not on file   Occupational History    Not on file   Tobacco Use    Smoking status: Former     Packs/day: 0.50     Years: 10.00     Pack years: 5.00     Types: Cigarettes     Quit date: 1980     Years since quittin.1    Smokeless tobacco: Never    Tobacco comments:     quit 29 years ago   Substance and Sexual Activity    Alcohol use: No    Drug use: No    Sexual activity: Not on file     Comment: menses age 13, menopause 44 age, first birth 32,    Other Topics Concern    Not on file   Social History Narrative    Not on file     Social Determinants of Health     Financial Resource Strain: Not on file   Food Insecurity: Not on file   Transportation Needs: Not on file   Physical Activity: Not on file   Stress: Not on file   Social Connections: Not on file   Intimate Partner Violence: Not on file   Housing Stability: Not on file      Current Outpatient Medications   Medication Sig    meloxicam (MOBIC) 15 mg tablet Take 15 mg by mouth daily. potassium citrate (UROCIT-K10) 10 mEq (1,080 mg) TbER Take  by mouth. (Patient not taking: Reported on 2022)    OTHER Mastectomy Bra and prosthesis x 3   Dx: Breast Cancer    aspirin delayed-release 81 mg tablet Take 1 Tab by mouth daily. metFORMIN ER (GLUCOPHAGE XR) 500 mg tablet Take 1 Tab by mouth daily. CALCIUM CARBONATE/VITAMIN D3 (CALTRATE 600 + D PO) Take 2 Caplet by mouth daily. denosumab (PROLIA) 60 mg/mL injection 60 mg by SubCUTAneous route. Every 6 months   Indications: POST-MENOPAUSAL OSTEOPOROSIS    magnesium oxide (MAG-OX) 400 mg tablet Take 400 mg by mouth daily. atorvastatin (LIPITOR) 40 mg tablet Take 40 mg by mouth daily. Takes 40 mg daily    amlodipine (NORVASC) 10 mg tablet Take 10 mg by mouth daily. Indications: HYPERTENSION    multivitamins-minerals-lutein (MEN'S CENTRUM SILVER WITH LUTEIN) tab tablet Take  by mouth daily. No current facility-administered medications for this visit. Review of Symptoms:    CONST  No weight change. No fever, chills, sweats    ENT No visual changes, URI sx, sore throat    CV  See HPI   RESP  No cough, or sputum, wheezing. Also see HPI   GI  No abdominal pain or change in bowel habits. No heartburn or dysphagia. No melena or rectal bleeding.   No dysuria, urgency, frequency, hematuria   MSKEL  No joint pain, swelling. No muscle pain. SKIN  No rash or lesions. NEURO  No headache, syncope, or seizure. No weakness, loss of sensation, or paresthesias. PSYCH  No low mood or depression  No anxiety. HE/LYMPH  No easy bruising, abnormal bleeding, or enlarged glands. Physical ExamPhysical Exam:    There were no vitals taken for this visit. Gen: NAD  HEENT:  PERRL, throat clear  Neck: no adenopathy, no thyromegaly, no JVD   Heart:  Regular,Nl S1S2,  no murmur, gallop or rub. Lungs:  clear  Abdomen:   Soft, non-tender, bowel sounds are active.    Extremities:  No edema  Pulse: symmetric  Neuro: A&O times 3, No focal neuro deficits    Cardiographics    ECG: NSR,     Labs:   Lab Results   Component Value Date/Time    Sodium 140 12/07/2022 10:53 AM    Sodium 142 12/08/2021 10:05 AM    Sodium 142 06/04/2021 09:45 AM    Sodium 140 12/03/2020 09:52 AM    Sodium 140 05/08/2020 09:46 AM    Potassium 3.4 (L) 12/07/2022 10:53 AM    Potassium 3.4 (L) 12/08/2021 10:05 AM    Potassium 4.0 06/04/2021 09:45 AM    Potassium 3.6 12/03/2020 09:52 AM    Potassium 4.1 05/08/2020 09:46 AM    Chloride 103 12/07/2022 10:53 AM    Chloride 103 12/08/2021 10:05 AM    Chloride 105 06/04/2021 09:45 AM    Chloride 102 12/03/2020 09:52 AM    Chloride 104 05/08/2020 09:46 AM    CO2 28 12/07/2022 10:53 AM    CO2 28 12/08/2021 10:05 AM    CO2 28 06/04/2021 09:45 AM    CO2 24 12/03/2020 09:52 AM    CO2 28 05/08/2020 09:46 AM    Anion gap 9 12/07/2022 10:53 AM    Anion gap 11 12/08/2021 10:05 AM    Anion gap 9 06/04/2021 09:45 AM    Anion gap 14 12/03/2020 09:52 AM    Anion gap 8 05/08/2020 09:46 AM    Glucose 108 (H) 12/07/2022 10:53 AM    Glucose 113 (H) 12/08/2021 10:05 AM    Glucose 111 (H) 06/04/2021 09:45 AM    Glucose 126 (H) 12/03/2020 09:52 AM    Glucose 127 (H) 05/08/2020 09:46 AM    BUN 17 12/07/2022 10:53 AM    BUN 14 12/08/2021 10:05 AM    BUN 13 06/04/2021 09:45 AM    BUN 13 12/03/2020 09:52 AM    BUN 13 05/08/2020 09:46 AM    Creatinine 0.82 12/07/2022 10:53 AM    Creatinine 0.76 12/08/2021 10:05 AM    Creatinine 0.79 06/04/2021 09:45 AM    Creatinine 0.84 12/03/2020 09:52 AM    Creatinine 0.79 05/08/2020 09:46 AM    BUN/Creatinine ratio 21 (H) 12/07/2022 10:53 AM    BUN/Creatinine ratio 18 12/08/2021 10:05 AM    BUN/Creatinine ratio 16 06/04/2021 09:45 AM    BUN/Creatinine ratio 15 12/03/2020 09:52 AM    BUN/Creatinine ratio 16 05/08/2020 09:46 AM    GFR est AA >60 12/08/2021 10:05 AM    GFR est AA >60 06/04/2021 09:45 AM    GFR est AA >60 12/03/2020 09:52 AM    GFR est AA >60 05/08/2020 09:46 AM    GFR est AA >60 11/21/2019 09:38 AM    GFR est non-AA >60 12/08/2021 10:05 AM    GFR est non-AA >60 06/04/2021 09:45 AM    GFR est non-AA >60 12/03/2020 09:52 AM    GFR est non-AA >60 05/08/2020 09:46 AM    GFR est non-AA >60 11/21/2019 09:38 AM    Calcium 9.1 12/07/2022 10:53 AM    Calcium 9.3 12/08/2021 10:05 AM    Calcium 9.5 06/04/2021 09:45 AM    Calcium 9.4 12/03/2020 09:52 AM    Calcium 9.0 05/08/2020 09:46 AM    Bilirubin, total 0.6 12/08/2021 10:05 AM    Bilirubin, total 0.5 05/22/2019 09:50 AM    Bilirubin, total 0.4 11/14/2018 09:30 AM    Bilirubin, total 0.5 10/19/2017 09:27 AM    Bilirubin, total 0.5 06/15/2017 11:15 AM Alk. phosphatase 87 12/08/2021 10:05 AM    Alk. phosphatase 104 05/22/2019 09:50 AM    Alk. phosphatase 78 11/14/2018 09:30 AM    Alk. phosphatase 80 10/19/2017 09:27 AM    Alk. phosphatase 77 06/15/2017 11:15 AM    Protein, total 8.6 (H) 12/08/2021 10:05 AM    Protein, total 7.5 05/22/2019 09:50 AM    Protein, total 7.8 11/14/2018 09:30 AM    Protein, total 8.5 (H) 10/19/2017 09:27 AM    Protein, total 8.1 06/15/2017 11:15 AM    Albumin 4.3 12/07/2022 10:53 AM    Albumin 4.5 12/08/2021 10:05 AM    Albumin 4.2 06/04/2021 09:45 AM    Albumin 4.5 12/03/2020 09:52 AM    Albumin 4.4 05/08/2020 09:46 AM    Globulin 4.1 (H) 12/08/2021 10:05 AM    Globulin 3.4 05/22/2019 09:50 AM    Globulin 3.5 11/14/2018 09:30 AM    Globulin 3.9 10/19/2017 09:27 AM    Globulin 3.8 06/15/2017 11:15 AM    A-G Ratio 1.1 12/08/2021 10:05 AM    A-G Ratio 1.2 05/22/2019 09:50 AM    A-G Ratio 1.2 11/14/2018 09:30 AM    A-G Ratio 1.2 10/19/2017 09:27 AM    A-G Ratio 1.1 06/15/2017 11:15 AM    ALT (SGPT) 31 12/08/2021 10:05 AM    ALT (SGPT) 34 05/22/2019 09:50 AM    ALT (SGPT) 26 11/14/2018 09:30 AM    ALT (SGPT) 35 10/19/2017 09:27 AM    ALT (SGPT) 30 06/15/2017 11:15 AM     No results found for: CPK, CPKX, CPX  No results found for: CHOL, CHOLX, CHLST, CHOLV, 135234, HDL, HDLP, LDL, LDLC, DLDLP, TGLX, TRIGL, TRIGP, CHHD, CHHDX  No results found for this or any previous visit.     Assessment:         Patient Active Problem List    Diagnosis Date Noted    Postmenopausal osteoporosis 11/15/2019    Osteopenia due to cancer therapy 06/15/2017    Vasovagal syncope 10/11/2016    Left kidney mass 06/08/2016    Rash of neck 01/12/2016    Use of exemestane (Aromasin) 09/15/2015    S/P left hemicolectomy 11/11/2010    S/P bilateral mastectomy 10/18/2010    Essential hypertension, benign 10/12/2010    Dyslipidemia (high LDL; low HDL) 10/12/2010    S/P left hemicolectomy 10/12/2010    Family history of coronary artery disease 10/12/2010    Malignant neoplasm of female breast ([facility] [address] 09/02/2010     Hx hypertension, dyslipidemia, colon ca (s/p hemicolectomy), breast ca--s/p bilat mastectomies, XRT/Chemo; No known cardiac hx. Episode of syncope after spell of abdominal pain, N/V, diarrhea, cramping and got up from commode--passed out. Initial ED w/u neg--told likely vasovagal. One episode 30-40 yrs ago. Seen by Dr. Stas Ballard testing: ECHO 10/6/16--normal LV size/walls, LVEF 65-70, mild MR, RSVP 42. Carotid dopplers 93/9/23--4-08% DIONY, 71-88% LICA, bilat antegrade vertebrals. Holter monitor 10/16 with NSR, PAC's, one 3 beat PAT run. Had some issues with LE edema, improved after reduced Na. Over 2018 summer had confusion/weakness--prob TIA, at [facility] with neg CV w/u. Echo 7/18 with LVEF 46-50, grade I diastolic, valves ok. No CV sx or complaints currently other than occ palpitations, vague discomfort related to stress/anxiety--non-exertional..  Continues to see PCP and Oncology with recent OV. Last ECHO 8/26/20 with LVEF 64%, grade I diastolic, valves ok. Labs 6/9/21 with CBC normal, CMP normal except BUN 23 (Cr 0.8), chol 198, , HDL 56, , HbA1c 6.33     Plan:     Hx vasovagal syncope, no recurrence  Last ECHO 8/26/20 with LVEF 91%, grade I diastolic, valves ok  ECHO 10/6/16--normal LV size/walls, LVEF 65-70, mild MR, RSVP 42.   Echo 7/18 with LVEF 56-44, grade I diastolic, valves ok. Carotid dopplers 22/5/08--8-11% DIONY, 50-05% LICA, bilat antegrade vertebrals. Holter monitor 10/16 with NSR, PAC's, one 3 beat PAT run. Atypical cp  NST 5/2022:  No evidence of myocardial ischemia or infarction. Left ventricular ejection fraction is 82 %  Continue ASA, CCB and statin    HTN  Controlled with current therapy  The patient will monitor BP at home and call if generally >140/85. Stable kidney fxn Serum Cr 0.82 in 12/2022    HLD  12/2022  On atorva 40 mg daily Labs and lipids per PCP  Continue to follow a low cholesterol diet.   Try to limit the amount of fried foods, fatty foods, butter, gravy, red meat, ice cream, cheese, and eggs in your diet, which are all high in cholesterol. Wants to  hold off on med adjustment at this time  9/2022      DM  A1C 6.5 in 12/2022  On oral agent    Carotid artery disease  Carotid dopplers 6/2019---- minimal stenosis in the right ICA; mild stenosis in the left ICA (<50%)  Carotid dopplers 88/0/80--2-99% DIONY, 97-73% LICA, bilat antegrade vertebrals. On ASA, statin  Will repeat at follow up    Breast ca--s/p bilat mastectomies, XRT/Chemo;  Prev followed by Dr Ramesh Vizcaino    S/p colonoscopy 7/2022  with findings of a small tubular adenoma with high-grade dysplasia which was removed in its entirety at the time of the procedure.   She also had diverticulosis  For repeat in 1 yr      Continue current care and f/u in 6 mos with Dr Nikko Deluna, NP

## 2023-03-01 ENCOUNTER — OFFICE VISIT (OUTPATIENT)
Dept: CARDIOLOGY CLINIC | Age: 73
End: 2023-03-01
Payer: MEDICARE

## 2023-03-01 VITALS
SYSTOLIC BLOOD PRESSURE: 140 MMHG | BODY MASS INDEX: 33.38 KG/M2 | DIASTOLIC BLOOD PRESSURE: 88 MMHG | TEMPERATURE: 97.4 F | HEIGHT: 60 IN | HEART RATE: 67 BPM | RESPIRATION RATE: 18 BRPM | OXYGEN SATURATION: 100 % | WEIGHT: 170 LBS

## 2023-03-01 DIAGNOSIS — E11.8 CONTROLLED TYPE 2 DIABETES MELLITUS WITH COMPLICATION, WITHOUT LONG-TERM CURRENT USE OF INSULIN (HCC): ICD-10-CM

## 2023-03-01 DIAGNOSIS — I10 ESSENTIAL HYPERTENSION, BENIGN: Primary | ICD-10-CM

## 2023-03-01 DIAGNOSIS — I65.23 BILATERAL CAROTID ARTERY STENOSIS: ICD-10-CM

## 2023-03-01 DIAGNOSIS — Z86.73 HISTORY OF TRANSIENT ISCHEMIC ATTACK (TIA): ICD-10-CM

## 2023-03-01 DIAGNOSIS — Z90.13 S/P BILATERAL MASTECTOMY: ICD-10-CM

## 2023-03-01 DIAGNOSIS — E78.5 DYSLIPIDEMIA (HIGH LDL; LOW HDL): ICD-10-CM

## 2023-03-01 DIAGNOSIS — R07.9 CHEST PAIN, UNSPECIFIED TYPE: ICD-10-CM

## 2023-03-01 PROCEDURE — 93000 ELECTROCARDIOGRAM COMPLETE: CPT | Performed by: NURSE PRACTITIONER

## 2023-03-01 PROCEDURE — 3046F HEMOGLOBIN A1C LEVEL >9.0%: CPT | Performed by: NURSE PRACTITIONER

## 2023-03-01 PROCEDURE — G8427 DOCREV CUR MEDS BY ELIG CLIN: HCPCS | Performed by: NURSE PRACTITIONER

## 2023-03-01 PROCEDURE — 1090F PRES/ABSN URINE INCON ASSESS: CPT | Performed by: NURSE PRACTITIONER

## 2023-03-01 PROCEDURE — 1101F PT FALLS ASSESS-DOCD LE1/YR: CPT | Performed by: NURSE PRACTITIONER

## 2023-03-01 PROCEDURE — 2022F DILAT RTA XM EVC RTNOPTHY: CPT | Performed by: NURSE PRACTITIONER

## 2023-03-01 PROCEDURE — 3079F DIAST BP 80-89 MM HG: CPT | Performed by: NURSE PRACTITIONER

## 2023-03-01 PROCEDURE — 3077F SYST BP >= 140 MM HG: CPT | Performed by: NURSE PRACTITIONER

## 2023-03-01 PROCEDURE — G9711 PT HX TOT COL OR COLON CA: HCPCS | Performed by: NURSE PRACTITIONER

## 2023-03-01 PROCEDURE — G8417 CALC BMI ABV UP PARAM F/U: HCPCS | Performed by: NURSE PRACTITIONER

## 2023-03-01 PROCEDURE — 99214 OFFICE O/P EST MOD 30 MIN: CPT | Performed by: NURSE PRACTITIONER

## 2023-03-01 PROCEDURE — G8536 NO DOC ELDER MAL SCRN: HCPCS | Performed by: NURSE PRACTITIONER

## 2023-03-01 PROCEDURE — 1123F ACP DISCUSS/DSCN MKR DOCD: CPT | Performed by: NURSE PRACTITIONER

## 2023-03-01 PROCEDURE — G8432 DEP SCR NOT DOC, RNG: HCPCS | Performed by: NURSE PRACTITIONER

## 2023-03-01 NOTE — PROGRESS NOTES
Identified pt with two pt identifiers(name and ). Reviewed record in preparation for visit and have obtained necessary documentation. Chief Complaint   Patient presents with    Hypertension     4 month follow up    Cholesterol Problem      Vitals:    23 1036   BP: (!) 140/88   Pulse: 67   Resp: 18   Temp: 97.4 °F (36.3 °C)   TempSrc: Temporal   SpO2: 100%   Weight: 170 lb (77.1 kg)   Height: 5' (1.524 m)   PainSc:   0 - No pain       Medications reviewed/approved by provider. Health Maintenance Review: Patient reminded of \"due or due soon\" health maintenance. I have asked the patient to contact his/her primary care provider (PCP) for follow-up on his/her health maintenance. Coordination of Care Questionnaire:  :   1) Have you been to an emergency room, urgent care, or hospitalized since your last visit? If yes, where when, and reason for visit? no      2. Have seen or consulted any other health care provider since your last visit? If yes, where when, and reason for visit? YES urgent care for a virus       Patient is accompanied by self I have received verbal consent from Danica Chaney to discuss any/all medical information while they are present in the room.

## 2023-04-18 ENCOUNTER — HOSPITAL ENCOUNTER (OUTPATIENT)
Dept: GENERAL RADIOLOGY | Age: 73
Discharge: HOME | End: 2023-04-18
Attending: NURSE PRACTITIONER
Payer: MEDICARE

## 2023-04-18 DIAGNOSIS — Z78.0 ASYMPTOMATIC MENOPAUSAL STATE: ICD-10-CM

## 2023-04-18 PROCEDURE — 77080 DXA BONE DENSITY AXIAL: CPT

## 2023-04-23 DIAGNOSIS — Z78.0 ASYMPTOMATIC MENOPAUSAL STATE: Primary | ICD-10-CM

## 2023-07-18 ENCOUNTER — HOSPITAL ENCOUNTER (OUTPATIENT)
Facility: HOSPITAL | Age: 73
Setting detail: INFUSION SERIES
End: 2023-07-18
Payer: MEDICARE

## 2023-07-18 VITALS
DIASTOLIC BLOOD PRESSURE: 70 MMHG | BODY MASS INDEX: 32.77 KG/M2 | TEMPERATURE: 98.4 F | SYSTOLIC BLOOD PRESSURE: 149 MMHG | WEIGHT: 167.8 LBS | HEART RATE: 67 BPM | RESPIRATION RATE: 18 BRPM | OXYGEN SATURATION: 98 %

## 2023-07-18 DIAGNOSIS — C50.919 MALIGNANT NEOPLASM OF FEMALE BREAST, UNSPECIFIED ESTROGEN RECEPTOR STATUS, UNSPECIFIED LATERALITY, UNSPECIFIED SITE OF BREAST (HCC): Primary | ICD-10-CM

## 2023-07-18 PROCEDURE — 6360000002 HC RX W HCPCS: Performed by: INTERNAL MEDICINE

## 2023-07-18 PROCEDURE — 96372 THER/PROPH/DIAG INJ SC/IM: CPT

## 2023-07-18 RX ORDER — EPINEPHRINE 1 MG/ML
0.3 INJECTION, SOLUTION, CONCENTRATE INTRAVENOUS PRN
OUTPATIENT
Start: 2024-01-14

## 2023-07-18 RX ORDER — M-VIT,TX,IRON,MINS/CALC/FOLIC 27MG-0.4MG
1 TABLET ORAL DAILY
COMMUNITY

## 2023-07-18 RX ORDER — ALBUTEROL SULFATE 90 UG/1
4 AEROSOL, METERED RESPIRATORY (INHALATION) PRN
OUTPATIENT
Start: 2024-01-14

## 2023-07-18 RX ORDER — ACETAMINOPHEN 325 MG/1
650 TABLET ORAL
Status: CANCELLED | OUTPATIENT
Start: 2023-07-18

## 2023-07-18 RX ORDER — DIPHENHYDRAMINE HYDROCHLORIDE 50 MG/ML
50 INJECTION INTRAMUSCULAR; INTRAVENOUS
OUTPATIENT
Start: 2024-01-14

## 2023-07-18 RX ORDER — ACETAMINOPHEN 325 MG/1
650 TABLET ORAL
OUTPATIENT
Start: 2024-01-14

## 2023-07-18 RX ORDER — DIPHENHYDRAMINE HYDROCHLORIDE 50 MG/ML
50 INJECTION INTRAMUSCULAR; INTRAVENOUS
Status: CANCELLED | OUTPATIENT
Start: 2023-07-18

## 2023-07-18 RX ORDER — SODIUM CHLORIDE 9 MG/ML
INJECTION, SOLUTION INTRAVENOUS CONTINUOUS
OUTPATIENT
Start: 2024-01-14

## 2023-07-18 RX ORDER — SODIUM CHLORIDE 9 MG/ML
INJECTION, SOLUTION INTRAVENOUS CONTINUOUS
Status: CANCELLED | OUTPATIENT
Start: 2023-07-18

## 2023-07-18 RX ORDER — OMEGA-3S/DHA/EPA/FISH OIL/D3 300MG-1000
600 CAPSULE ORAL DAILY
COMMUNITY

## 2023-07-18 RX ORDER — ONDANSETRON 2 MG/ML
8 INJECTION INTRAMUSCULAR; INTRAVENOUS
OUTPATIENT
Start: 2024-01-14

## 2023-07-18 RX ORDER — EPINEPHRINE 1 MG/ML
0.3 INJECTION, SOLUTION, CONCENTRATE INTRAVENOUS PRN
Status: CANCELLED | OUTPATIENT
Start: 2023-07-18

## 2023-07-18 RX ORDER — ONDANSETRON 2 MG/ML
8 INJECTION INTRAMUSCULAR; INTRAVENOUS
Status: CANCELLED | OUTPATIENT
Start: 2023-07-18

## 2023-07-18 RX ORDER — ALBUTEROL SULFATE 90 UG/1
4 AEROSOL, METERED RESPIRATORY (INHALATION) PRN
Status: CANCELLED | OUTPATIENT
Start: 2023-07-18

## 2023-07-18 RX ADMIN — DENOSUMAB 60 MG: 60 INJECTION SUBCUTANEOUS at 09:26

## 2023-07-18 ASSESSMENT — PAIN SCALES - GENERAL: PAINLEVEL_OUTOF10: 0

## 2023-07-24 ENCOUNTER — OFFICE VISIT (OUTPATIENT)
Age: 73
End: 2023-07-24

## 2023-07-24 VITALS
WEIGHT: 168 LBS | DIASTOLIC BLOOD PRESSURE: 70 MMHG | BODY MASS INDEX: 32.98 KG/M2 | OXYGEN SATURATION: 98 % | HEIGHT: 60 IN | RESPIRATION RATE: 16 BRPM | HEART RATE: 68 BPM | SYSTOLIC BLOOD PRESSURE: 126 MMHG | TEMPERATURE: 96.8 F

## 2023-07-24 DIAGNOSIS — Z85.038 PERSONAL HISTORY OF OTHER MALIGNANT NEOPLASM OF LARGE INTESTINE: Primary | ICD-10-CM

## 2023-07-24 DIAGNOSIS — D12.6 HIGH GRADE DYSPLASIA IN COLONIC ADENOMA: ICD-10-CM

## 2023-07-24 DIAGNOSIS — R01.1 HEART MURMUR: ICD-10-CM

## 2023-07-24 ASSESSMENT — PATIENT HEALTH QUESTIONNAIRE - PHQ9
SUM OF ALL RESPONSES TO PHQ9 QUESTIONS 1 & 2: 0
SUM OF ALL RESPONSES TO PHQ QUESTIONS 1-9: 0
1. LITTLE INTEREST OR PLEASURE IN DOING THINGS: 0
2. FEELING DOWN, DEPRESSED OR HOPELESS: 0
SUM OF ALL RESPONSES TO PHQ QUESTIONS 1-9: 0

## 2023-07-24 NOTE — PROGRESS NOTES
Identified pt with two pt identifiers (name and ). Reviewed chart in preparation for visit and have obtained necessary documentation. Noel Godoy is a 74 y.o. female Colonoscopy  . Vitals:    23 0910   Weight: 168 lb (76.2 kg)   Height: 5' (1.524 m)          1. Have you been to the ER, urgent care clinic since your last visit? Hospitalized since your last visit?  no     2. Have you seen or consulted any other health care providers outside of the [address] [place] [address] since your last visit? Include any pap smears or colon screening.   no

## 2023-07-24 NOTE — PROGRESS NOTES
Domenica Estrada is a 74 y.o. female who presents today with the following:  Chief Complaint   Patient presents with    Colonoscopy       HPI    68-year-old female known to me patient of Dr. Geary Gaucher and Radha Duarte who presents for surveillance colonoscopy. I performed her last colonoscopy in July 2022 with findings of a polyp at 65 cm which had high-grade dysplasia. This was removed in its entirety by hot snare polypectomy. She has a personal history of colorectal cancer and had what appears to be a sigmoid colectomy in 1998. She denies any melena or hematochezia or diarrhea or constipation. She does occasionally get some bloating when she eats certain foods. She has had no unexpected weight loss and no change in the caliber of her stool and no recent stool testing. Her father had colon cancer when he was 77. She has had a left oophorectomy and appendectomy as well as her sigmoid colectomy. She has had bilateral mastectomies for breast cancer. She has a history of a small bowel obstruction that did not require surgical intervention. She also has a history of endometriosis. She has a history of hypertension and hyperlipidemia and diabetes for which she is on metformin. She is followed by cardiology for what sounds like a vasovagal episode that happened in the remote past.  She was last seen in March of this year was told just to follow-up in 6 months but no new abnormalities were detected. She does take an 81 mg aspirin a day.     She has been vaccinated for COVID  Past Medical History:   Diagnosis Date    Arthritis     Breast CA ([address])     left    Cancer ([address]) 1998    colon cancer    Diabetes ([address])     DM ll    Dyslipidemia (high LDL; low HDL) 10/12/2010    Essential hypertension, benign 10/12/2010    Family history of coronary artery disease 10/12/2010    FH: bowel obstruction 9/1/2015    Hypercholesteremia     Hypertension     Ill-defined condition     elevated cholesterol    Postmenopausal

## 2023-07-26 RX ORDER — POLYETHYLENE GLYCOL 3350, SODIUM SULFATE ANHYDROUS, SODIUM BICARBONATE, SODIUM CHLORIDE, POTASSIUM CHLORIDE 236; 22.74; 6.74; 5.86; 2.97 G/4L; G/4L; G/4L; G/4L; G/4L
4 POWDER, FOR SOLUTION ORAL ONCE
Qty: 4000 ML | Refills: 0 | Status: SHIPPED | OUTPATIENT
Start: 2023-07-26 | End: 2023-07-26

## 2023-07-28 ENCOUNTER — TELEPHONE (OUTPATIENT)
Age: 73
End: 2023-07-28

## 2023-07-28 DIAGNOSIS — R01.1 CARDIAC MURMUR: Primary | ICD-10-CM

## 2023-07-28 NOTE — TELEPHONE ENCOUNTER
Juan Smith, RACH - NP  Keenan Welsh LPN      Needs cardiac clearance---dr Magi Arias is concerned because of murmur. I ordered echo, pls call patient. Then see me august 10 Thursday PM instead of august 11. Pt has been informed. Verified patient with two patient identifiers. Offered to provide the telephone number for central scheduling and pt declined.

## 2023-07-28 NOTE — TELEPHONE ENCOUNTER
Mady with [facility] Called and is asking for a call back from Godwin regarding the patient.  Please advise    [phone]

## 2023-08-07 NOTE — PROGRESS NOTES
[facility] Cardiology     [facility], [facility], [address]  [address] [place] [address], [facility]   [address]     Subjective:          Todd Gonzalez is a 74 y.o. female is here for routine f/u. Andres Ruff Pmhx  hypertension, dyslipidemia, colon ca (s/p  hemicolectomy), breast ca--s/p bilat mastectomies, XRT/Chemo;       Last seen by Dr Florencio Reich in 8/23/2021:    Hx hypertension, dyslipidemia, colon ca (s/p hemicolectomy), breast ca--s/p bilat mastectomies, XRT/Chemo; No known cardiac hx. Episode of syncope after spell of abdominal pain, N/V, diarrhea, cramping and got up from commode--passed out. Initial ED w/u  neg--told likely vasovagal. One episode 30-40 yrs ago. Seen by Dr. Mann Screen testing: ECHO 10/6/16--normal LV size/walls, LVEF 65-70, mild MR, RSVP 42. Carotid dopplers 75/1/72--7-05% ALONZO, 32-28% LICA, bilat antegrade vertebrals. Holter monitor  10/16 with NSR, PAC's, one 3 beat PAT run. Had some issues with LE edema, improved after reduced Na. Over 2018 summer had confusion/weakness--prob TIA, at [facility] with neg CV w/u. Echo 7/18 with LVEF 76-34, grade I diastolic, valves ok. No  CV sx or complaints currently other than occ palpitations, vague discomfort related to stress/anxiety--non-exertional..  Continues to see PCP and Oncology with recent OV. Last ECHO 8/26/20 with LVEF 76%, grade I diastolic, valves ok. Labs 6/9/21  with CBC normal, CMP normal except BUN 23 (Cr 0.8), chol 198, , HDL 56, , HbA1c 6.33. Seen by me in 3/2022:  episode of chest discomfort about 2 weeks ago, lasted a few  minutes, relieved with Rolaid. No sob. She had a negative NST in 5/2022      She is s/p colonoscopy in 7/2022 performed by Dr Baljinder Quinones -- with findings of a small tubular  adenoma with high-grade dysplasia which was removed in its entirety at the time of the procedure. She also had diverticulosis.       Last seen by me in 3/2023

## 2023-08-08 ENCOUNTER — HOSPITAL ENCOUNTER (OUTPATIENT)
Facility: HOSPITAL | Age: 73
Discharge: HOME | End: 2023-08-11
Payer: MEDICARE

## 2023-08-08 DIAGNOSIS — R01.1 CARDIAC MURMUR: ICD-10-CM

## 2023-08-08 LAB
ECHO AO ROOT DIAM: 2.6 CM
ECHO AV PEAK GRADIENT: 8 MMHG
ECHO AV PEAK VELOCITY: 1.4 M/S
ECHO EST RA PRESSURE: 8 MMHG
ECHO LA DIAMETER: 3 CM
ECHO LA TO AORTIC ROOT RATIO: 1.15
ECHO LA VOL 2C: 47 ML (ref 22–52)
ECHO LA VOL 2C: 49 ML (ref 22–52)
ECHO LA VOL 4C: 40 ML (ref 22–52)
ECHO LA VOL 4C: 44 ML (ref 22–52)
ECHO LA VOLUME AREA LENGTH: 47 ML
ECHO LV E' LATERAL VELOCITY: 9 CM/S
ECHO LV E' SEPTAL VELOCITY: 7 CM/S
ECHO LV FRACTIONAL SHORTENING: 31 % (ref 28–44)
ECHO LV INTERNAL DIMENSION DIASTOLIC: 4.2 CM (ref 3.9–5.3)
ECHO LV INTERNAL DIMENSION SYSTOLIC: 2.9 CM
ECHO LV IVSD: 1 CM (ref 0.6–0.9)
ECHO LV MASS 2D: 137.2 G (ref 67–162)
ECHO LV POSTERIOR WALL DIASTOLIC: 1 CM (ref 0.6–0.9)
ECHO LV RELATIVE WALL THICKNESS RATIO: 0.48
ECHO LVOT AREA: 2.3 CM2
ECHO LVOT DIAM: 1.7 CM
ECHO MV A VELOCITY: 1.09 M/S
ECHO MV E DECELERATION TIME (DT): 217.9 MS
ECHO MV E VELOCITY: 0.81 M/S
ECHO MV E/A RATIO: 0.74
ECHO MV E/E' LATERAL: 9
ECHO MV E/E' RATIO (AVERAGED): 10.29
ECHO MV E/E' SEPTAL: 11.57
ECHO PULMONARY ARTERY END DIASTOLIC PRESSURE: 6 MMHG
ECHO PULMONARY ARTERY SYSTOLIC PRESSURE (PASP): 35 MMHG
ECHO PV REGURGITANT MAX VELOCITY: 1.2 M/S
ECHO RIGHT VENTRICULAR SYSTOLIC PRESSURE (RVSP): 30 MMHG
ECHO RV TAPSE: 1.5 CM (ref 1.7–?)
ECHO TV REGURGITANT MAX VELOCITY: 2.36 M/S
ECHO TV REGURGITANT PEAK GRADIENT: 22 MMHG

## 2023-08-08 PROCEDURE — 93306 TTE W/DOPPLER COMPLETE: CPT

## 2023-08-09 NOTE — RESULT ENCOUNTER NOTE
Echo showed normal heart pumping strength, mild-moderate valvular disease but stable.  No need to call, will see pt tomorrow

## 2023-08-10 ENCOUNTER — OFFICE VISIT (OUTPATIENT)
Age: 73
End: 2023-08-10
Payer: MEDICARE

## 2023-08-10 VITALS
OXYGEN SATURATION: 98 % | HEIGHT: 60 IN | SYSTOLIC BLOOD PRESSURE: 130 MMHG | TEMPERATURE: 98.2 F | RESPIRATION RATE: 20 BRPM | HEART RATE: 80 BPM | BODY MASS INDEX: 32.79 KG/M2 | DIASTOLIC BLOOD PRESSURE: 82 MMHG | WEIGHT: 167 LBS

## 2023-08-10 DIAGNOSIS — I34.0 MITRAL VALVE INSUFFICIENCY, UNSPECIFIED ETIOLOGY: ICD-10-CM

## 2023-08-10 DIAGNOSIS — Z01.818 PREOPERATIVE CLEARANCE: ICD-10-CM

## 2023-08-10 DIAGNOSIS — I10 ESSENTIAL (PRIMARY) HYPERTENSION: ICD-10-CM

## 2023-08-10 DIAGNOSIS — E11.8 TYPE 2 DIABETES MELLITUS WITH UNSPECIFIED COMPLICATIONS (HCC): ICD-10-CM

## 2023-08-10 DIAGNOSIS — E78.2 MIXED HYPERLIPIDEMIA: ICD-10-CM

## 2023-08-10 DIAGNOSIS — R01.1 CARDIAC MURMUR: Primary | ICD-10-CM

## 2023-08-10 PROCEDURE — 1123F ACP DISCUSS/DSCN MKR DOCD: CPT | Performed by: NURSE PRACTITIONER

## 2023-08-10 PROCEDURE — 3079F DIAST BP 80-89 MM HG: CPT | Performed by: NURSE PRACTITIONER

## 2023-08-10 PROCEDURE — 3017F COLORECTAL CA SCREEN DOC REV: CPT | Performed by: NURSE PRACTITIONER

## 2023-08-10 PROCEDURE — G8399 PT W/DXA RESULTS DOCUMENT: HCPCS | Performed by: NURSE PRACTITIONER

## 2023-08-10 PROCEDURE — G8427 DOCREV CUR MEDS BY ELIG CLIN: HCPCS | Performed by: NURSE PRACTITIONER

## 2023-08-10 PROCEDURE — 93000 ELECTROCARDIOGRAM COMPLETE: CPT | Performed by: NURSE PRACTITIONER

## 2023-08-10 PROCEDURE — 1036F TOBACCO NON-USER: CPT | Performed by: NURSE PRACTITIONER

## 2023-08-10 PROCEDURE — G8417 CALC BMI ABV UP PARAM F/U: HCPCS | Performed by: NURSE PRACTITIONER

## 2023-08-10 PROCEDURE — 3075F SYST BP GE 130 - 139MM HG: CPT | Performed by: NURSE PRACTITIONER

## 2023-08-10 PROCEDURE — 1090F PRES/ABSN URINE INCON ASSESS: CPT | Performed by: NURSE PRACTITIONER

## 2023-08-10 PROCEDURE — 3046F HEMOGLOBIN A1C LEVEL >9.0%: CPT | Performed by: NURSE PRACTITIONER

## 2023-08-10 PROCEDURE — 99214 OFFICE O/P EST MOD 30 MIN: CPT | Performed by: NURSE PRACTITIONER

## 2023-08-10 PROCEDURE — 2022F DILAT RTA XM EVC RTNOPTHY: CPT | Performed by: NURSE PRACTITIONER

## 2023-08-14 NOTE — PERIOP NOTE
[address]  SURGICAL PRE-ADMISSION INSTRUCTIONS    ARRIVAL  You will be called the day before your surgery with your expected arrival time. Sign in at the  of the hospital.  You will be directed to the Surgical Waiting Room. Please arrive at your scheduled appointment time. You have been scheduled to arrive for your procedure one or two hours prior to the expected start time of your procedure. Every effort will be made to minimize your wait but please be aware that unforeseen circumstances may affect our schedule. EATING  DO NOT EAT OR DRINK ANYTHING AFTER MIDNIGHT ON THE EVENING BEFORE YOUR SURGERY OR ON THE DAY OF YOUR SURGERY except for your medications (as instructed) with a sip of water. Do not use gum, mints or lozenges on the morning of your surgery. Please do not smoke or chew tobacco before your surgery. MEDICATIONS   Take the following medications on the morning of your surgery with the smallest amount of water possible : Amlodipine    STOP THESE MEDICATIONS AT THE TIMES LISTED BELOW  Aspirin ;  7 days before                                                NSAIDS (Indocin, Ibuprofen, Naprosyn) ;  7 days before     DRIVING/TRANSPORATION  Have a responsible adult to drive you home from the hospital and to stay with you over night. Please have them plan to remain in the hospital during your surgery. Your surgery will not be done if you do not have a responsible adult to take you home and to stay with you. If you have arranged for public transport, you must have a responsible adult to ride with you (who is not the ). You may not drive for 24 hours after anesthesia. PREPARATION  If you have a Living WiIl/Advance Directive, please bring a copy with you to scan into your chart. Please DO NOT wear makeup or nail polish  Please leave valuables at home,  DO NOT wear jewelry.   Wear loose, comfortable clothing that is large enough to cover a bulky

## 2023-08-16 ENCOUNTER — PREP FOR PROCEDURE (OUTPATIENT)
Age: 73
End: 2023-08-16

## 2023-08-16 RX ORDER — SODIUM CHLORIDE 0.9 % (FLUSH) 0.9 %
5-40 SYRINGE (ML) INJECTION PRN
Status: CANCELLED | OUTPATIENT
Start: 2023-08-16

## 2023-08-16 RX ORDER — SODIUM CHLORIDE 9 MG/ML
INJECTION, SOLUTION INTRAVENOUS PRN
Status: CANCELLED | OUTPATIENT
Start: 2023-08-16

## 2023-08-16 RX ORDER — SODIUM CHLORIDE 0.9 % (FLUSH) 0.9 %
5-40 SYRINGE (ML) INJECTION EVERY 12 HOURS SCHEDULED
Status: CANCELLED | OUTPATIENT
Start: 2023-08-16

## 2023-08-16 RX ORDER — SODIUM CHLORIDE, SODIUM LACTATE, POTASSIUM CHLORIDE, CALCIUM CHLORIDE 600; 310; 30; 20 MG/100ML; MG/100ML; MG/100ML; MG/100ML
INJECTION, SOLUTION INTRAVENOUS CONTINUOUS
Status: CANCELLED | OUTPATIENT
Start: 2023-08-16

## 2023-08-16 NOTE — H&P
HPI    75-year-old female known to me patient of Dr. Aurelia Alves and Meryle Catholic who presents for surveillance colonoscopy. I performed her last colonoscopy in July 2022 with findings of a polyp at 65 cm which had high-grade dysplasia. This was removed in its entirety by hot snare polypectomy. She has a personal history of colorectal cancer and had what appears to be a sigmoid colectomy in 1998. She denies any melena or hematochezia or diarrhea or constipation. She does occasionally get some bloating when she eats certain foods. She has had no unexpected weight loss and no change in the caliber of her stool and no recent stool testing. Her father had colon cancer when he was 77. She has had a left oophorectomy and appendectomy as well as her sigmoid colectomy. She has had bilateral mastectomies for breast cancer. She has a history of a small bowel obstruction that did not require surgical intervention. She also has a history of endometriosis. She has a history of hypertension and hyperlipidemia and diabetes for which she is on metformin. She is followed by cardiology for what sounds like a vasovagal episode that happened in the remote past.  She was last seen in March of this year was told just to follow-up in 6 months but no new abnormalities were detected. She does take an 81 mg aspirin a day.     She has been vaccinated for COVID  Past Medical History:   Diagnosis Date    Arthritis     Breast CA ([address])     left    Cancer ([address]) 1998    colon cancer    Diabetes ([address])     DM ll    Dyslipidemia (high LDL; low HDL) 10/12/2010    Essential hypertension, benign 10/12/2010    Family history of coronary artery disease 10/12/2010    FH: bowel obstruction 9/1/2015    Hypercholesteremia     Hypertension     Ill-defined condition     elevated cholesterol    Postmenopausal     S/P chemotherapy, time since greater than 12 weeks     S/P left hemicolectomy 10/12/2010       Past Surgical History:   Procedure

## 2023-08-16 NOTE — H&P (VIEW-ONLY)
HPI    78-year-old female known to me patient of Dr. Aurelia Alves and Meryle Catholic who presents for surveillance colonoscopy. I performed her last colonoscopy in July 2022 with findings of a polyp at 65 cm which had high-grade dysplasia. This was removed in its entirety by hot snare polypectomy. She has a personal history of colorectal cancer and had what appears to be a sigmoid colectomy in 1998. She denies any melena or hematochezia or diarrhea or constipation. She does occasionally get some bloating when she eats certain foods. She has had no unexpected weight loss and no change in the caliber of her stool and no recent stool testing. Her father had colon cancer when he was 77. She has had a left oophorectomy and appendectomy as well as her sigmoid colectomy. She has had bilateral mastectomies for breast cancer. She has a history of a small bowel obstruction that did not require surgical intervention. She also has a history of endometriosis. She has a history of hypertension and hyperlipidemia and diabetes for which she is on metformin. She is followed by cardiology for what sounds like a vasovagal episode that happened in the remote past.  She was last seen in March of this year was told just to follow-up in 6 months but no new abnormalities were detected. She does take an 81 mg aspirin a day.     She has been vaccinated for COVID  Past Medical History:   Diagnosis Date    Arthritis     Breast CA ([address])     left    Cancer ([address]) 1998    colon cancer    Diabetes ([address])     DM ll    Dyslipidemia (high LDL; low HDL) 10/12/2010    Essential hypertension, benign 10/12/2010    Family history of coronary artery disease 10/12/2010    FH: bowel obstruction 9/1/2015    Hypercholesteremia     Hypertension     Ill-defined condition     elevated cholesterol    Postmenopausal     S/P chemotherapy, time since greater than 12 weeks     S/P left hemicolectomy 10/12/2010       Past Surgical History:   Procedure

## 2023-08-17 ENCOUNTER — ANESTHESIA EVENT (OUTPATIENT)
Facility: HOSPITAL | Age: 73
End: 2023-08-17
Payer: MEDICARE

## 2023-08-17 ENCOUNTER — HOSPITAL ENCOUNTER (OUTPATIENT)
Facility: HOSPITAL | Age: 73
Setting detail: OUTPATIENT SURGERY
Discharge: HOME | End: 2023-08-17
Attending: SURGERY | Admitting: SURGERY
Payer: MEDICARE

## 2023-08-17 ENCOUNTER — ANESTHESIA (OUTPATIENT)
Facility: HOSPITAL | Age: 73
End: 2023-08-17
Payer: MEDICARE

## 2023-08-17 VITALS
RESPIRATION RATE: 13 BRPM | DIASTOLIC BLOOD PRESSURE: 69 MMHG | HEIGHT: 60 IN | OXYGEN SATURATION: 99 % | HEART RATE: 50 BPM | WEIGHT: 167 LBS | TEMPERATURE: 98.2 F | BODY MASS INDEX: 32.79 KG/M2 | SYSTOLIC BLOOD PRESSURE: 128 MMHG

## 2023-08-17 PROBLEM — D12.6 HIGH GRADE DYSPLASIA IN COLONIC ADENOMA: Status: ACTIVE | Noted: 2023-08-17

## 2023-08-17 PROBLEM — Z85.038 PERSONAL HISTORY OF COLON CANCER: Status: ACTIVE | Noted: 2023-08-17

## 2023-08-17 LAB
GLUCOSE BLD STRIP.AUTO-MCNC: 124 MG/DL (ref 65–117)
SERVICE CMNT-IMP: ABNORMAL

## 2023-08-17 PROCEDURE — 3700000000 HC ANESTHESIA ATTENDED CARE: Performed by: SURGERY

## 2023-08-17 PROCEDURE — 6360000002 HC RX W HCPCS: Performed by: ANESTHESIOLOGY

## 2023-08-17 PROCEDURE — 82962 GLUCOSE BLOOD TEST: CPT

## 2023-08-17 PROCEDURE — 7100000000 HC PACU RECOVERY - FIRST 15 MIN: Performed by: SURGERY

## 2023-08-17 PROCEDURE — 2580000003 HC RX 258: Performed by: SURGERY

## 2023-08-17 PROCEDURE — 3600000002 HC SURGERY LEVEL 2 BASE: Performed by: SURGERY

## 2023-08-17 PROCEDURE — 3700000001 HC ADD 15 MINUTES (ANESTHESIA): Performed by: SURGERY

## 2023-08-17 PROCEDURE — 7100000001 HC PACU RECOVERY - ADDTL 15 MIN: Performed by: SURGERY

## 2023-08-17 PROCEDURE — G0105 COLORECTAL SCRN; HI RISK IND: HCPCS | Performed by: SURGERY

## 2023-08-17 PROCEDURE — 3600000012 HC SURGERY LEVEL 2 ADDTL 15MIN: Performed by: SURGERY

## 2023-08-17 PROCEDURE — 2500000003 HC RX 250 WO HCPCS: Performed by: ANESTHESIOLOGY

## 2023-08-17 PROCEDURE — 2709999900 HC NON-CHARGEABLE SUPPLY: Performed by: SURGERY

## 2023-08-17 RX ORDER — SODIUM CHLORIDE 0.9 % (FLUSH) 0.9 %
5-40 SYRINGE (ML) INJECTION EVERY 12 HOURS SCHEDULED
Status: DISCONTINUED | OUTPATIENT
Start: 2023-08-17 | End: 2023-08-17 | Stop reason: HOSPADM

## 2023-08-17 RX ORDER — SODIUM CHLORIDE 0.9 % (FLUSH) 0.9 %
5-40 SYRINGE (ML) INJECTION PRN
Status: DISCONTINUED | OUTPATIENT
Start: 2023-08-17 | End: 2023-08-17 | Stop reason: HOSPADM

## 2023-08-17 RX ORDER — SODIUM CHLORIDE 9 MG/ML
INJECTION, SOLUTION INTRAVENOUS PRN
Status: DISCONTINUED | OUTPATIENT
Start: 2023-08-17 | End: 2023-08-17 | Stop reason: HOSPADM

## 2023-08-17 RX ORDER — LIDOCAINE HYDROCHLORIDE 20 MG/ML
INJECTION, SOLUTION EPIDURAL; INFILTRATION; INTRACAUDAL; PERINEURAL PRN
Status: DISCONTINUED | OUTPATIENT
Start: 2023-08-17 | End: 2023-08-17 | Stop reason: SDUPTHER

## 2023-08-17 RX ORDER — PROPOFOL 10 MG/ML
INJECTION, EMULSION INTRAVENOUS PRN
Status: DISCONTINUED | OUTPATIENT
Start: 2023-08-17 | End: 2023-08-17 | Stop reason: SDUPTHER

## 2023-08-17 RX ORDER — SODIUM CHLORIDE, SODIUM LACTATE, POTASSIUM CHLORIDE, CALCIUM CHLORIDE 600; 310; 30; 20 MG/100ML; MG/100ML; MG/100ML; MG/100ML
INJECTION, SOLUTION INTRAVENOUS CONTINUOUS
Status: DISCONTINUED | OUTPATIENT
Start: 2023-08-17 | End: 2023-08-17 | Stop reason: HOSPADM

## 2023-08-17 RX ADMIN — LIDOCAINE HYDROCHLORIDE 100 MG: 20 INJECTION, SOLUTION EPIDURAL; INFILTRATION; INTRACAUDAL; PERINEURAL at 10:33

## 2023-08-17 RX ADMIN — PROPOFOL 100 MG: 10 INJECTION, EMULSION INTRAVENOUS at 10:48

## 2023-08-17 RX ADMIN — SODIUM CHLORIDE, POTASSIUM CHLORIDE, SODIUM LACTATE AND CALCIUM CHLORIDE: 600; 310; 30; 20 INJECTION, SOLUTION INTRAVENOUS at 09:58

## 2023-08-17 RX ADMIN — PROPOFOL 50 MG: 10 INJECTION, EMULSION INTRAVENOUS at 10:43

## 2023-08-17 RX ADMIN — PROPOFOL 100 MG: 10 INJECTION, EMULSION INTRAVENOUS at 10:34

## 2023-08-17 ASSESSMENT — PAIN - FUNCTIONAL ASSESSMENT: PAIN_FUNCTIONAL_ASSESSMENT: 0-10

## 2023-08-17 NOTE — BRIEF OP NOTE
Brief Postoperative Note      Patient: Eric Hutson  YOB: 1950  MRN: [id number]    Date of Procedure: 8/17/2023    Pre-Op Diagnosis Codes:     * Personal history of colon cancer [Z85.038]     * Colon adenoma [D12.6]     * Heart murmur [R01.1]    Post-Op Diagnosis: Same       Procedure(s):  COLONOSCOPY    Surgeon(s):  Kerry Henry MD    Assistant:  * No surgical staff found *    Anesthesia: Monitor Anesthesia Care    Estimated Blood Loss (mL): 0    Complications: None    Specimens:   * No specimens in log *    Implants:  * No implants in log *      Drains: * No LDAs found *    Findings:   Diverticulosis      Electronically signed by April Chow MD on 8/17/2023 at 10:58 AM

## 2023-08-17 NOTE — ANESTHESIA POSTPROCEDURE EVALUATION
Department of Anesthesiology  Postprocedure Note    Patient: Abelardo Musa  MRN: [id number]  YOB: 1950  Date of evaluation: 8/17/2023      Procedure Summary     Date: 08/17/23 Room / Location: [address] MAIN OR 1 / [address] MAIN OR    Anesthesia Start: 1031 Anesthesia Stop: 1059    Procedure: COLONOSCOPY (Lower GI Region) Diagnosis:       Personal history of colon cancer      Colon adenoma      Heart murmur      (Personal history of colon cancer [Z85.038])      (Colon adenoma [D12.6])      (Heart murmur [R01.1])    Surgeons: Claudia Reed MD Responsible Provider: Russell Farmer MD    Anesthesia Type: MAC ASA Status: 3          Anesthesia Type: No value filed.     Ford Phase I: Ford Score: 10    Ford Phase II:        Anesthesia Post Evaluation    Patient location during evaluation: bedside  Patient participation: complete - patient participated  Level of consciousness: awake and alert  Pain score: 0  Airway patency: patent  Nausea & Vomiting: no nausea  Complications: no  Cardiovascular status: hemodynamically stable  Respiratory status: acceptable  Hydration status: stable  Pain management: adequate

## 2023-08-17 NOTE — OP NOTE
[facility]  OPERATIVE REPORT    Name:  Negar Pastor  MR#:  [id number]  :  1950  ACCOUNT #:  [de-identified]  DATE OF SERVICE:  2023    PREOPERATIVE DIAGNOSIS:  History of high-grade dysplasia and colon polyp. POSTOPERATIVE DIAGNOSIS:  History of high-grade dysplasia and colon polyp. PROCEDURE PERFORMED:  Colonoscopy. SURGEON:  Leon Apple MD    ASSISTANT:  None. ANESTHESIA:  MAC.    COMPLICATIONS:  None. SPECIMENS REMOVED:  None. IMPLANTS:  None. ESTIMATED BLOOD LOSS:  Zero. FINDINGS:  Diverticulosis. DISPOSITION:  Stable. PROCEDURE:  The patient was brought to the operative theater. Monitoring devices and nasal cannula O2 were placed per Anesthesia and the patient was placed in the left side down decubitus position. IV sedation was administered and a time-out was performed. Digital rectal exam was performed, which was essentially normal.  A well-lubricated Olympus colonoscope was introduced in the patient's rectum and advanced to the cecum without any difficulty. The cecum was identified by the identification of the ileocecal valve and the appendiceal orifice. The prep was adequate to proceed. The scope was carefully withdrawn with the mucosal surfaces circumferentially evaluated. No significant abnormalities were noted in the cecum or the ascending colon. The patient did have scattered diverticulosis throughout the remainder of the colon. We saw no evidence of any significant polyps or other lesions other than the diverticulosis. Particular attention was paid towards 65 cm more, no abnormalities were identified. The scope was pulled back into the rectum and retroflexed, which did not show any lesions other than mild hemorrhoids. The scope was then straightened out and carefully withdrawn. The patient tolerated the procedure well and was transferred to the PACU in stable condition.     Recommendations will be for a repeat colonoscopy in three years because of the fact that one of her biopsies last year showed high-grade dysplasia and a small colon polyp and her personal history of colon cancer.         Ella Pierre MD      [id number]  D:  08/17/2023 11:04  T:  08/17/2023 11:34  JOB #:  [id number]  CC:  Peyton Esparza MD

## 2023-08-17 NOTE — INTERVAL H&P NOTE
Update History & Physical    The patient's History and Physical of August 16, 2023 was reviewed with the patient and I examined the patient. There was no change. The surgical site was confirmed by the patient and me. Plan: The risks, benefits, expected outcome, and alternative to the recommended procedure have been discussed with the patient. Patient understands and wants to proceed with the procedure.      Electronically signed by Kika Moon MD on 8/17/2023 at 10:25 AM

## 2024-01-30 ENCOUNTER — HOSPITAL ENCOUNTER (OUTPATIENT)
Facility: HOSPITAL | Age: 74
Setting detail: INFUSION SERIES
Discharge: HOME | End: 2024-01-30
Payer: MEDICARE

## 2024-01-30 VITALS
DIASTOLIC BLOOD PRESSURE: 67 MMHG | BODY MASS INDEX: 33.77 KG/M2 | RESPIRATION RATE: 16 BRPM | HEART RATE: 62 BPM | HEIGHT: 60 IN | SYSTOLIC BLOOD PRESSURE: 144 MMHG | WEIGHT: 172 LBS | OXYGEN SATURATION: 98 % | TEMPERATURE: 97.8 F

## 2024-01-30 DIAGNOSIS — C50.919 MALIGNANT NEOPLASM OF FEMALE BREAST, UNSPECIFIED ESTROGEN RECEPTOR STATUS, UNSPECIFIED LATERALITY, UNSPECIFIED SITE OF BREAST (HCC): Primary | ICD-10-CM

## 2024-01-30 PROCEDURE — 6360000002 HC RX W HCPCS: Performed by: INTERNAL MEDICINE

## 2024-01-30 PROCEDURE — 96372 THER/PROPH/DIAG INJ SC/IM: CPT

## 2024-01-30 RX ORDER — DIPHENHYDRAMINE HYDROCHLORIDE 50 MG/ML
50 INJECTION INTRAMUSCULAR; INTRAVENOUS
OUTPATIENT
Start: 2024-07-14

## 2024-01-30 RX ORDER — ONDANSETRON 2 MG/ML
8 INJECTION INTRAMUSCULAR; INTRAVENOUS
Status: DISCONTINUED | OUTPATIENT
Start: 2024-01-30 | End: 2024-01-31 | Stop reason: HOSPADM

## 2024-01-30 RX ORDER — SODIUM CHLORIDE 9 MG/ML
INJECTION, SOLUTION INTRAVENOUS CONTINUOUS
Status: DISCONTINUED | OUTPATIENT
Start: 2024-01-30 | End: 2024-01-31 | Stop reason: HOSPADM

## 2024-01-30 RX ORDER — EPINEPHRINE 1 MG/ML
0.3 INJECTION, SOLUTION, CONCENTRATE INTRAVENOUS PRN
Status: DISCONTINUED | OUTPATIENT
Start: 2024-01-30 | End: 2024-01-31 | Stop reason: HOSPADM

## 2024-01-30 RX ORDER — ONDANSETRON 2 MG/ML
8 INJECTION INTRAMUSCULAR; INTRAVENOUS
OUTPATIENT
Start: 2024-07-14

## 2024-01-30 RX ORDER — ACETAMINOPHEN 325 MG/1
650 TABLET ORAL
OUTPATIENT
Start: 2024-07-14

## 2024-01-30 RX ORDER — SODIUM CHLORIDE 9 MG/ML
INJECTION, SOLUTION INTRAVENOUS CONTINUOUS
OUTPATIENT
Start: 2024-07-14

## 2024-01-30 RX ORDER — ACETAMINOPHEN 325 MG/1
650 TABLET ORAL
Status: DISCONTINUED | OUTPATIENT
Start: 2024-01-30 | End: 2024-01-31 | Stop reason: HOSPADM

## 2024-01-30 RX ORDER — BENZONATATE 100 MG/1
100 CAPSULE ORAL 3 TIMES DAILY PRN
COMMUNITY
Start: 2023-11-09

## 2024-01-30 RX ORDER — ALBUTEROL SULFATE 90 UG/1
4 AEROSOL, METERED RESPIRATORY (INHALATION) PRN
OUTPATIENT
Start: 2024-07-14

## 2024-01-30 RX ORDER — DIPHENHYDRAMINE HYDROCHLORIDE 50 MG/ML
50 INJECTION INTRAMUSCULAR; INTRAVENOUS
Status: DISCONTINUED | OUTPATIENT
Start: 2024-01-30 | End: 2024-01-31 | Stop reason: HOSPADM

## 2024-01-30 RX ORDER — ALBUTEROL SULFATE 90 UG/1
4 AEROSOL, METERED RESPIRATORY (INHALATION) PRN
Status: DISCONTINUED | OUTPATIENT
Start: 2024-01-30 | End: 2024-01-31 | Stop reason: HOSPADM

## 2024-01-30 RX ORDER — EPINEPHRINE 1 MG/ML
0.3 INJECTION, SOLUTION, CONCENTRATE INTRAVENOUS PRN
OUTPATIENT
Start: 2024-07-14

## 2024-01-30 RX ADMIN — DENOSUMAB 60 MG: 60 INJECTION SUBCUTANEOUS at 10:12

## 2024-01-30 ASSESSMENT — PAIN SCALES - GENERAL: PAINLEVEL_OUTOF10: 0

## 2024-01-30 NOTE — PROGRESS NOTES
[facility] Progress Note    Date: 2024    Name: Iona Rizzo    MRN: [id number]         : 1950      Ms. Rizzo was assessed and education was provided.     Ms. Rizzo's vitals were reviewed and patient was observed for 5 minutes prior to treatment.   Vitals:    24 1000   BP: (!) 144/67   Pulse: 62   Resp: 16   Temp: 97.8 °F (36.6 °C)   SpO2: 98%     Lab results were obtained and reviewed.  No results found for this or any previous visit (from the past 12 hour(s)).    Prolia 60mg was administered subcutaneous in  right arm.     Ms. Rizzo tolerated well, and had no complaints.  Patient armband was removed and shredded.    [facility] at 1020. She is to return on 24 at 1000 for her next appointment.    Spring Dove RN  2024  10:23 AM

## 2024-01-30 NOTE — PLAN OF CARE
Problem: Chronic Conditions and Co-morbidities  Goal: Patient's chronic conditions and co-morbidity symptoms are monitored and maintained or improved  Outcome: Completed     Problem: Pain  Goal: Verbalizes/displays adequate comfort level or baseline comfort level  Outcome: Completed

## 2024-02-01 NOTE — PROGRESS NOTES
Iona Rizzo is a 73 y.o. female new patient today referred by DR Yusuf for L breast cancer, hx of bilateral mastectomies.  Patient has noted a tenderness in her L axilla and below the L incision site.  Patient completed a NM bone scan on 4/18/2023.

## 2024-02-05 ENCOUNTER — OFFICE VISIT (OUTPATIENT)
Age: 74
End: 2024-02-05
Payer: MEDICARE

## 2024-02-05 VITALS
HEART RATE: 79 BPM | OXYGEN SATURATION: 97 % | TEMPERATURE: 98.1 F | DIASTOLIC BLOOD PRESSURE: 72 MMHG | RESPIRATION RATE: 16 BRPM | SYSTOLIC BLOOD PRESSURE: 141 MMHG

## 2024-02-05 DIAGNOSIS — C50.012 MALIGNANT NEOPLASM INVOLVING BOTH NIPPLE AND AREOLA OF LEFT BREAST IN FEMALE, ESTROGEN RECEPTOR POSITIVE (HCC): Primary | ICD-10-CM

## 2024-02-05 DIAGNOSIS — Z17.0 MALIGNANT NEOPLASM INVOLVING BOTH NIPPLE AND AREOLA OF LEFT BREAST IN FEMALE, ESTROGEN RECEPTOR POSITIVE (HCC): Primary | ICD-10-CM

## 2024-02-05 PROCEDURE — G8427 DOCREV CUR MEDS BY ELIG CLIN: HCPCS | Performed by: INTERNAL MEDICINE

## 2024-02-05 PROCEDURE — 1090F PRES/ABSN URINE INCON ASSESS: CPT | Performed by: INTERNAL MEDICINE

## 2024-02-05 PROCEDURE — 3078F DIAST BP <80 MM HG: CPT | Performed by: INTERNAL MEDICINE

## 2024-02-05 PROCEDURE — G8484 FLU IMMUNIZE NO ADMIN: HCPCS | Performed by: INTERNAL MEDICINE

## 2024-02-05 PROCEDURE — G8417 CALC BMI ABV UP PARAM F/U: HCPCS | Performed by: INTERNAL MEDICINE

## 2024-02-05 PROCEDURE — 1036F TOBACCO NON-USER: CPT | Performed by: INTERNAL MEDICINE

## 2024-02-05 PROCEDURE — 1123F ACP DISCUSS/DSCN MKR DOCD: CPT | Performed by: INTERNAL MEDICINE

## 2024-02-05 PROCEDURE — 3077F SYST BP >= 140 MM HG: CPT | Performed by: INTERNAL MEDICINE

## 2024-02-05 PROCEDURE — 99214 OFFICE O/P EST MOD 30 MIN: CPT | Performed by: INTERNAL MEDICINE

## 2024-02-05 PROCEDURE — G8399 PT W/DXA RESULTS DOCUMENT: HCPCS | Performed by: INTERNAL MEDICINE

## 2024-02-05 PROCEDURE — 3017F COLORECTAL CA SCREEN DOC REV: CPT | Performed by: INTERNAL MEDICINE

## 2024-02-05 ASSESSMENT — PATIENT HEALTH QUESTIONNAIRE - PHQ9
1. LITTLE INTEREST OR PLEASURE IN DOING THINGS: 0
SUM OF ALL RESPONSES TO PHQ QUESTIONS 1-9: 0
SUM OF ALL RESPONSES TO PHQ9 QUESTIONS 1 & 2: 0
2. FEELING DOWN, DEPRESSED OR HOPELESS: 0
SUM OF ALL RESPONSES TO PHQ QUESTIONS 1-9: 0

## 2024-02-05 NOTE — PROGRESS NOTES
[facility] [place]  Medical Oncology at [facility]  [phone]    Hematology / Oncology Consult    Reason for Visit:   Iona Rizzo is a 73 y.o. female who is seen in consultation at the request of Dr. Yusuf for breast cancer.    History of Present Illness:   Dear colleague,       Just a brief communication regarding our mutual patient Iona Rizzo.  As you recall this kind 73-year-old female carries a remote history of invasive ductal carcinoma of the left breast.  The patient was staged at T2 N0 M0 or stage IIa at the time.  Her diagnosis dates back to 2011.  The patient's biology was consistent with luminal a.  She was ER/ID positive and HER2 not expressing.  The patient's Oncotype score was 32.  She would undergo a bilateral mastectomy.  5 sentinel lymph nodes were negative for malignancy.  She would have postoperative radiation therapy for a primary of 5 cm in size.  The patient would have cytotoxic's with Taxotere and Cytoxan x 4 in the adjuvant setting she would have multiple approaches to hormonal manipulation over the years.  Her last hormonal manipulation was Faslodex which she discontinued in 2021.  The patient does have a history of osteoporosis and remains on Prolia at the present time.  Recently she had fleeting left-sided chest discomfort which lasted seconds to minutes this was happening perhaps once a month.  It was felt that she should see the oncology team for discussions regarding diagnostics given her symptoms.  The patient's appetite is excellent her weight is well-preserved.  She has had impacted cerumen removed most recently in the urgent care setting.  She denies any other changes anatomically to her chest wall.  She reports no inflammatory change or soft tissue mass.  She did have some dysesthesia in the left sided surgical cicatrix again which was fleeting.  She denies any bone pain or back pain no soft tissue masses or adenopathy.  There are no complaints of cough

## 2024-04-08 PROBLEM — I34.0 NONRHEUMATIC MITRAL VALVE REGURGITATION: Status: ACTIVE | Noted: 2024-04-08

## 2024-04-08 NOTE — PROGRESS NOTES
ASSESSMENT and PLAN  1. Nonrheumatic mitral valve regurgitation  Mild-moderate MR on echo 8/8/2023.  Repeat echo in 1-2 years.    2. Atypical chest pain  No ischemia on Madelyn Cardiolite 5/12/2022.  No recurrence.    3. Essential hypertension  Modestly elevated blood pressure today.  Previous readings have been lower.  I did not recommend any medication changes.       Follow-up in 1 year.  The patient has been instructed and agrees to call our office with any issues or other concerns related to their cardiac condition(s) and/or complaint(s).      CHIEF COMPLAINT  Routine follow-up    HPI:    Iona Rizzo is a 73 y.o. female here for follow-up.  She was last seen in the office 8/10/2023 by Mariella Smart NP.  She was stable at that visit and free of cardiac symptoms.  No acute cardiac issues have developed in the interim.  She denies chest pain, dyspnea, heart failure symptoms, palpitations, syncope or near syncope.    PERTINENT CARDIAC HISTORY: (Records reviewed and summarized below)  Chest pain  ==> Echo 8/28/2020, EF 60%, AV sclerosis, RV nl  ==> Madelyn Cardiolite 5/12/2022, EF 52%, no ischemia or infarction  ==> Echo 8/8/2023, EF 65%, 1-2+ MR, RVF reduced, PASP 35    Vasovagal syncope  Hypertension  Hyperlipidemia  Colon cancer s/p hemicolectomy  Breast cancer s/p bilateral mastectomies/XRT/chemo    Past medical history, past surgical history, family history, social history, and medications were all reviewed with the patient today and updated as necessary.     Current Outpatient Medications   Medication Sig    vitamin D3 (CHOLECALCIFEROL) 10 MCG (400 UNIT) TABS tablet Take 2 tablets by mouth daily    Multiple Vitamins-Minerals (THERAPEUTIC MULTIVITAMIN-MINERALS) tablet Take 1 tablet by mouth daily    amLODIPine (NORVASC) 10 MG tablet Take 1 tablet by mouth daily    aspirin 81 MG EC tablet Take 1 tablet by mouth daily    atorvastatin (LIPITOR) 40 MG tablet Take 1 tablet by mouth daily    denosumab (PROLIA) 60

## 2024-04-16 ENCOUNTER — OFFICE VISIT (OUTPATIENT)
Age: 74
End: 2024-04-16
Payer: MEDICARE

## 2024-04-16 VITALS
OXYGEN SATURATION: 99 % | DIASTOLIC BLOOD PRESSURE: 82 MMHG | WEIGHT: 169.6 LBS | BODY MASS INDEX: 33.3 KG/M2 | SYSTOLIC BLOOD PRESSURE: 154 MMHG | RESPIRATION RATE: 22 BRPM | HEART RATE: 73 BPM | HEIGHT: 60 IN

## 2024-04-16 DIAGNOSIS — I34.0 NONRHEUMATIC MITRAL VALVE REGURGITATION: Primary | ICD-10-CM

## 2024-04-16 DIAGNOSIS — R07.89 ATYPICAL CHEST PAIN: ICD-10-CM

## 2024-04-16 DIAGNOSIS — I10 ESSENTIAL HYPERTENSION: ICD-10-CM

## 2024-04-16 PROCEDURE — 1090F PRES/ABSN URINE INCON ASSESS: CPT | Performed by: INTERNAL MEDICINE

## 2024-04-16 PROCEDURE — G8399 PT W/DXA RESULTS DOCUMENT: HCPCS | Performed by: INTERNAL MEDICINE

## 2024-04-16 PROCEDURE — G8427 DOCREV CUR MEDS BY ELIG CLIN: HCPCS | Performed by: INTERNAL MEDICINE

## 2024-04-16 PROCEDURE — 99213 OFFICE O/P EST LOW 20 MIN: CPT | Performed by: INTERNAL MEDICINE

## 2024-04-16 PROCEDURE — 1036F TOBACCO NON-USER: CPT | Performed by: INTERNAL MEDICINE

## 2024-04-16 PROCEDURE — 3077F SYST BP >= 140 MM HG: CPT | Performed by: INTERNAL MEDICINE

## 2024-04-16 PROCEDURE — 1123F ACP DISCUSS/DSCN MKR DOCD: CPT | Performed by: INTERNAL MEDICINE

## 2024-04-16 PROCEDURE — G8417 CALC BMI ABV UP PARAM F/U: HCPCS | Performed by: INTERNAL MEDICINE

## 2024-04-16 PROCEDURE — 3017F COLORECTAL CA SCREEN DOC REV: CPT | Performed by: INTERNAL MEDICINE

## 2024-04-16 PROCEDURE — 3079F DIAST BP 80-89 MM HG: CPT | Performed by: INTERNAL MEDICINE

## 2024-04-16 ASSESSMENT — PATIENT HEALTH QUESTIONNAIRE - PHQ9
2. FEELING DOWN, DEPRESSED OR HOPELESS: NOT AT ALL
SUM OF ALL RESPONSES TO PHQ QUESTIONS 1-9: 0
SUM OF ALL RESPONSES TO PHQ QUESTIONS 1-9: 0
1. LITTLE INTEREST OR PLEASURE IN DOING THINGS: NOT AT ALL
SUM OF ALL RESPONSES TO PHQ QUESTIONS 1-9: 0
SUM OF ALL RESPONSES TO PHQ9 QUESTIONS 1 & 2: 0
SUM OF ALL RESPONSES TO PHQ QUESTIONS 1-9: 0

## 2024-04-16 NOTE — PROGRESS NOTES
Chief Complaint   Patient presents with    Valvular Heart Disease    Hypertension     BP (!) 154/82 (Site: Right Upper Arm, Position: Sitting, Cuff Size: Medium Adult)   Pulse 73   Resp 22   Ht 1.524 m (5')   Wt 76.9 kg (169 lb 9.6 oz)   SpO2 99%   BMI 33.12 kg/m²

## 2024-07-25 PROBLEM — E55.9 VITAMIN D DEFICIENCY: Status: ACTIVE | Noted: 2024-07-25

## 2024-07-30 ENCOUNTER — HOSPITAL ENCOUNTER (OUTPATIENT)
Facility: HOSPITAL | Age: 74
Setting detail: INFUSION SERIES
Discharge: HOME | End: 2024-07-30
Payer: MEDICARE

## 2024-07-30 VITALS
HEART RATE: 69 BPM | TEMPERATURE: 97.7 F | DIASTOLIC BLOOD PRESSURE: 66 MMHG | RESPIRATION RATE: 16 BRPM | OXYGEN SATURATION: 96 % | SYSTOLIC BLOOD PRESSURE: 144 MMHG | WEIGHT: 169 LBS | BODY MASS INDEX: 33.01 KG/M2

## 2024-07-30 DIAGNOSIS — C50.919 MALIGNANT NEOPLASM OF FEMALE BREAST, UNSPECIFIED ESTROGEN RECEPTOR STATUS, UNSPECIFIED LATERALITY, UNSPECIFIED SITE OF BREAST (HCC): ICD-10-CM

## 2024-07-30 DIAGNOSIS — E55.9 VITAMIN D DEFICIENCY: Primary | ICD-10-CM

## 2024-07-30 LAB
ALBUMIN SERPL-MCNC: 4.3 G/DL (ref 3.5–5)
ALBUMIN/GLOB SERPL: 1.1 (ref 1.1–2.2)
ALP SERPL-CCNC: 86 U/L (ref 45–117)
ALT SERPL-CCNC: 29 U/L (ref 12–78)
ANION GAP SERPL CALC-SCNC: 11 MMOL/L (ref 5–15)
AST SERPL-CCNC: 23 U/L (ref 15–37)
BILIRUB SERPL-MCNC: 0.4 MG/DL (ref 0.2–1)
BUN SERPL-MCNC: 12 MG/DL (ref 6–20)
BUN/CREAT SERPL: 13 (ref 12–20)
CALCIUM SERPL-MCNC: 9.3 MG/DL (ref 8.5–10.1)
CHLORIDE SERPL-SCNC: 103 MMOL/L (ref 97–108)
CO2 SERPL-SCNC: 28 MMOL/L (ref 21–32)
CREAT SERPL-MCNC: 0.94 MG/DL (ref 0.55–1.02)
GLOBULIN SER CALC-MCNC: 3.8 G/DL (ref 2–4)
GLUCOSE SERPL-MCNC: 120 MG/DL (ref 65–100)
PHOSPHATE SERPL-MCNC: 3.7 MG/DL (ref 2.6–4.7)
POTASSIUM SERPL-SCNC: 3.8 MMOL/L (ref 3.5–5.1)
PROT SERPL-MCNC: 8.1 G/DL (ref 6.4–8.2)
SODIUM SERPL-SCNC: 142 MMOL/L (ref 136–145)

## 2024-07-30 PROCEDURE — 84100 ASSAY OF PHOSPHORUS: CPT

## 2024-07-30 PROCEDURE — 96372 THER/PROPH/DIAG INJ SC/IM: CPT

## 2024-07-30 PROCEDURE — 6360000002 HC RX W HCPCS: Performed by: INTERNAL MEDICINE

## 2024-07-30 PROCEDURE — 80053 COMPREHEN METABOLIC PANEL: CPT

## 2024-07-30 PROCEDURE — 36415 COLL VENOUS BLD VENIPUNCTURE: CPT

## 2024-07-30 RX ORDER — ALBUTEROL SULFATE 90 UG/1
4 AEROSOL, METERED RESPIRATORY (INHALATION) PRN
OUTPATIENT
Start: 2025-01-28

## 2024-07-30 RX ORDER — ACETAMINOPHEN 325 MG/1
650 TABLET ORAL
Status: DISCONTINUED | OUTPATIENT
Start: 2024-07-30 | End: 2024-07-31 | Stop reason: HOSPADM

## 2024-07-30 RX ORDER — SODIUM CHLORIDE 9 MG/ML
INJECTION, SOLUTION INTRAVENOUS CONTINUOUS
Status: DISCONTINUED | OUTPATIENT
Start: 2024-07-30 | End: 2024-07-31 | Stop reason: HOSPADM

## 2024-07-30 RX ORDER — DIPHENHYDRAMINE HYDROCHLORIDE 50 MG/ML
50 INJECTION INTRAMUSCULAR; INTRAVENOUS
OUTPATIENT
Start: 2025-01-28

## 2024-07-30 RX ORDER — EPINEPHRINE 1 MG/ML
0.3 INJECTION, SOLUTION, CONCENTRATE INTRAVENOUS PRN
OUTPATIENT
Start: 2025-01-28

## 2024-07-30 RX ORDER — ALBUTEROL SULFATE 90 UG/1
4 AEROSOL, METERED RESPIRATORY (INHALATION) PRN
Status: DISCONTINUED | OUTPATIENT
Start: 2024-07-30 | End: 2024-07-31 | Stop reason: HOSPADM

## 2024-07-30 RX ORDER — ONDANSETRON 2 MG/ML
8 INJECTION INTRAMUSCULAR; INTRAVENOUS
OUTPATIENT
Start: 2025-01-28

## 2024-07-30 RX ORDER — SODIUM CHLORIDE 9 MG/ML
INJECTION, SOLUTION INTRAVENOUS CONTINUOUS
OUTPATIENT
Start: 2025-01-28

## 2024-07-30 RX ORDER — EPINEPHRINE 1 MG/ML
0.3 INJECTION, SOLUTION, CONCENTRATE INTRAVENOUS PRN
Status: DISCONTINUED | OUTPATIENT
Start: 2024-07-30 | End: 2024-07-31 | Stop reason: HOSPADM

## 2024-07-30 RX ORDER — DIPHENHYDRAMINE HYDROCHLORIDE 50 MG/ML
50 INJECTION INTRAMUSCULAR; INTRAVENOUS
Status: DISCONTINUED | OUTPATIENT
Start: 2024-07-30 | End: 2024-07-31 | Stop reason: HOSPADM

## 2024-07-30 RX ORDER — ONDANSETRON 2 MG/ML
8 INJECTION INTRAMUSCULAR; INTRAVENOUS
Status: DISCONTINUED | OUTPATIENT
Start: 2024-07-30 | End: 2024-07-31 | Stop reason: HOSPADM

## 2024-07-30 RX ORDER — ACETAMINOPHEN 325 MG/1
650 TABLET ORAL
OUTPATIENT
Start: 2025-01-28

## 2024-07-30 RX ADMIN — DENOSUMAB 60 MG: 60 INJECTION SUBCUTANEOUS at 11:05

## 2024-07-30 ASSESSMENT — PAIN SCALES - GENERAL: PAINLEVEL_OUTOF10: 0

## 2025-01-29 ENCOUNTER — HOSPITAL ENCOUNTER (OUTPATIENT)
Facility: HOSPITAL | Age: 75
End: 2025-01-29

## 2025-02-04 ENCOUNTER — HOSPITAL ENCOUNTER (OUTPATIENT)
Facility: HOSPITAL | Age: 75
Setting detail: INFUSION SERIES
Discharge: HOME | End: 2025-02-04
Payer: MEDICARE

## 2025-02-04 VITALS
DIASTOLIC BLOOD PRESSURE: 75 MMHG | SYSTOLIC BLOOD PRESSURE: 148 MMHG | HEIGHT: 60 IN | OXYGEN SATURATION: 97 % | TEMPERATURE: 98.3 F | RESPIRATION RATE: 16 BRPM | HEART RATE: 75 BPM | WEIGHT: 170.6 LBS | BODY MASS INDEX: 33.49 KG/M2

## 2025-02-04 DIAGNOSIS — E55.9 VITAMIN D DEFICIENCY: ICD-10-CM

## 2025-02-04 DIAGNOSIS — C50.919 MALIGNANT NEOPLASM OF FEMALE BREAST, UNSPECIFIED ESTROGEN RECEPTOR STATUS, UNSPECIFIED LATERALITY, UNSPECIFIED SITE OF BREAST (HCC): Primary | ICD-10-CM

## 2025-02-04 LAB
ALBUMIN SERPL-MCNC: 4.5 G/DL (ref 3.5–5)
ALBUMIN/GLOB SERPL: 1.1 (ref 1.1–2.2)
ALP SERPL-CCNC: 91 U/L (ref 45–117)
ALT SERPL-CCNC: 27 U/L (ref 12–78)
ANION GAP SERPL CALC-SCNC: 7 MMOL/L (ref 2–12)
AST SERPL-CCNC: 15 U/L (ref 15–37)
BILIRUB SERPL-MCNC: 0.5 MG/DL (ref 0.2–1)
BUN SERPL-MCNC: 13 MG/DL (ref 6–20)
BUN/CREAT SERPL: 14 (ref 12–20)
CALCIUM SERPL-MCNC: 9.7 MG/DL (ref 8.5–10.1)
CHLORIDE SERPL-SCNC: 103 MMOL/L (ref 97–108)
CO2 SERPL-SCNC: 29 MMOL/L (ref 21–32)
CREAT SERPL-MCNC: 0.91 MG/DL (ref 0.55–1.02)
GLOBULIN SER CALC-MCNC: 4 G/DL (ref 2–4)
GLUCOSE SERPL-MCNC: 129 MG/DL (ref 65–100)
PHOSPHATE SERPL-MCNC: 4 MG/DL (ref 2.6–4.7)
POTASSIUM SERPL-SCNC: 3.4 MMOL/L (ref 3.5–5.1)
PROT SERPL-MCNC: 8.5 G/DL (ref 6.4–8.2)
SODIUM SERPL-SCNC: 139 MMOL/L (ref 136–145)

## 2025-02-04 PROCEDURE — 36415 COLL VENOUS BLD VENIPUNCTURE: CPT

## 2025-02-04 PROCEDURE — 84100 ASSAY OF PHOSPHORUS: CPT

## 2025-02-04 PROCEDURE — 6360000002 HC RX W HCPCS: Performed by: INTERNAL MEDICINE

## 2025-02-04 PROCEDURE — 96372 THER/PROPH/DIAG INJ SC/IM: CPT

## 2025-02-04 PROCEDURE — 80053 COMPREHEN METABOLIC PANEL: CPT

## 2025-02-04 RX ORDER — HYDROCORTISONE SODIUM SUCCINATE 100 MG/2ML
100 INJECTION INTRAMUSCULAR; INTRAVENOUS
Status: DISCONTINUED | OUTPATIENT
Start: 2025-02-04 | End: 2025-02-05 | Stop reason: HOSPADM

## 2025-02-04 RX ORDER — DIPHENHYDRAMINE HYDROCHLORIDE 50 MG/ML
50 INJECTION INTRAMUSCULAR; INTRAVENOUS
OUTPATIENT
Start: 2025-07-27

## 2025-02-04 RX ORDER — ALBUTEROL SULFATE 90 UG/1
4 INHALANT RESPIRATORY (INHALATION) PRN
OUTPATIENT
Start: 2025-07-27

## 2025-02-04 RX ORDER — ALBUTEROL SULFATE 90 UG/1
4 INHALANT RESPIRATORY (INHALATION) PRN
Status: DISCONTINUED | OUTPATIENT
Start: 2025-02-04 | End: 2025-02-05 | Stop reason: HOSPADM

## 2025-02-04 RX ORDER — SODIUM CHLORIDE 9 MG/ML
INJECTION, SOLUTION INTRAVENOUS CONTINUOUS
OUTPATIENT
Start: 2025-07-27

## 2025-02-04 RX ORDER — ACETAMINOPHEN 325 MG/1
650 TABLET ORAL
Status: DISCONTINUED | OUTPATIENT
Start: 2025-02-04 | End: 2025-02-05 | Stop reason: HOSPADM

## 2025-02-04 RX ORDER — ONDANSETRON 2 MG/ML
8 INJECTION INTRAMUSCULAR; INTRAVENOUS
OUTPATIENT
Start: 2025-07-27

## 2025-02-04 RX ORDER — HYDROCORTISONE SODIUM SUCCINATE 100 MG/2ML
100 INJECTION INTRAMUSCULAR; INTRAVENOUS
OUTPATIENT
Start: 2025-07-27

## 2025-02-04 RX ORDER — ONDANSETRON 2 MG/ML
8 INJECTION INTRAMUSCULAR; INTRAVENOUS
Status: DISCONTINUED | OUTPATIENT
Start: 2025-02-04 | End: 2025-02-05 | Stop reason: HOSPADM

## 2025-02-04 RX ORDER — EPINEPHRINE 1 MG/ML
0.3 INJECTION, SOLUTION, CONCENTRATE INTRAVENOUS PRN
Status: DISCONTINUED | OUTPATIENT
Start: 2025-02-04 | End: 2025-02-05 | Stop reason: HOSPADM

## 2025-02-04 RX ORDER — SODIUM CHLORIDE 9 MG/ML
INJECTION, SOLUTION INTRAVENOUS CONTINUOUS
Status: DISCONTINUED | OUTPATIENT
Start: 2025-02-04 | End: 2025-02-05 | Stop reason: HOSPADM

## 2025-02-04 RX ORDER — EPINEPHRINE 1 MG/ML
0.3 INJECTION, SOLUTION, CONCENTRATE INTRAVENOUS PRN
OUTPATIENT
Start: 2025-07-27

## 2025-02-04 RX ORDER — DIPHENHYDRAMINE HYDROCHLORIDE 50 MG/ML
50 INJECTION INTRAMUSCULAR; INTRAVENOUS
Status: DISCONTINUED | OUTPATIENT
Start: 2025-02-04 | End: 2025-02-05 | Stop reason: HOSPADM

## 2025-02-04 RX ORDER — ACETAMINOPHEN 325 MG/1
650 TABLET ORAL
OUTPATIENT
Start: 2025-07-27

## 2025-02-04 RX ADMIN — DENOSUMAB 60 MG: 60 INJECTION SUBCUTANEOUS at 10:33

## 2025-02-04 ASSESSMENT — PAIN SCALES - GENERAL: PAINLEVEL_OUTOF10: 0

## 2025-02-04 NOTE — PROGRESS NOTES
[facility] Progress Note    Date: 2025    Name: Iona Rizzo    MRN: [id number]         : 1950      Ms. Rizzo was assessed and education was provided.     Ms. Rizzo's vitals were reviewed and patient was observed for 5 minutes prior to treatment.   Vitals:    25 0950   BP: (!) 148/75   Pulse: 75   Resp: 16   Temp: 98.3 °F (36.8 °C)   SpO2: 97%       Lab results were obtained and reviewed.  Recent Results (from the past 12 hour(s))   Phosphorus    Collection Time: 25  9:56 AM   Result Value Ref Range    Phosphorus 4.0 2.6 - 4.7 MG/DL   Comprehensive metabolic panel    Collection Time: 25  9:56 AM   Result Value Ref Range    Sodium 139 136 - 145 mmol/L    Potassium 3.4 (L) 3.5 - 5.1 mmol/L    Chloride 103 97 - 108 mmol/L    CO2 29 21 - 32 mmol/L    Anion Gap 7 2 - 12 mmol/L    Glucose 129 (H) 65 - 100 mg/dL    BUN 13 6 - 20 MG/DL    Creatinine 0.91 0.55 - 1.02 MG/DL    BUN/Creatinine Ratio 14 12 - 20      Est, Glom Filt Rate 65 >60 ml/min/1.73m2    Calcium 9.7 8.5 - 10.1 MG/DL    Total Bilirubin 0.5 0.2 - 1.0 MG/DL    ALT 27 12 - 78 U/L    AST 15 15 - 37 U/L    Alk Phosphatase 91 45 - 117 U/L    Total Protein 8.5 (H) 6.4 - 8.2 g/dL    Albumin 4.5 3.5 - 5.0 g/dL    Globulin 4.0 2.0 - 4.0 g/dL    Albumin/Globulin Ratio 1.1 1.1 - 2.2         Prolia 60mg was administered subcutaneous in  LUE.     Ms. Rizzo tolerated well, and had no complaints.  Patient armband was removed and shredded.    [facility]. Riz[facility] in [facility] condition at 1035. She is to return on 25 at 1400 for her next appointment.    Sprnig Dove RN  2025  10:39 AM

## 2025-03-13 ENCOUNTER — TRANSCRIBE ORDERS (OUTPATIENT)
Facility: HOSPITAL | Age: 75
End: 2025-03-13

## 2025-03-13 DIAGNOSIS — Z78.0 ASYMPTOMATIC MENOPAUSAL STATE: Primary | ICD-10-CM

## 2025-03-13 DIAGNOSIS — Z12.31 SCREENING MAMMOGRAM FOR BREAST CANCER: Primary | ICD-10-CM

## 2025-08-04 ENCOUNTER — APPOINTMENT (OUTPATIENT)
Facility: HOSPITAL | Age: 75
End: 2025-08-04
Payer: MEDICARE

## 2025-08-19 ENCOUNTER — HOSPITAL ENCOUNTER (OUTPATIENT)
Facility: HOSPITAL | Age: 75
Setting detail: INFUSION SERIES
Discharge: HOME | End: 2025-08-19
Payer: MEDICARE

## 2025-08-19 VITALS
TEMPERATURE: 97.7 F | RESPIRATION RATE: 18 BRPM | OXYGEN SATURATION: 99 % | DIASTOLIC BLOOD PRESSURE: 63 MMHG | SYSTOLIC BLOOD PRESSURE: 148 MMHG | HEART RATE: 62 BPM | BODY MASS INDEX: 32.89 KG/M2 | WEIGHT: 168.4 LBS

## 2025-08-19 DIAGNOSIS — E55.9 VITAMIN D DEFICIENCY: Primary | ICD-10-CM

## 2025-08-19 DIAGNOSIS — M85.80 OSTEOPENIA DUE TO CANCER THERAPY: ICD-10-CM

## 2025-08-19 PROCEDURE — 6360000002 HC RX W HCPCS: Performed by: INTERNAL MEDICINE

## 2025-08-19 PROCEDURE — 96372 THER/PROPH/DIAG INJ SC/IM: CPT

## 2025-08-19 RX ORDER — ALBUTEROL SULFATE 90 UG/1
4 INHALANT RESPIRATORY (INHALATION) PRN
Status: DISCONTINUED | OUTPATIENT
Start: 2025-08-19 | End: 2025-08-20 | Stop reason: HOSPADM

## 2025-08-19 RX ORDER — ACETAMINOPHEN 325 MG/1
650 TABLET ORAL
OUTPATIENT
Start: 2026-02-17

## 2025-08-19 RX ORDER — DIPHENHYDRAMINE HYDROCHLORIDE 50 MG/ML
50 INJECTION, SOLUTION INTRAMUSCULAR; INTRAVENOUS
Status: DISCONTINUED | OUTPATIENT
Start: 2025-08-19 | End: 2025-08-20 | Stop reason: HOSPADM

## 2025-08-19 RX ORDER — HYDROCORTISONE SODIUM SUCCINATE 100 MG/2ML
100 INJECTION INTRAMUSCULAR; INTRAVENOUS
Status: DISCONTINUED | OUTPATIENT
Start: 2025-08-19 | End: 2025-08-20 | Stop reason: HOSPADM

## 2025-08-19 RX ORDER — ALBUTEROL SULFATE 90 UG/1
4 INHALANT RESPIRATORY (INHALATION) PRN
OUTPATIENT
Start: 2026-02-17

## 2025-08-19 RX ORDER — DIPHENHYDRAMINE HYDROCHLORIDE 50 MG/ML
50 INJECTION, SOLUTION INTRAMUSCULAR; INTRAVENOUS
OUTPATIENT
Start: 2026-02-17

## 2025-08-19 RX ORDER — ACETAMINOPHEN 325 MG/1
650 TABLET ORAL
Status: DISCONTINUED | OUTPATIENT
Start: 2025-08-19 | End: 2025-08-20 | Stop reason: HOSPADM

## 2025-08-19 RX ORDER — SODIUM CHLORIDE 9 MG/ML
INJECTION, SOLUTION INTRAVENOUS PRN
Status: DISCONTINUED | OUTPATIENT
Start: 2025-08-19 | End: 2025-08-20 | Stop reason: HOSPADM

## 2025-08-19 RX ORDER — ONDANSETRON 2 MG/ML
8 INJECTION INTRAMUSCULAR; INTRAVENOUS
Status: DISCONTINUED | OUTPATIENT
Start: 2025-08-19 | End: 2025-08-20 | Stop reason: HOSPADM

## 2025-08-19 RX ORDER — EPINEPHRINE 1 MG/ML
0.3 INJECTION, SOLUTION, CONCENTRATE INTRAVENOUS PRN
OUTPATIENT
Start: 2026-02-17

## 2025-08-19 RX ORDER — ONDANSETRON 2 MG/ML
8 INJECTION INTRAMUSCULAR; INTRAVENOUS
OUTPATIENT
Start: 2026-02-17

## 2025-08-19 RX ORDER — EPINEPHRINE 1 MG/ML
0.3 INJECTION, SOLUTION, CONCENTRATE INTRAVENOUS PRN
Status: DISCONTINUED | OUTPATIENT
Start: 2025-08-19 | End: 2025-08-20 | Stop reason: HOSPADM

## 2025-08-19 RX ORDER — SODIUM CHLORIDE 9 MG/ML
INJECTION, SOLUTION INTRAVENOUS PRN
OUTPATIENT
Start: 2026-02-17

## 2025-08-19 RX ORDER — HYDROCORTISONE SODIUM SUCCINATE 100 MG/2ML
100 INJECTION INTRAMUSCULAR; INTRAVENOUS
OUTPATIENT
Start: 2026-02-17

## 2025-08-19 RX ADMIN — DENOSUMAB 60 MG: 60 INJECTION SUBCUTANEOUS at 09:58

## 2025-08-19 ASSESSMENT — PAIN SCALES - GENERAL: PAINLEVEL_OUTOF10: 0

## 2025-08-27 ENCOUNTER — HOSPITAL ENCOUNTER (OUTPATIENT)
Facility: HOSPITAL | Age: 75
Discharge: HOME | End: 2025-08-30
Payer: MEDICARE

## 2025-08-27 DIAGNOSIS — Z78.0 ASYMPTOMATIC MENOPAUSAL STATE: ICD-10-CM

## 2025-08-27 PROCEDURE — 77080 DXA BONE DENSITY AXIAL: CPT

## (undated) DEVICE — BLUNT CANNULA: Brand: MONOJECT

## (undated) DEVICE — GOWN,ISO,KNITCUFF, PREMIUM BLUE, LV3,XL: Brand: MEDLINE INDUSTRIES, INC.

## (undated) DEVICE — SOLUTION IRRIG 1000ML STRL H2O USP PLAS POUR BTL

## (undated) DEVICE — TRAP POLYP 1 CHMBR WIDE EYE

## (undated) DEVICE — ENDO CARRY-ON PROCEDURE KIT INCLUDES ENZYMATIC SPONGE, GAUZE, BIOHAZARD LABEL, TRAY, LUBRICANT, DIRTY SCOPE LABEL, WATER LABEL, TRAY, DRAWSTRING PAD, AND DEFENDO 4-PIECE KIT.: Brand: ENDO CARRY-ON PROCEDURE KIT

## (undated) DEVICE — SYRINGE 20ML LL S/C 50

## (undated) DEVICE — KIT ENDO OP4 CA 1.1+ BX20

## (undated) DEVICE — SNARE ENDOSCP L240CM SHTH DIA2.4MM LOOP W20MM MIN WRK CHN

## (undated) DEVICE — FORCEPS BX L240CM JAW DIA2.2MM RAD JAW 4 HOT DISP

## (undated) DEVICE — LINER,SEMI-RIGID,3000CC,50EA/CS: Brand: MEDLINE

## (undated) DEVICE — REM POLYHESIVE ADULT PATIENT RETURN ELECTRODE: Brand: VALLEYLAB